# Patient Record
Sex: MALE | Race: BLACK OR AFRICAN AMERICAN | NOT HISPANIC OR LATINO
[De-identification: names, ages, dates, MRNs, and addresses within clinical notes are randomized per-mention and may not be internally consistent; named-entity substitution may affect disease eponyms.]

---

## 2017-04-10 ENCOUNTER — APPOINTMENT (OUTPATIENT)
Dept: INFECTIOUS DISEASE | Facility: CLINIC | Age: 72
End: 2017-04-10

## 2018-02-15 ENCOUNTER — APPOINTMENT (OUTPATIENT)
Dept: FAMILY MEDICINE | Facility: CLINIC | Age: 73
End: 2018-02-15
Payer: SELF-PAY

## 2018-02-15 VITALS
HEIGHT: 71 IN | OXYGEN SATURATION: 96 % | WEIGHT: 244 LBS | SYSTOLIC BLOOD PRESSURE: 138 MMHG | BODY MASS INDEX: 34.16 KG/M2 | DIASTOLIC BLOOD PRESSURE: 60 MMHG | HEART RATE: 51 BPM | TEMPERATURE: 98.2 F

## 2018-02-15 DIAGNOSIS — I25.10 ATHEROSCLEROTIC HEART DISEASE OF NATIVE CORONARY ARTERY W/OUT ANGINA PECTORIS: ICD-10-CM

## 2018-02-15 DIAGNOSIS — I10 ESSENTIAL (PRIMARY) HYPERTENSION: ICD-10-CM

## 2018-02-15 DIAGNOSIS — E11.40 TYPE 2 DIABETES MELLITUS WITH DIABETIC NEUROPATHY, UNSPECIFIED: ICD-10-CM

## 2018-02-15 DIAGNOSIS — E66.9 OBESITY, UNSPECIFIED: ICD-10-CM

## 2018-02-15 DIAGNOSIS — N18.9 CHRONIC KIDNEY DISEASE, UNSPECIFIED: ICD-10-CM

## 2018-02-15 DIAGNOSIS — I73.9 PERIPHERAL VASCULAR DISEASE, UNSPECIFIED: ICD-10-CM

## 2018-02-15 DIAGNOSIS — E11.9 TYPE 2 DIABETES MELLITUS W/OUT COMPLICATIONS: ICD-10-CM

## 2018-02-15 PROCEDURE — 99214 OFFICE O/P EST MOD 30 MIN: CPT

## 2018-02-16 PROBLEM — I73.9 PAD (PERIPHERAL ARTERY DISEASE): Status: ACTIVE | Noted: 2018-02-15

## 2021-11-09 ENCOUNTER — INPATIENT (INPATIENT)
Facility: HOSPITAL | Age: 76
LOS: 2 days | Discharge: HOME | DRG: 638 | End: 2021-11-12
Attending: STUDENT IN AN ORGANIZED HEALTH CARE EDUCATION/TRAINING PROGRAM | Admitting: INTERNAL MEDICINE
Payer: MEDICARE

## 2021-11-09 VITALS
HEART RATE: 75 BPM | DIASTOLIC BLOOD PRESSURE: 59 MMHG | SYSTOLIC BLOOD PRESSURE: 123 MMHG | TEMPERATURE: 97 F | OXYGEN SATURATION: 97 % | RESPIRATION RATE: 18 BRPM | WEIGHT: 220.02 LBS

## 2021-11-09 DIAGNOSIS — Z90.49 ACQUIRED ABSENCE OF OTHER SPECIFIED PARTS OF DIGESTIVE TRACT: Chronic | ICD-10-CM

## 2021-11-09 DIAGNOSIS — Z95.1 PRESENCE OF AORTOCORONARY BYPASS GRAFT: Chronic | ICD-10-CM

## 2021-11-09 DIAGNOSIS — Z98.890 OTHER SPECIFIED POSTPROCEDURAL STATES: Chronic | ICD-10-CM

## 2021-11-09 DIAGNOSIS — I95.9 HYPOTENSION, UNSPECIFIED: ICD-10-CM

## 2021-11-09 LAB
ALBUMIN SERPL ELPH-MCNC: 3.9 G/DL — SIGNIFICANT CHANGE UP (ref 3.3–5)
ALP SERPL-CCNC: 87 U/L — SIGNIFICANT CHANGE UP (ref 40–120)
ALT FLD-CCNC: 42 U/L — SIGNIFICANT CHANGE UP (ref 10–45)
ANION GAP SERPL CALC-SCNC: 9 MMOL/L — SIGNIFICANT CHANGE UP (ref 5–17)
AST SERPL-CCNC: 33 U/L — SIGNIFICANT CHANGE UP (ref 10–40)
BASOPHILS # BLD AUTO: 0.11 K/UL — SIGNIFICANT CHANGE UP (ref 0–0.2)
BASOPHILS NFR BLD AUTO: 1.1 % — SIGNIFICANT CHANGE UP (ref 0–2)
BILIRUB SERPL-MCNC: 0.4 MG/DL — SIGNIFICANT CHANGE UP (ref 0.2–1.2)
BUN SERPL-MCNC: 42 MG/DL — HIGH (ref 7–23)
CALCIUM SERPL-MCNC: 11.1 MG/DL — HIGH (ref 8.4–10.5)
CHLORIDE SERPL-SCNC: 100 MMOL/L — SIGNIFICANT CHANGE UP (ref 96–108)
CO2 SERPL-SCNC: 27 MMOL/L — SIGNIFICANT CHANGE UP (ref 22–31)
CREAT SERPL-MCNC: 2.86 MG/DL — HIGH (ref 0.5–1.3)
EOSINOPHIL # BLD AUTO: 0.26 K/UL — SIGNIFICANT CHANGE UP (ref 0–0.5)
EOSINOPHIL NFR BLD AUTO: 2.5 % — SIGNIFICANT CHANGE UP (ref 0–6)
GLUCOSE BLDC GLUCOMTR-MCNC: 116 MG/DL — HIGH (ref 70–99)
GLUCOSE BLDC GLUCOMTR-MCNC: 123 MG/DL — HIGH (ref 70–99)
GLUCOSE BLDC GLUCOMTR-MCNC: 57 MG/DL — LOW (ref 70–99)
GLUCOSE BLDC GLUCOMTR-MCNC: 63 MG/DL — LOW (ref 70–99)
GLUCOSE BLDC GLUCOMTR-MCNC: 63 MG/DL — LOW (ref 70–99)
GLUCOSE BLDC GLUCOMTR-MCNC: 82 MG/DL — SIGNIFICANT CHANGE UP (ref 70–99)
GLUCOSE SERPL-MCNC: 47 MG/DL — CRITICAL LOW (ref 70–99)
HCT VFR BLD CALC: 41.6 % — SIGNIFICANT CHANGE UP (ref 39–50)
HGB BLD-MCNC: 13.5 G/DL — SIGNIFICANT CHANGE UP (ref 13–17)
IMM GRANULOCYTES NFR BLD AUTO: 0.4 % — SIGNIFICANT CHANGE UP (ref 0–1.5)
LYMPHOCYTES # BLD AUTO: 2.74 K/UL — SIGNIFICANT CHANGE UP (ref 1–3.3)
LYMPHOCYTES # BLD AUTO: 26.5 % — SIGNIFICANT CHANGE UP (ref 13–44)
MCHC RBC-ENTMCNC: 29.4 PG — SIGNIFICANT CHANGE UP (ref 27–34)
MCHC RBC-ENTMCNC: 32.5 GM/DL — SIGNIFICANT CHANGE UP (ref 32–36)
MCV RBC AUTO: 90.6 FL — SIGNIFICANT CHANGE UP (ref 80–100)
MONOCYTES # BLD AUTO: 1.04 K/UL — HIGH (ref 0–0.9)
MONOCYTES NFR BLD AUTO: 10.1 % — SIGNIFICANT CHANGE UP (ref 2–14)
NEUTROPHILS # BLD AUTO: 6.15 K/UL — SIGNIFICANT CHANGE UP (ref 1.8–7.4)
NEUTROPHILS NFR BLD AUTO: 59.4 % — SIGNIFICANT CHANGE UP (ref 43–77)
NRBC # BLD: 0 /100 WBCS — SIGNIFICANT CHANGE UP (ref 0–0)
NT-PROBNP SERPL-SCNC: 203 PG/ML — SIGNIFICANT CHANGE UP (ref 0–300)
PLATELET # BLD AUTO: 255 K/UL — SIGNIFICANT CHANGE UP (ref 150–400)
POTASSIUM SERPL-MCNC: 5.5 MMOL/L — HIGH (ref 3.5–5.3)
POTASSIUM SERPL-SCNC: 5.5 MMOL/L — HIGH (ref 3.5–5.3)
PROT SERPL-MCNC: 8.5 G/DL — HIGH (ref 6–8.3)
RBC # BLD: 4.59 M/UL — SIGNIFICANT CHANGE UP (ref 4.2–5.8)
RBC # FLD: 14 % — SIGNIFICANT CHANGE UP (ref 10.3–14.5)
SARS-COV-2 RNA SPEC QL NAA+PROBE: SIGNIFICANT CHANGE UP
SODIUM SERPL-SCNC: 136 MMOL/L — SIGNIFICANT CHANGE UP (ref 135–145)
TROPONIN I, HIGH SENSITIVITY RESULT: 15.3 NG/L — SIGNIFICANT CHANGE UP
TROPONIN I, HIGH SENSITIVITY RESULT: 15.9 NG/L — SIGNIFICANT CHANGE UP
WBC # BLD: 10.34 K/UL — SIGNIFICANT CHANGE UP (ref 3.8–10.5)
WBC # FLD AUTO: 10.34 K/UL — SIGNIFICANT CHANGE UP (ref 3.8–10.5)

## 2021-11-09 PROCEDURE — 71045 X-RAY EXAM CHEST 1 VIEW: CPT | Mod: 26

## 2021-11-09 PROCEDURE — 99223 1ST HOSP IP/OBS HIGH 75: CPT

## 2021-11-09 PROCEDURE — 99285 EMERGENCY DEPT VISIT HI MDM: CPT

## 2021-11-09 PROCEDURE — 93010 ELECTROCARDIOGRAM REPORT: CPT

## 2021-11-09 RX ORDER — SODIUM CHLORIDE 9 MG/ML
1000 INJECTION, SOLUTION INTRAVENOUS
Refills: 0 | Status: DISCONTINUED | OUTPATIENT
Start: 2021-11-09 | End: 2021-11-12

## 2021-11-09 RX ORDER — TAMSULOSIN HYDROCHLORIDE 0.4 MG/1
0.4 CAPSULE ORAL AT BEDTIME
Refills: 0 | Status: DISCONTINUED | OUTPATIENT
Start: 2021-11-09 | End: 2021-11-12

## 2021-11-09 RX ORDER — LACTOBACILLUS ACIDOPHILUS 100MM CELL
1 CAPSULE ORAL DAILY
Refills: 0 | Status: DISCONTINUED | OUTPATIENT
Start: 2021-11-09 | End: 2021-11-12

## 2021-11-09 RX ORDER — PANTOPRAZOLE SODIUM 20 MG/1
40 TABLET, DELAYED RELEASE ORAL
Refills: 0 | Status: DISCONTINUED | OUTPATIENT
Start: 2021-11-09 | End: 2021-11-12

## 2021-11-09 RX ORDER — CHOLECALCIFEROL (VITAMIN D3) 125 MCG
400 CAPSULE ORAL DAILY
Refills: 0 | Status: DISCONTINUED | OUTPATIENT
Start: 2021-11-09 | End: 2021-11-12

## 2021-11-09 RX ORDER — TIOTROPIUM BROMIDE 18 UG/1
1 CAPSULE ORAL; RESPIRATORY (INHALATION) DAILY
Refills: 0 | Status: DISCONTINUED | OUTPATIENT
Start: 2021-11-09 | End: 2021-11-12

## 2021-11-09 RX ORDER — HEPARIN SODIUM 5000 [USP'U]/ML
5000 INJECTION INTRAVENOUS; SUBCUTANEOUS EVERY 8 HOURS
Refills: 0 | Status: DISCONTINUED | OUTPATIENT
Start: 2021-11-09 | End: 2021-11-12

## 2021-11-09 RX ORDER — SODIUM CHLORIDE 9 MG/ML
500 INJECTION INTRAMUSCULAR; INTRAVENOUS; SUBCUTANEOUS ONCE
Refills: 0 | Status: COMPLETED | OUTPATIENT
Start: 2021-11-09 | End: 2021-11-09

## 2021-11-09 RX ORDER — DEXTROSE 50 % IN WATER 50 %
12.5 SYRINGE (ML) INTRAVENOUS ONCE
Refills: 0 | Status: DISCONTINUED | OUTPATIENT
Start: 2021-11-09 | End: 2021-11-12

## 2021-11-09 RX ORDER — GABAPENTIN 400 MG/1
300 CAPSULE ORAL THREE TIMES A DAY
Refills: 0 | Status: DISCONTINUED | OUTPATIENT
Start: 2021-11-09 | End: 2021-11-12

## 2021-11-09 RX ORDER — PREGABALIN 225 MG/1
1000 CAPSULE ORAL DAILY
Refills: 0 | Status: DISCONTINUED | OUTPATIENT
Start: 2021-11-09 | End: 2021-11-12

## 2021-11-09 RX ORDER — DEXTROSE 50 % IN WATER 50 %
15 SYRINGE (ML) INTRAVENOUS ONCE
Refills: 0 | Status: DISCONTINUED | OUTPATIENT
Start: 2021-11-09 | End: 2021-11-12

## 2021-11-09 RX ORDER — DEXTROSE 50 % IN WATER 50 %
25 SYRINGE (ML) INTRAVENOUS ONCE
Refills: 0 | Status: DISCONTINUED | OUTPATIENT
Start: 2021-11-09 | End: 2021-11-12

## 2021-11-09 RX ORDER — ASPIRIN/CALCIUM CARB/MAGNESIUM 324 MG
81 TABLET ORAL DAILY
Refills: 0 | Status: DISCONTINUED | OUTPATIENT
Start: 2021-11-09 | End: 2021-11-12

## 2021-11-09 RX ORDER — ACETAMINOPHEN 500 MG
650 TABLET ORAL EVERY 6 HOURS
Refills: 0 | Status: DISCONTINUED | OUTPATIENT
Start: 2021-11-09 | End: 2021-11-12

## 2021-11-09 RX ORDER — INSULIN LISPRO 100/ML
VIAL (ML) SUBCUTANEOUS
Refills: 0 | Status: DISCONTINUED | OUTPATIENT
Start: 2021-11-09 | End: 2021-11-12

## 2021-11-09 RX ORDER — ATORVASTATIN CALCIUM 80 MG/1
80 TABLET, FILM COATED ORAL AT BEDTIME
Refills: 0 | Status: DISCONTINUED | OUTPATIENT
Start: 2021-11-09 | End: 2021-11-12

## 2021-11-09 RX ORDER — INSULIN LISPRO 100/ML
VIAL (ML) SUBCUTANEOUS AT BEDTIME
Refills: 0 | Status: DISCONTINUED | OUTPATIENT
Start: 2021-11-09 | End: 2021-11-12

## 2021-11-09 RX ORDER — GLUCAGON INJECTION, SOLUTION 0.5 MG/.1ML
1 INJECTION, SOLUTION SUBCUTANEOUS ONCE
Refills: 0 | Status: DISCONTINUED | OUTPATIENT
Start: 2021-11-09 | End: 2021-11-12

## 2021-11-09 RX ADMIN — SODIUM CHLORIDE 1000 MILLILITER(S): 9 INJECTION INTRAMUSCULAR; INTRAVENOUS; SUBCUTANEOUS at 18:35

## 2021-11-09 RX ADMIN — SODIUM CHLORIDE 500 MILLILITER(S): 9 INJECTION INTRAMUSCULAR; INTRAVENOUS; SUBCUTANEOUS at 19:13

## 2021-11-09 NOTE — ED ADULT NURSE NOTE - CHIEF COMPLAINT QUOTE
Patient presents to ED from home complaining of lightheadedness & dizziness. Patient states he took his BP at home and it was low all day. Patient's BP is normal in triage. Patient takes amlodipine, lisinopril, & furosemide. Patient states he took the correct doses. Patient states he has lost about 10lbs in the last couple of months. ISAR positive.

## 2021-11-09 NOTE — ED PROVIDER NOTE - NSICDXPASTSURGICALHX_GEN_ALL_CORE_FT
PAST SURGICAL HISTORY:  H/O abdominal surgery for 'stomach cancer'    History of appendectomy     History of cholecystectomy     S/P CABG x 1

## 2021-11-09 NOTE — H&P ADULT - ASSESSMENT
76M hx of HTN, HLD, CAD/1vl CABG, CHF, CKD, BPH, PAD, T2DM on insulin, GERD, bladder cancer pw dizziness with hypotension and hypoglycemia    #Hypotension likely sec to overdiuresis.   hold BP meds and diuretics.  Recheck orthostatics, if still orthostatic - will give additional IVFs.     #DOUGLAS on CKD likely sec to above.  hold diuretics, ACE  check PVRs.   renal consult.     #CAD/CHF  restart meds as BP normalize.   Check ECHO.   trend 2nd trop     #T2DM on insulin with hypoglycemia.   hold additional doses of insulin until hypoglycemia resolves.   Restart insulin in am after hypoglycemia resolves.   encouraged po intake.   cardiology consult for meds optimization.    #HLD   cont statin    #DVT/GI proph.  76M hx of HTN, HLD, CAD/1vl CABG, CHF, CKD, BPH, PAD, T2DM on insulin, GERD, bladder cancer pw dizziness with hypotension and hypoglycemia    #Hypotension likely sec to overdiuresis.   hold BP meds and diuretics.  Recheck orthostatics, if still orthostatic - will give additional IVFs.     #DOUGLAS on CKD likely sec to above.  hold diuretics, ACE  check PVRs.   renal consult.     #CAD/CHF  restart meds as BP normalize.   Check ECHO.   trend 2nd trop     #T2DM on insulin with hypoglycemia.   hold additional doses of insulin until hypoglycemia resolves.   Restart insulin in am after hypoglycemia resolves.   encouraged po intake.   check UA to be complete.  cardiology consult for meds optimization.    #HLD   cont statin    #DVT/GI proph.

## 2021-11-09 NOTE — H&P ADULT - NSHPREVIEWOFSYSTEMS_GEN_ALL_CORE
CONSTITUTIONAL: No fever, weight loss, or fatigue  EYES: No eye pain, visual disturbances, or discharge  ENMT:  No difficulty hearing, tinnitus, vertigo; No sinus or throat pain  NECK: No pain or stiffness  RESPIRATORY: No cough, wheezing, chills or hemoptysis; No shortness of breath  CARDIOVASCULAR: No chest pain, palpitations, ++dizziness, no  leg swelling  GASTROINTESTINAL: No abdominal or epigastric pain. No nausea, vomiting, or hematemesis; No diarrhea or constipation. No melena or hematochezia.  GENITOURINARY: No dysuria, frequency, hematuria, or incontinence  NEUROLOGICAL: No headaches, memory loss, loss of strength, numbness, or tremors  SKIN: No itching, burning, rashes, or lesions   LYMPH NODES: No enlarged glands  ENDOCRINE: No heat or cold intolerance; No hair loss  MUSCULOSKELETAL: No joint pain or swelling; No muscle, back, or extremity pain  PSYCHIATRIC: No depression, anxiety, mood swings, or difficulty sleeping  HEME/LYMPH: No easy bruising, or bleeding gums  ALLERY AND IMMUNOLOGIC: No hives or eczema

## 2021-11-09 NOTE — ED ADULT NURSE NOTE - OBJECTIVE STATEMENT
Patient presents to ED from home complaining of lightheadedness & dizziness. Patient states he took his BP at home and it was low all day. Patient's BP is normal in triage. Patient takes amlodipine, lisinopril, & furosemide. Patient states he took the correct doses. Patient states he has lost about 10lbs in the last couple of months.

## 2021-11-09 NOTE — ED PROVIDER NOTE - CLINICAL SUMMARY MEDICAL DECISION MAKING FREE TEXT BOX
Pt c/o lightheadedness and dizziness and low BP at home. As low as 77/40, the patient comes to the ED reporting that he felt he would fall due to low BP. Patient denies chest pain or SOB. No LE edema. Had recent hospitalization at [facility] for CHF exac. His lasix was doubled for 3 days.   Pt took it upon himself to hold his spironolactone today and he also states his metoprolol was D/C. Pt has extremely long list of meds. In ED, Glucose was 64 and on CMP, was 44. Given full meal. Glucose 82.   Will admit. Polypharmacy. Pt needs optimization of meds. D/W Hospitalist. Pt c/o lightheadedness and dizziness and low BP at home. As low as 77/40, the patient comes to the ED reporting that he felt he would fall due to low BP. Patient denies chest pain or SOB. No LE edema. Had recent hospitalization at outside hospital for CHF exac. His lasix was doubled for 3 days.   Pt took it upon himself to hold his spironolactone today and he also states his metoprolol was D/C. Pt has extremely long list of meds. In ED, Glucose was 64 and on CMP, was 44. Given full meal. Glucose 82.   Will admit. Polypharmacy. Pt needs optimization of meds. D/W Hospitalist.

## 2021-11-09 NOTE — H&P ADULT - HISTORY OF PRESENT ILLNESS
76M hx of HTN, HLD, CAD/1vl CABG, CHF, CKD, BPH, PAD, T2DM on insulin, GERD, bladder cancer pw hypotension and hypoglycemia. Pt related he was just discharged about a week ago from outside hospital for CHF exacerbation. His Lasix was doubled to 40mg bid per instruction till  but he actually only decreased it to qd yesterday. Yesterday, he noted sxs of dizziness while walking his dog outside and nearly fainting but no falls or LOC and checked his blood sugar when he went home and noted it to be in the 50s. Today recurrent sxs while he was outside and on return home noted SBP consistently in the 70s and came to ED. He denied any HA, focal weakness, SOB, CP, palps. In ED, initial FS 57 B. WBC: 10.34 BUN/cr: 42/2.86 K: 5.5 Ca:11.1 trop: 15.3 BNP: 203. s/p 500cc NS bolus in ED. In ED noted orthostasis. s/p juice and meal and now FS: 116. He has noted recently with hypoglycemia in the middle of the night and insulin was reduced from 40 U to 25U in the evening but still with recurrent nocturnal hypoglycemia. Was 235 lbs prior to CHF admission and now down to 220. FS was 110 this am and took 60U of usual 70/30 and had a [employer] egg and cheese croissant  and a doughnut as his only meal for today.  Related Metoprolol was recently D/C but unclear why, ?bradycardia

## 2021-11-09 NOTE — ED PROVIDER NOTE - NSICDXFAMILYHX_GEN_ALL_CORE_FT
FAMILY HISTORY:  Mother  Still living? Unknown  FH: type 2 diabetes, Age at diagnosis: Age Unknown    Sibling  Still living? Unknown  FH: type 2 diabetes, Age at diagnosis: Age Unknown  FHx: kidney failure, Age at diagnosis: Age Unknown

## 2021-11-09 NOTE — H&P ADULT - NSHPPHYSICALEXAM_GEN_ALL_CORE
Vital Signs Last 24 Hrs  T(C): 36.1 (09 Nov 2021 16:56), Max: 36.1 (09 Nov 2021 16:56)  T(F): 97 (09 Nov 2021 16:56), Max: 97 (09 Nov 2021 16:56)  HR: 68 (09 Nov 2021 19:48) (68 - 75)  BP: 109/66 (09 Nov 2021 19:48) (109/66 - 123/59)  BP(mean): 80 (09 Nov 2021 19:48) (80 - 80)  RR: 18 (09 Nov 2021 19:48) (18 - 18)  SpO2: 98% (09 Nov 2021 19:48) (97% - 98%)  Daily     Daily   CAPILLARY BLOOD GLUCOSE      POCT Blood Glucose.: 116 mg/dL (09 Nov 2021 21:59)    I&O's Summary      GENERAL: NAD  HEAD:  Normocephalic  EYES: EOMI, PERRLA, conjunctiva and sclera clear  ENMT: No tonsillar erythema, exudates, or enlargement; Moist mucous membranes, No lesions  NECK: Supple, No JVD, no bruit, normal thyroid  NERVOUS SYSTEM:  Alert & Oriented X3, Good concentration; grossly  Motor Strength 5/5 B/L upper and lower extremities; DTRs 2+ intact and symmetric  CHEST/LUNG: Clear to auscultation bilaterally; No rales, rhonchi, wheezing, or rubs  HEART: Regular rate and rhythm; No murmurs, rubs, or gallops  ABDOMEN: Soft, Nontender, Nondistended; Bowel sounds present  EXTREMITIES:  2+ Peripheral Pulses, No clubbing, cyanosis, or edema  LYMPH: No lymphadenopathy noted  SKIN: No rashes or lesions

## 2021-11-09 NOTE — ED ADULT TRIAGE NOTE - CHIEF COMPLAINT QUOTE
Patient presents to ED from home complaining of lightheadedness & dizziness. Patient states he took his BP at home and it was low all day. Patient's BP is normal in triage. Patient takes amlodipine, lisinopril, & furosemide. Patient states he took the correct doses. Patient states he has lost about 10lbs in the last couple of months. Patient presents to ED from home complaining of lightheadedness & dizziness. Patient states he took his BP at home and it was low all day. Patient's BP is normal in triage. Patient takes amlodipine, lisinopril, & furosemide. Patient states he took the correct doses. Patient states he has lost about 10lbs in the last couple of months. ISAR positive.

## 2021-11-09 NOTE — ED PROVIDER NOTE - OBJECTIVE STATEMENT
76M hx of HTN, HLD, CAD/1vl CABG, CHF, CKD, BPH, PAD, T2DM on insulin, GERD, bladder cancer pw hypotension and hypoglycemia. Pt related he was just discharged about a week ago from outside hospital for CHF exacerbation. His Lasix was doubled to 40mg bid per instruction till 11/1 but he actually only decreased it to qd yesterday. Yesterday, he noted sxs of dizziness while walking his dog outside and nearly fainting but no falls or LOC. He checked his blood sugar when he went home and noted it to be in the 50s. Today recurrent sxs while he was outside and on return home noted SBP consistently in the 70s and came to ED. He denied any HA, focal weakness, SOB, CP, palpitations.

## 2021-11-09 NOTE — ED ADULT NURSE REASSESSMENT NOTE - NS ED NURSE REASSESS COMMENT FT1
Pt. finished dinner. BG rechecked. BG reads 82. Will notify provider. Sugar improved from prior check.

## 2021-11-09 NOTE — ED PROVIDER NOTE - NSICDXPASTMEDICALHX_GEN_ALL_CORE_FT
PAST MEDICAL HISTORY:  CAD (coronary artery disease)     Chronic CHF     CKD (chronic kidney disease)     Diabetes mellitus, type 2     HLD (hyperlipidemia)     HTN (hypertension)

## 2021-11-09 NOTE — H&P ADULT - NSHPLABSRESULTS_GEN_ALL_CORE
13.5   10.34 )-----------( 255      ( 09 Nov 2021 18:35 )             41.6       11-09    136  |  100  |  42<H>  ----------------------------<  47<LL>  5.5<H>   |  27  |  2.86<H>    Ca    11.1<H>      09 Nov 2021 18:35    TPro  8.5<H>  /  Alb  3.9  /  TBili  0.4  /  DBili  x   /  AST  33  /  ALT  42  /  AlkPhos  87  11-09         LIVER FUNCTIONS - ( 09 Nov 2021 18:35 )  Alb: 3.9 g/dL / Pro: 8.5 g/dL / ALK PHOS: 87 U/L / ALT: 42 U/L / AST: 33 U/L / GGT: x                       Serum Pro-Brain Natriuretic Peptide: 203 pg/mL (11-09-21 @ 18:35)        CAPILLARY BLOOD GLUCOSE      POCT Blood Glucose.: 116 mg/dL (09 Nov 2021 21:59)  POCT Blood Glucose.: 82 mg/dL (09 Nov 2021 19:14)  POCT Blood Glucose.: 63 mg/dL (09 Nov 2021 18:48)  POCT Blood Glucose.: 63 mg/dL (09 Nov 2021 18:30)  POCT Blood Glucose.: 57 mg/dL (09 Nov 2021 18:29)        EKG: personally rev. NSR at 67bpm, nonspec t wave changes in I, avl      CXR: wet read. personally rev. NAPD. 36

## 2021-11-09 NOTE — ED ADULT NURSE REASSESSMENT NOTE - NS ED NURSE REASSESS COMMENT FT1
pt hypoglycemic MD aware, pt given apple juice and crackers. awaiting meal tray, will recheck bgl in 15 min as per protocol

## 2021-11-10 DIAGNOSIS — E16.2 HYPOGLYCEMIA, UNSPECIFIED: ICD-10-CM

## 2021-11-10 LAB
A1C WITH ESTIMATED AVERAGE GLUCOSE RESULT: 7.6 % — HIGH (ref 4–5.6)
ALBUMIN SERPL ELPH-MCNC: 3.2 G/DL — LOW (ref 3.3–5)
ALP SERPL-CCNC: 74 U/L — SIGNIFICANT CHANGE UP (ref 40–120)
ALT FLD-CCNC: 36 U/L — SIGNIFICANT CHANGE UP (ref 10–45)
ANION GAP SERPL CALC-SCNC: 6 MMOL/L — SIGNIFICANT CHANGE UP (ref 5–17)
APPEARANCE UR: CLEAR — SIGNIFICANT CHANGE UP
AST SERPL-CCNC: 17 U/L — SIGNIFICANT CHANGE UP (ref 10–40)
BACTERIA # UR AUTO: NEGATIVE /HPF — SIGNIFICANT CHANGE UP
BASOPHILS # BLD AUTO: 0.07 K/UL — SIGNIFICANT CHANGE UP (ref 0–0.2)
BASOPHILS NFR BLD AUTO: 1 % — SIGNIFICANT CHANGE UP (ref 0–2)
BILIRUB SERPL-MCNC: 0.2 MG/DL — SIGNIFICANT CHANGE UP (ref 0.2–1.2)
BILIRUB UR-MCNC: NEGATIVE — SIGNIFICANT CHANGE UP
BUN SERPL-MCNC: 41 MG/DL — HIGH (ref 7–23)
CALCIUM SERPL-MCNC: 10.1 MG/DL — SIGNIFICANT CHANGE UP (ref 8.4–10.5)
CHLORIDE SERPL-SCNC: 101 MMOL/L — SIGNIFICANT CHANGE UP (ref 96–108)
CK SERPL-CCNC: 173 U/L — SIGNIFICANT CHANGE UP (ref 30–200)
CO2 SERPL-SCNC: 28 MMOL/L — SIGNIFICANT CHANGE UP (ref 22–31)
COLOR SPEC: YELLOW — SIGNIFICANT CHANGE UP
COVID-19 NUCLEOCAPSID GAM AB INTERP: NEGATIVE — SIGNIFICANT CHANGE UP
COVID-19 NUCLEOCAPSID TOTAL GAM ANTIBODY RESULT: 0.09 INDEX — SIGNIFICANT CHANGE UP
COVID-19 SPIKE DOMAIN AB INTERP: POSITIVE
COVID-19 SPIKE DOMAIN ANTIBODY RESULT: >250 U/ML — HIGH
CREAT SERPL-MCNC: 2.26 MG/DL — HIGH (ref 0.5–1.3)
DIFF PNL FLD: NEGATIVE — SIGNIFICANT CHANGE UP
EOSINOPHIL # BLD AUTO: 0.25 K/UL — SIGNIFICANT CHANGE UP (ref 0–0.5)
EOSINOPHIL NFR BLD AUTO: 3.5 % — SIGNIFICANT CHANGE UP (ref 0–6)
EPI CELLS # UR: SIGNIFICANT CHANGE UP
ESTIMATED AVERAGE GLUCOSE: 171 MG/DL — HIGH (ref 68–114)
GLUCOSE BLDC GLUCOMTR-MCNC: 113 MG/DL — HIGH (ref 70–99)
GLUCOSE BLDC GLUCOMTR-MCNC: 131 MG/DL — HIGH (ref 70–99)
GLUCOSE BLDC GLUCOMTR-MCNC: 148 MG/DL — HIGH (ref 70–99)
GLUCOSE BLDC GLUCOMTR-MCNC: 164 MG/DL — HIGH (ref 70–99)
GLUCOSE BLDC GLUCOMTR-MCNC: 172 MG/DL — HIGH (ref 70–99)
GLUCOSE SERPL-MCNC: 174 MG/DL — HIGH (ref 70–99)
GLUCOSE UR QL: NEGATIVE — SIGNIFICANT CHANGE UP
HCT VFR BLD CALC: 38.6 % — LOW (ref 39–50)
HCV AB S/CO SERPL IA: 0.1 S/CO — SIGNIFICANT CHANGE UP (ref 0–0.99)
HCV AB SERPL-IMP: SIGNIFICANT CHANGE UP
HGB BLD-MCNC: 12.5 G/DL — LOW (ref 13–17)
IMM GRANULOCYTES NFR BLD AUTO: 0.3 % — SIGNIFICANT CHANGE UP (ref 0–1.5)
KETONES UR-MCNC: NEGATIVE — SIGNIFICANT CHANGE UP
LEUKOCYTE ESTERASE UR-ACNC: NEGATIVE — SIGNIFICANT CHANGE UP
LYMPHOCYTES # BLD AUTO: 1.53 K/UL — SIGNIFICANT CHANGE UP (ref 1–3.3)
LYMPHOCYTES # BLD AUTO: 21.7 % — SIGNIFICANT CHANGE UP (ref 13–44)
MAGNESIUM SERPL-MCNC: 1.9 MG/DL — SIGNIFICANT CHANGE UP (ref 1.6–2.6)
MCHC RBC-ENTMCNC: 29.4 PG — SIGNIFICANT CHANGE UP (ref 27–34)
MCHC RBC-ENTMCNC: 32.4 GM/DL — SIGNIFICANT CHANGE UP (ref 32–36)
MCV RBC AUTO: 90.8 FL — SIGNIFICANT CHANGE UP (ref 80–100)
MONOCYTES # BLD AUTO: 0.71 K/UL — SIGNIFICANT CHANGE UP (ref 0–0.9)
MONOCYTES NFR BLD AUTO: 10.1 % — SIGNIFICANT CHANGE UP (ref 2–14)
NEUTROPHILS # BLD AUTO: 4.47 K/UL — SIGNIFICANT CHANGE UP (ref 1.8–7.4)
NEUTROPHILS NFR BLD AUTO: 63.4 % — SIGNIFICANT CHANGE UP (ref 43–77)
NITRITE UR-MCNC: NEGATIVE — SIGNIFICANT CHANGE UP
NRBC # BLD: 0 /100 WBCS — SIGNIFICANT CHANGE UP (ref 0–0)
PH UR: 6 — SIGNIFICANT CHANGE UP (ref 5–8)
PLATELET # BLD AUTO: 212 K/UL — SIGNIFICANT CHANGE UP (ref 150–400)
POTASSIUM SERPL-MCNC: 5.1 MMOL/L — SIGNIFICANT CHANGE UP (ref 3.5–5.3)
POTASSIUM SERPL-SCNC: 5.1 MMOL/L — SIGNIFICANT CHANGE UP (ref 3.5–5.3)
PROT SERPL-MCNC: 7.2 G/DL — SIGNIFICANT CHANGE UP (ref 6–8.3)
PROT UR-MCNC: NEGATIVE — SIGNIFICANT CHANGE UP
RBC # BLD: 4.25 M/UL — SIGNIFICANT CHANGE UP (ref 4.2–5.8)
RBC # FLD: 13.7 % — SIGNIFICANT CHANGE UP (ref 10.3–14.5)
RBC CASTS # UR COMP ASSIST: SIGNIFICANT CHANGE UP /HPF (ref 0–4)
SARS-COV-2 IGG+IGM SERPL QL IA: 0.09 INDEX — SIGNIFICANT CHANGE UP
SARS-COV-2 IGG+IGM SERPL QL IA: >250 U/ML — HIGH
SARS-COV-2 IGG+IGM SERPL QL IA: NEGATIVE — SIGNIFICANT CHANGE UP
SARS-COV-2 IGG+IGM SERPL QL IA: POSITIVE
SODIUM SERPL-SCNC: 135 MMOL/L — SIGNIFICANT CHANGE UP (ref 135–145)
SP GR SPEC: 1.01 — SIGNIFICANT CHANGE UP (ref 1.01–1.02)
UROBILINOGEN FLD QL: NEGATIVE — SIGNIFICANT CHANGE UP
WBC # BLD: 7.05 K/UL — SIGNIFICANT CHANGE UP (ref 3.8–10.5)
WBC # FLD AUTO: 7.05 K/UL — SIGNIFICANT CHANGE UP (ref 3.8–10.5)
WBC UR QL: SIGNIFICANT CHANGE UP /HPF (ref 0–5)

## 2021-11-10 PROCEDURE — 99232 SBSQ HOSP IP/OBS MODERATE 35: CPT

## 2021-11-10 PROCEDURE — 99222 1ST HOSP IP/OBS MODERATE 55: CPT

## 2021-11-10 PROCEDURE — 93306 TTE W/DOPPLER COMPLETE: CPT | Mod: 26

## 2021-11-10 RX ORDER — INSULIN GLARGINE 100 [IU]/ML
10 INJECTION, SOLUTION SUBCUTANEOUS EVERY MORNING
Refills: 0 | Status: DISCONTINUED | OUTPATIENT
Start: 2021-11-10 | End: 2021-11-12

## 2021-11-10 RX ORDER — INSULIN LISPRO 100/ML
5 VIAL (ML) SUBCUTANEOUS
Refills: 0 | Status: DISCONTINUED | OUTPATIENT
Start: 2021-11-10 | End: 2021-11-10

## 2021-11-10 RX ORDER — SODIUM CHLORIDE 9 MG/ML
500 INJECTION INTRAMUSCULAR; INTRAVENOUS; SUBCUTANEOUS ONCE
Refills: 0 | Status: COMPLETED | OUTPATIENT
Start: 2021-11-10 | End: 2021-11-10

## 2021-11-10 RX ADMIN — Medication 5 UNIT(S): at 08:32

## 2021-11-10 RX ADMIN — GABAPENTIN 300 MILLIGRAM(S): 400 CAPSULE ORAL at 21:42

## 2021-11-10 RX ADMIN — Medication 400 UNIT(S): at 12:37

## 2021-11-10 RX ADMIN — TAMSULOSIN HYDROCHLORIDE 0.4 MILLIGRAM(S): 0.4 CAPSULE ORAL at 21:43

## 2021-11-10 RX ADMIN — HEPARIN SODIUM 5000 UNIT(S): 5000 INJECTION INTRAVENOUS; SUBCUTANEOUS at 05:25

## 2021-11-10 RX ADMIN — INSULIN GLARGINE 10 UNIT(S): 100 INJECTION, SOLUTION SUBCUTANEOUS at 08:32

## 2021-11-10 RX ADMIN — PANTOPRAZOLE SODIUM 40 MILLIGRAM(S): 20 TABLET, DELAYED RELEASE ORAL at 05:25

## 2021-11-10 RX ADMIN — SODIUM CHLORIDE 1000 MILLILITER(S): 9 INJECTION INTRAMUSCULAR; INTRAVENOUS; SUBCUTANEOUS at 08:53

## 2021-11-10 RX ADMIN — ATORVASTATIN CALCIUM 80 MILLIGRAM(S): 80 TABLET, FILM COATED ORAL at 21:42

## 2021-11-10 RX ADMIN — Medication 1 TABLET(S): at 12:37

## 2021-11-10 RX ADMIN — Medication 1: at 16:58

## 2021-11-10 RX ADMIN — HEPARIN SODIUM 5000 UNIT(S): 5000 INJECTION INTRAVENOUS; SUBCUTANEOUS at 21:42

## 2021-11-10 RX ADMIN — GABAPENTIN 300 MILLIGRAM(S): 400 CAPSULE ORAL at 15:00

## 2021-11-10 RX ADMIN — GABAPENTIN 300 MILLIGRAM(S): 400 CAPSULE ORAL at 05:27

## 2021-11-10 RX ADMIN — HEPARIN SODIUM 5000 UNIT(S): 5000 INJECTION INTRAVENOUS; SUBCUTANEOUS at 14:59

## 2021-11-10 RX ADMIN — PREGABALIN 1000 MICROGRAM(S): 225 CAPSULE ORAL at 12:37

## 2021-11-10 RX ADMIN — Medication 81 MILLIGRAM(S): at 12:36

## 2021-11-10 RX ADMIN — TIOTROPIUM BROMIDE 1 CAPSULE(S): 18 CAPSULE ORAL; RESPIRATORY (INHALATION) at 09:13

## 2021-11-10 NOTE — CONSULT NOTE ADULT - SUBJECTIVE AND OBJECTIVE BOX
Patient is a 76y old  Male who presents with a chief complaint of dizziness, hypotension, hypoglycemia (10 Nov 2021 13:37)      Reason For Consult:  hypoglycemia     HPI:  76M hx of HTN, HLD, CAD/1vl CABG, CHF, CKD, BPH, PAD, T2DM on insulin, GERD, bladder cancer pw hypotension and hypoglycemia. Pt related he was just discharged about a week ago from outside hospital for CHF exacerbation. His Lasix was doubled to 40mg bid per instruction till  but he actually only decreased it to qd yesterday. Yesterday, he noted sxs of dizziness while walking his dog outside and nearly fainting but no falls or LOC and checked his blood sugar when he went home and noted it to be in the 50s. Today recurrent sxs while he was outside and on return home noted SBP consistently in the 70s and came to ED. He denied any HA, focal weakness, SOB, CP, palps. In ED, initial FS 57 B. WBC: 10.34 BUN/cr: 42/2.86 K: 5.5 Ca:11.1 trop: 15.3 BNP: 203. s/p 500cc NS bolus in ED. In ED noted orthostasis. s/p juice and meal and now FS: 116. He has noted recently with hypoglycemia in the middle of the night and insulin was reduced from 40 U to 25U in the evening but still with recurrent nocturnal hypoglycemia. Was 235 lbs prior to CHF admission and now down to 220. FS was 110 this am and took 60U of usual 70/30 and had a [employer] egg and cheese croissant  and a doughnut as his only meal for today.  Related Metoprolol was recently D/C but unclear why, ?bradycardia (2021 22:17)  Endocrine History : has HbA1C 7.6(7.1 earlier) . CKD 4 with decreased Renal Gluconeogenesis   currently on Lantus 10 units and Lispro sliding scale coverage with meals and finger sticks are in mid 100s   GFR decreased and Creatinine 2.36     PAST MEDICAL & SURGICAL HISTORY:  HTN (hypertension)    HLD (hyperlipidemia)    Diabetes mellitus, type 2    CAD (coronary artery disease)    Chronic CHF    CKD (chronic kidney disease)    S/P CABG x 1    History of appendectomy    History of cholecystectomy    H/O abdominal surgery  for &#x27;stomach cancer&#x27;        FAMILY HISTORY:  FH: type 2 diabetes (Mother, Sibling)    FHx: kidney failure (Sibling)          Social History:    MEDICATIONS  (STANDING):  aspirin enteric coated 81 milliGRAM(s) Oral daily  atorvastatin 80 milliGRAM(s) Oral at bedtime  cholecalciferol 400 Unit(s) Oral daily  cyanocobalamin 1000 MICROGram(s) Oral daily  dextrose 40% Gel 15 Gram(s) Oral once  dextrose 5%. 1000 milliLiter(s) (50 mL/Hr) IV Continuous <Continuous>  dextrose 5%. 1000 milliLiter(s) (100 mL/Hr) IV Continuous <Continuous>  dextrose 50% Injectable 25 Gram(s) IV Push once  dextrose 50% Injectable 12.5 Gram(s) IV Push once  dextrose 50% Injectable 25 Gram(s) IV Push once  gabapentin 300 milliGRAM(s) Oral three times a day  glucagon  Injectable 1 milliGRAM(s) IntraMuscular once  heparin   Injectable 5000 Unit(s) SubCutaneous every 8 hours  insulin glargine Injectable (LANTUS) 10 Unit(s) SubCutaneous every morning  insulin lispro (ADMELOG) corrective regimen sliding scale   SubCutaneous three times a day before meals  insulin lispro (ADMELOG) corrective regimen sliding scale   SubCutaneous at bedtime  lactobacillus acidophilus 1 Tablet(s) Oral daily  pantoprazole    Tablet 40 milliGRAM(s) Oral before breakfast  tamsulosin 0.4 milliGRAM(s) Oral at bedtime  tiotropium 18 MICROgram(s) Capsule 1 Capsule(s) Inhalation daily    MEDICATIONS  (PRN):  acetaminophen     Tablet .. 650 milliGRAM(s) Oral every 6 hours PRN Temp greater or equal to 38C (100.4F), Mild Pain (1 - 3)      REVIEW OF SYSTEMS:  CONSTITUTIONAL:  as per HPI  HEENT:  Eyes:  No diplopia or blurred vision.   ENT:  No earache, sore throat or runny nose.  CARDIOVASCULAR:  No chest pain .  RESPIRATORY:  No cough, shortness of breath, PND or orthopnea.  GASTROINTESTINAL:  No nausea, vomiting or diarrhea.  GENITOURINARY:  No dysuria, frequency or urgency. No Blood in urine  MUSCULOSKELETAL:  no joint aches, no muscle pain, myalgia  SKIN:  No change in skin, hair or nails.  NEUROLOGIC:  No paresthesias, fasciculations, seizures or weakness.  PSYCHIATRIC:  No disorder of thought or mood.  ENDOCRINE:  No heat or cold intolerance, polyuria or polydipsia. abnormal weight gain or loss, oral thrush  HEMATOLOGICAL:  No easy bruising or bleeding.     T(C): 36.4 (11-10-21 @ 21:58), Max: 37.1 (11-10-21 @ 05:26)  HR: 70 (11-10-21 @ 21:58) (63 - 70)  BP: 157/75 (11-10-21 @ 21:58) (111/63 - 157/75)  RR: 18 (11-10-21 @ 21:58) (18 - 21)  SpO2: 100% (11-10-21 @ 21:58) (95% - 100%)  Wt(kg): --    PHYSICAL EXAM:  GENERAL: NAD, well-groomed, well-developed  HEAD:  Atraumatic, Normocephalic  EYES: PERRLA, conjunctiva and sclera clear  ENMT: No  exudates,, Moist mucous membranes,, No lesions  NECK: Supple, No JVD,   NERVOUS SYSTEM:  Alert & Oriented   CHEST/LUNG: Clear to percussion bilaterally; No rales, rhonchi, wheezing, or rubs  HEART: Regular rate and rhythm; No murmurs, rubs, or gallops  ABDOMEN: Soft, Nontender, Nondistended; Bowel sounds present  EXTREMITIES:  2+ Peripheral Pulses, No clubbing, cyanosis, or edema  LYMPH: No lymphadenopathy noted  SKIN: No rashes or lesions    CAPILLARY BLOOD GLUCOSE      POCT Blood Glucose.: 131 mg/dL (10 Nov 2021 21:48)  POCT Blood Glucose.: 164 mg/dL (10 Nov 2021 16:26)  POCT Blood Glucose.: 113 mg/dL (10 Nov 2021 11:44)  POCT Blood Glucose.: 148 mg/dL (10 Nov 2021 07:56)  POCT Blood Glucose.: 172 mg/dL (10 Nov 2021 00:57)                            12.5   7.05  )-----------( 212      ( 10 Nov 2021 07:10 )             38.6       CMP:  11-10 @ 07:10  SGPT 36  Albumin 3.2   Alk Phos 74   Anion Gap 6   SGOT 17   Total Bili 0.2   BUN 41   Calcium Total 10.1   CO2 28   Chloride 101   Creatinine 2.26   eGFR if AA 32   eGFR if non AA 28   Glucose 174   Potassium 5.1   Protein 7.2   Sodium 135      Thyroid Function Tests:      Diabetes Tests:     Parathyroids:     Adrenals:       Radiology:

## 2021-11-10 NOTE — DIETITIAN INITIAL EVALUATION ADULT. - ORAL INTAKE PTA/DIET HISTORY
Pt reports eating "light meals" 2x a day prior to hospital admission. Pt states he trys to avoid sugar and salt at home, uses 2 tablespoons of sugar in coffee. NKFA. Pt takes vitamin D & B12.

## 2021-11-10 NOTE — DIETITIAN INITIAL EVALUATION ADULT. - OTHER INFO
76M hx of HTN, HLD, CAD/1vl CABG, CHF, CKD, BPH, PAD, T2DM on insulin, GERD, bladder cancer pw dizziness with hypotension and hypoglycemia. Was 235 lbs prior to CHF admission and now down to 220. FS was 110 this am and took 60U of usual 70/30 and had a [employer] egg and cheese croissant and a doughnut as his only meal for today.    Pt is tolerating diet with good PO intake, consuming 100% of meals as per nursing flowsheets. No chewing/swallowing difficulties as per patient. Pt denies GI issues. Last BM: Unknown. Pt educated on renal and consistent carbohydrate diet provided with handouts. No edema as per nursing flowsheets. Skin Intact as per nursing flowsheets.

## 2021-11-10 NOTE — CONSULT NOTE ADULT - SUBJECTIVE AND OBJECTIVE BOX
NEPHROLOGY CONSULTATION    CHIEF COMPLAINT: hypotension    HPI:  Pt is 77 yo M w/hx of HTN, HLD, CAD/1v CABG, CHF, CKD, BPH, PAD, T2DM on insulin, GERD, bladder cancer p/w hypotension and hypoglycemia. Pt related he was just discharged about a week ago from outside hospital for CHF exacerbation. His Lasix was doubled to 40mg bid per instruction till  but he actually only decreased it to qd yesterday. Yesterday, he noted sxs of dizziness while walking his dog outside and nearly fainted but no falls or LOC and checked his blood sugar when he went home and noted it to be in the 50s. Today recurrent sxs while he was outside and on return home noted SBP consistently in the 70s and came to ED. He denied any HA, focal weakness, SOB, CP, palps.     ROS:  as above    Allergies:  No Known Allergies    PAST MEDICAL & SURGICAL HISTORY:  HTN (hypertension)  HLD (hyperlipidemia)  Diabetes mellitus, type 2  CAD (coronary artery disease)  Chronic CHF  CKD (chronic kidney disease)  S/P CABG x 1  History of appendectomy  History of cholecystectomy  H/O abdominal surgery  for stomach cancer    SOCIAL HISTORY:  negative    FAMILY HISTORY:  FH: type 2 diabetes (Mother, Sibling)  FHx: kidney failure (Sibling)    MEDICATIONS  (STANDING):  aspirin enteric coated 81 milliGRAM(s) Oral daily  atorvastatin 80 milliGRAM(s) Oral at bedtime  cholecalciferol 400 Unit(s) Oral daily  cyanocobalamin 1000 MICROGram(s) Oral daily  dextrose 40% Gel 15 Gram(s) Oral once  dextrose 5%. 1000 milliLiter(s) (50 mL/Hr) IV Continuous <Continuous>  dextrose 5%. 1000 milliLiter(s) (100 mL/Hr) IV Continuous <Continuous>  dextrose 50% Injectable 25 Gram(s) IV Push once  dextrose 50% Injectable 12.5 Gram(s) IV Push once  dextrose 50% Injectable 25 Gram(s) IV Push once  gabapentin 300 milliGRAM(s) Oral three times a day  glucagon  Injectable 1 milliGRAM(s) IntraMuscular once  heparin   Injectable 5000 Unit(s) SubCutaneous every 8 hours  insulin glargine Injectable (LANTUS) 10 Unit(s) SubCutaneous every morning  insulin lispro (ADMELOG) corrective regimen sliding scale   SubCutaneous three times a day before meals  insulin lispro (ADMELOG) corrective regimen sliding scale   SubCutaneous at bedtime  lactobacillus acidophilus 1 Tablet(s) Oral daily  pantoprazole    Tablet 40 milliGRAM(s) Oral before breakfast  tamsulosin 0.4 milliGRAM(s) Oral at bedtime  tiotropium 18 MICROgram(s) Capsule 1 Capsule(s) Inhalation daily    Home Medications:  amLODIPine 10 mg oral tablet: 1 tab(s) orally once a day (2021 21:57)  aspirin 81 mg oral tablet: 1 tab(s) orally once a day (2021 21:58)  Bacid (LAC) oral capsule: 1 cap(s) orally once a day (2021 22:00)  cyanocobalamin 1000 mcg oral tablet: 1 tab(s) orally once a day (:59)  Flomax 0.4 mg oral capsule: 1 cap(s) orally once a day (2021 22:01)  gabapentin 300 mg oral capsule: 2 cap(s) orally 3 times a day (2021 21:59)  HumuLIN 70/30 subcutaneous suspension: 60 U qam and 25 u qpm (2021 22:00)  Lasix 40 mg oral tablet: 1 tab(s) orally once a day (:58)  Lipitor 80 mg oral tablet: 1 tab(s) orally once a day (:58)  lisinopril 40 mg oral tablet: 1 tab(s) orally once a day (2021 22:00)  metFORMIN 1000 mg oral tablet: 1 tab(s) orally 2 times a day (2021 22:00)  sildenafil 100 mg oral tablet:  (2021 22:00)  Spiriva 18 mcg inhalation capsule: 1 cap(s) inhaled once a day (2021 22:01)  spironolactone 25 mg oral tablet: 1 tab(s) orally once a day (2021 22:01)  Vitamin D3 400 intl units (10 mcg) oral tablet: 1 tab(s) orally once a day (:58)  Zetia 10 mg oral tablet: 1 tab(s) orally once a day (2021 21:59)    Vital Signs Last 24 Hrs  T(C): 36.7 (11-10-21 @ 08:02), Max: 37.1 (11-10-21 @ 05:26)  T(F): 98 (11-10-21 @ 08:02), Max: 98.7 (11-10-21 @ 05:26)  HR: 63 (11-10-21 @ 09:16) (63 - 75)  BP: 111/63 (11-10-21 @ 10:31) (109/66 - 146/67)  BP(mean): 80 (21 @ 19:48) (80 - 80)  RR: 18 (11-10-21 @ 08:02) (17 - 21)  SpO2: 97% (11-10-21 @ 09:16) (95% - 98%)    s1s2  b/l air entry  soft  no edema    LABS:                        12.5   7.05  )-----------( 212      ( 10 Nov 2021 07:10 )             38.6     11-10    135  |  101  |  41<H>  ----------------------------<  174<H>  5.1   |  28  |  2.26<H>    Ca    10.1      10 Nov 2021 07:10  Mg     1.9     11-10    TPro  7.2  /  Alb  3.2<L>  /  TBili  0.2  /  DBili  x   /  AST  17  /  ALT  36  /  AlkPhos  74  11-10    Urinalysis Basic - ( 10 Nov 2021 02:56 )    Color: Yellow / Appearance: Clear / S.015 / pH: x  Gluc: x / Ketone: Negative  / Bili: Negative / Urobili: Negative   Blood: x / Protein: Negative / Nitrite: Negative   Leuk Esterase: Negative / RBC: 0-4 /HPF / WBC 0-2 /HPF   Sq Epi: x / Non Sq Epi: Neg.-Few / Bacteria: Negative /HPF    LIVER FUNCTIONS - ( 10 Nov 2021 07:10 )  Alb: 3.2 g/dL / Pro: 7.2 g/dL / ALK PHOS: 74 U/L / ALT: 36 U/L / AST: 17 U/L / GGT: x           A/P:             NEPHROLOGY CONSULTATION    CHIEF COMPLAINT: hypotension    HPI:  Pt is 77 yo M w/hx of HTN, HLD, CAD/1v CABG, CHF, CKD, BPH, PAD, T2DM on insulin, GERD, bladder cancer p/w hypotension and hypoglycemia. Pt related he was just discharged about a week ago from outside hospital for CHF exacerbation. His Lasix was doubled to 40mg bid per instruction till  but he actually only decreased it to qd yesterday. Yesterday, he noted sxs of dizziness while walking his dog outside and nearly fainted but no falls or LOC and checked his blood sugar when he went home and noted it to be in the 50s. Today recurrent sxs while he was outside and on return home noted SBP consistently in the 70s and came to ED. He denied any HA, focal weakness, SOB, CP, palps. No N/V/D/C/F/C. Was on Lasix, ACE, Metformin, Aldactone as op.     ROS:  as above    Allergies:  No Known Allergies    PAST MEDICAL & SURGICAL HISTORY:  HTN (hypertension)  HLD (hyperlipidemia)  Diabetes mellitus, type 2  CAD (coronary artery disease)  Chronic CHF  CKD (chronic kidney disease)  S/P CABG x 1  History of appendectomy  History of cholecystectomy  H/O abdominal surgery  for stomach cancer    SOCIAL HISTORY:  negative    FAMILY HISTORY:  FH: type 2 diabetes (Mother, Sibling)  FHx: kidney failure (Sibling)    MEDICATIONS  (STANDING):  aspirin enteric coated 81 milliGRAM(s) Oral daily  atorvastatin 80 milliGRAM(s) Oral at bedtime  cholecalciferol 400 Unit(s) Oral daily  cyanocobalamin 1000 MICROGram(s) Oral daily  dextrose 40% Gel 15 Gram(s) Oral once  dextrose 5%. 1000 milliLiter(s) (50 mL/Hr) IV Continuous <Continuous>  dextrose 5%. 1000 milliLiter(s) (100 mL/Hr) IV Continuous <Continuous>  dextrose 50% Injectable 25 Gram(s) IV Push once  dextrose 50% Injectable 12.5 Gram(s) IV Push once  dextrose 50% Injectable 25 Gram(s) IV Push once  gabapentin 300 milliGRAM(s) Oral three times a day  glucagon  Injectable 1 milliGRAM(s) IntraMuscular once  heparin   Injectable 5000 Unit(s) SubCutaneous every 8 hours  insulin glargine Injectable (LANTUS) 10 Unit(s) SubCutaneous every morning  insulin lispro (ADMELOG) corrective regimen sliding scale   SubCutaneous three times a day before meals  insulin lispro (ADMELOG) corrective regimen sliding scale   SubCutaneous at bedtime  lactobacillus acidophilus 1 Tablet(s) Oral daily  pantoprazole    Tablet 40 milliGRAM(s) Oral before breakfast  tamsulosin 0.4 milliGRAM(s) Oral at bedtime  tiotropium 18 MICROgram(s) Capsule 1 Capsule(s) Inhalation daily    Home Medications:  amLODIPine 10 mg oral tablet: 1 tab(s) orally once a day (2021 21:57)  aspirin 81 mg oral tablet: 1 tab(s) orally once a day (2021 21:58)  Bacid (LAC) oral capsule: 1 cap(s) orally once a day (2021 22:00)  cyanocobalamin 1000 mcg oral tablet: 1 tab(s) orally once a day (:59)  Flomax 0.4 mg oral capsule: 1 cap(s) orally once a day (2021 22:01)  gabapentin 300 mg oral capsule: 2 cap(s) orally 3 times a day (:59)  HumuLIN 70/30 subcutaneous suspension: 60 U qam and 25 u qpm (2021 22:00)  Lasix 40 mg oral tablet: 1 tab(s) orally once a day (:58)  Lipitor 80 mg oral tablet: 1 tab(s) orally once a day (:58)  lisinopril 40 mg oral tablet: 1 tab(s) orally once a day (2021 22:00)  metFORMIN 1000 mg oral tablet: 1 tab(s) orally 2 times a day (2021 22:00)  sildenafil 100 mg oral tablet:  (2021 22:00)  Spiriva 18 mcg inhalation capsule: 1 cap(s) inhaled once a day (2021 22:01)  spironolactone 25 mg oral tablet: 1 tab(s) orally once a day (2021 22:01)  Vitamin D3 400 intl units (10 mcg) oral tablet: 1 tab(s) orally once a day (2021 21:58)  Zetia 10 mg oral tablet: 1 tab(s) orally once a day (2021 21:59)    Vital Signs Last 24 Hrs  T(C): 36.7 (11-10-21 @ 08:02), Max: 37.1 (11-10-21 @ 05:26)  T(F): 98 (11-10-21 @ 08:02), Max: 98.7 (11-10-21 @ 05:26)  HR: 63 (11-10-21 @ 09:16) (63 - 75)  BP: 111/63 (11-10-21 @ 10:31) (109/66 - 146/67)  BP(mean): 80 (21 @ 19:48) (80 - 80)  RR: 18 (11-10-21 @ 08:02) (17 - 21)  SpO2: 97% (11-10-21 @ 09:16) (95% - 98%)    s1s2  b/l air entry  soft  no edema    LABS:                        12.5   7.05  )-----------( 212      ( 10 Nov 2021 07:10 )             38.6     11-10    135  |  101  |  41<H>  ----------------------------<  174<H>  5.1   |  28  |  2.26<H>    Ca    10.1      10 Nov 2021 07:10  Mg     1.9     -10    TPro  7.2  /  Alb  3.2<L>  /  TBili  0.2  /  DBili  x   /  AST  17  /  ALT  36  /  AlkPhos  74  11-10    Urinalysis Basic - ( 10 Nov 2021 02:56 )    Color: Yellow / Appearance: Clear / S.015 / pH: x  Gluc: x / Ketone: Negative  / Bili: Negative / Urobili: Negative   Blood: x / Protein: Negative / Nitrite: Negative   Leuk Esterase: Negative / RBC: 0-4 /HPF / WBC 0-2 /HPF   Sq Epi: x / Non Sq Epi: Neg.-Few / Bacteria: Negative /HPF    LIVER FUNCTIONS - ( 10 Nov 2021 07:10 )  Alb: 3.2 g/dL / Pro: 7.2 g/dL / ALK PHOS: 74 U/L / ALT: 36 U/L / AST: 17 U/L / GGT: x           A/P:    Hemodynamic DOUGLAS, although baseline renal fx is unknown  Improving  Avoid ACE/ARB, Metformin  Avoid Aldactone for now  Lasix on hold  UA negative  BMP in am  Will f/u renal SONO  Further recommendations pending clinical course  Will follow    [phone]

## 2021-11-10 NOTE — DIETITIAN INITIAL EVALUATION ADULT. - PERTINENT LABORATORY DATA
Labs   Na- 135 (WNL)  K- 5.1 (WNL)  BUN- 41 (H)  Creat- 2.26 (H)  Glucose- 1674 (H)  A1c- 7.6 (H)  POCT- 113 (H)

## 2021-11-10 NOTE — DIETITIAN INITIAL EVALUATION ADULT. - PERTINENT MEDS FT
MEDICATIONS  (STANDING):  aspirin enteric coated 81 milliGRAM(s) Oral daily  atorvastatin 80 milliGRAM(s) Oral at bedtime  cholecalciferol 400 Unit(s) Oral daily  cyanocobalamin 1000 MICROGram(s) Oral daily  dextrose 40% Gel 15 Gram(s) Oral once  dextrose 5%. 1000 milliLiter(s) (50 mL/Hr) IV Continuous <Continuous>  dextrose 5%. 1000 milliLiter(s) (100 mL/Hr) IV Continuous <Continuous>  dextrose 50% Injectable 25 Gram(s) IV Push once  dextrose 50% Injectable 12.5 Gram(s) IV Push once  dextrose 50% Injectable 25 Gram(s) IV Push once  gabapentin 300 milliGRAM(s) Oral three times a day  glucagon  Injectable 1 milliGRAM(s) IntraMuscular once  heparin   Injectable 5000 Unit(s) SubCutaneous every 8 hours  insulin glargine Injectable (LANTUS) 10 Unit(s) SubCutaneous every morning  insulin lispro (ADMELOG) corrective regimen sliding scale   SubCutaneous three times a day before meals  insulin lispro (ADMELOG) corrective regimen sliding scale   SubCutaneous at bedtime  lactobacillus acidophilus 1 Tablet(s) Oral daily  pantoprazole    Tablet 40 milliGRAM(s) Oral before breakfast  tamsulosin 0.4 milliGRAM(s) Oral at bedtime  tiotropium 18 MICROgram(s) Capsule 1 Capsule(s) Inhalation daily    MEDICATIONS  (PRN):  acetaminophen     Tablet .. 650 milliGRAM(s) Oral every 6 hours PRN Temp greater or equal to 38C (100.4F), Mild Pain (1 - 3)

## 2021-11-10 NOTE — CONSULT NOTE ADULT - SUBJECTIVE AND OBJECTIVE BOX
JAYA BENSON  [id number]      HPI:    Jaya Benson is a 76 year old man, follows with [facility] with past medical history of Coronary artery disease (s/p CABG), Peripheral arterial disease, Hypertension, Hyperlipidemia, Type II Diabetes mellitus and CKD who was recently discharged for CHF exacerbation at outside hospital, now presents with hypotension.    The patient reports that since his discharge he was taking Lasix 80 mg daily with an additional diuretic, and was not sure what he was supposed to take. States that yesterday he felt dizzy and presyncope but did not have syncope, he checked his BP and SBP was in 70s. Denies any recent angina. Reports having abnormal CT chest with plans for coronary angiogram (not scheduled yet).      ALLERGIES:  No Known Allergies      PAST MEDICAL & SURGICAL HISTORY:  Coronary artery disease (s/p CABG)  Peripheral arterial disease  Hypertension  Hyperlipidemia  Type II Diabetes mellitus  CKD  History of appendectomy  History of cholecystectomy  H/O abdominal surgery      CURRENT MEDICATIONS:  acetaminophen     Tablet .. 650 milliGRAM(s) Oral every 6 hours PRN  aspirin enteric coated 81 milliGRAM(s) Oral daily  atorvastatin 80 milliGRAM(s) Oral at bedtime  cholecalciferol 400 Unit(s) Oral daily  cyanocobalamin 1000 MICROGram(s) Oral daily  dextrose 40% Gel 15 Gram(s) Oral once  dextrose 5%. 1000 milliLiter(s) IV Continuous <Continuous>  dextrose 5%. 1000 milliLiter(s) IV Continuous <Continuous>  dextrose 50% Injectable 25 Gram(s) IV Push once  dextrose 50% Injectable 12.5 Gram(s) IV Push once  dextrose 50% Injectable 25 Gram(s) IV Push once  gabapentin 300 milliGRAM(s) Oral three times a day  glucagon  Injectable 1 milliGRAM(s) IntraMuscular once  heparin   Injectable 5000 Unit(s) SubCutaneous every 8 hours  insulin glargine Injectable (LANTUS) 10 Unit(s) SubCutaneous every morning  insulin lispro (ADMELOG) corrective regimen sliding scale   SubCutaneous three times a day before meals  insulin lispro (ADMELOG) corrective regimen sliding scale   SubCutaneous at bedtime  lactobacillus acidophilus 1 Tablet(s) Oral daily  pantoprazole    Tablet 40 milliGRAM(s) Oral before breakfast  tamsulosin 0.4 milliGRAM(s) Oral at bedtime  tiotropium 18 MICROgram(s) Capsule 1 Capsule(s) Inhalation daily        FAMILY HISTORY:  FH: type 2 diabetes (Mother, Sibling)  FHx: kidney failure (Sibling)        ROS:  All 10 systems reviewed and positives noted in HPI    OBJECTIVE:    VITAL SIGNS:  Vital Signs Last 24 Hrs  T(C): 36.7 (10 Nov 2021 08:02), Max: 37.1 (10 Nov 2021 05:26)  T(F): 98 (10 Nov 2021 08:02), Max: 98.7 (10 Nov 2021 05:26)  HR: 63 (10 Nov 2021 09:16) (63 - 75)  BP: 111/63 (10 Nov 2021 10:31) (109/66 - 146/67)  BP(mean): 80 (09 Nov 2021 19:48) (80 - 80)  RR: 18 (10 Nov 2021 08:02) (17 - 21)  SpO2: 97% (10 Nov 2021 09:16) (95% - 98%)    PHYSICAL EXAM:  General: elderly man, obese, no distress  HEENT: sclera anicteric  Neck: supple, no carotid bruits b/l  CVS: JVP ~ 7 cm H20, RRR, s1, s2, no murmurs/rubs  Chest: unlabored respirations, clear to auscultation b/l  Abdomen: non-distended  Extremities: no lower extremity edema b/l  Neuro: awake, alert & oriented x 3  Psych: normal affect      LABS:                        12.5   7.05  )-----------( 212      ( 10 Nov 2021 07:10 )             38.6     11-10    135  |  101  |  41<H>  ----------------------------<  174<H>  5.1   |  28  |  2.26<H>    Ca    10.1      10 Nov 2021 07:10  Mg     1.9     11-10    TPro  7.2  /  Alb  3.2<L>  /  TBili  0.2  /  DBili  x   /  AST  17  /  ALT  36  /  AlkPhos  74  11-10    CARDIAC MARKERS ( 10 Nov 2021 07:10 )  x     / x     / 173 U/L / x     / x            ECG (11/9/21): sinus rhythm, nonspecific       TTE:     JAYA BENSON  [id number]      HPI:    Jaya Benson is a 76 year old man, follows with [facility] with past medical history of Coronary artery disease (s/p CABG), Peripheral arterial disease, Hypertension, Hyperlipidemia, Type II Diabetes mellitus and CKD who was recently discharged for CHF exacerbation at outside hospital, now presents with hypotension.    The patient reports that since his discharge he was taking Lasix 80 mg daily with an additional diuretic, and was not sure what he was supposed to take. States that yesterday he felt dizzy and presyncope but did not have syncope, he checked his BP and SBP was in 70s. Denies any recent angina. Reports having abnormal CT chest with plans for coronary angiogram (not scheduled yet).      ALLERGIES:  No Known Allergies      PAST MEDICAL & SURGICAL HISTORY:  Coronary artery disease (s/p CABG)  Peripheral arterial disease  Hypertension  Hyperlipidemia  Type II Diabetes mellitus  CKD  History of appendectomy  History of cholecystectomy  H/O abdominal surgery      CURRENT MEDICATIONS:  acetaminophen     Tablet .. 650 milliGRAM(s) Oral every 6 hours PRN  aspirin enteric coated 81 milliGRAM(s) Oral daily  atorvastatin 80 milliGRAM(s) Oral at bedtime  cholecalciferol 400 Unit(s) Oral daily  cyanocobalamin 1000 MICROGram(s) Oral daily  dextrose 40% Gel 15 Gram(s) Oral once  dextrose 5%. 1000 milliLiter(s) IV Continuous <Continuous>  dextrose 5%. 1000 milliLiter(s) IV Continuous <Continuous>  dextrose 50% Injectable 25 Gram(s) IV Push once  dextrose 50% Injectable 12.5 Gram(s) IV Push once  dextrose 50% Injectable 25 Gram(s) IV Push once  gabapentin 300 milliGRAM(s) Oral three times a day  glucagon  Injectable 1 milliGRAM(s) IntraMuscular once  heparin   Injectable 5000 Unit(s) SubCutaneous every 8 hours  insulin glargine Injectable (LANTUS) 10 Unit(s) SubCutaneous every morning  insulin lispro (ADMELOG) corrective regimen sliding scale   SubCutaneous three times a day before meals  insulin lispro (ADMELOG) corrective regimen sliding scale   SubCutaneous at bedtime  lactobacillus acidophilus 1 Tablet(s) Oral daily  pantoprazole    Tablet 40 milliGRAM(s) Oral before breakfast  tamsulosin 0.4 milliGRAM(s) Oral at bedtime  tiotropium 18 MICROgram(s) Capsule 1 Capsule(s) Inhalation daily        FAMILY HISTORY:  FH: type 2 diabetes (Mother, Sibling)  FHx: kidney failure (Sibling)        ROS:  All 10 systems reviewed and positives noted in HPI    OBJECTIVE:    VITAL SIGNS:  Vital Signs Last 24 Hrs  T(C): 36.7 (10 Nov 2021 08:02), Max: 37.1 (10 Nov 2021 05:26)  T(F): 98 (10 Nov 2021 08:02), Max: 98.7 (10 Nov 2021 05:26)  HR: 63 (10 Nov 2021 09:16) (63 - 75)  BP: 111/63 (10 Nov 2021 10:31) (109/66 - 146/67)  BP(mean): 80 (09 Nov 2021 19:48) (80 - 80)  RR: 18 (10 Nov 2021 08:02) (17 - 21)  SpO2: 97% (10 Nov 2021 09:16) (95% - 98%)    PHYSICAL EXAM:  General: elderly man, obese, no distress  HEENT: sclera anicteric  Neck: supple, no carotid bruits b/l  CVS: JVP ~ 7 cm H20, RRR, s1, s2, no murmurs/rubs  Chest: unlabored respirations, clear to auscultation b/l  Abdomen: non-distended  Extremities: no lower extremity edema b/l  Neuro: awake, alert & oriented x 3  Psych: normal affect      LABS:                        12.5   7.05  )-----------( 212      ( 10 Nov 2021 07:10 )             38.6     11-10    135  |  101  |  41<H>  ----------------------------<  174<H>  5.1   |  28  |  2.26<H>    Ca    10.1      10 Nov 2021 07:10  Mg     1.9     11-10    TPro  7.2  /  Alb  3.2<L>  /  TBili  0.2  /  DBili  x   /  AST  17  /  ALT  36  /  AlkPhos  74  11-10    CARDIAC MARKERS ( 10 Nov 2021 07:10 )  x     / x     / 173 U/L / x     / x            ECG (11/9/21): sinus rhythm, nonspecific     No prior cardiac workup available

## 2021-11-10 NOTE — CONSULT NOTE ADULT - PROBLEM SELECTOR RECOMMENDATION 9
om Metformin 1 gm BID and 70/30 mix ,  80-85 units in divided doses   currently with decreased Renal Gluconeogenesis secondary to Chronic Renal Insufficiency and may need less insulin    will not require much insulin/ diabetes medications upon discharge  can be discharged on current dose/ regimen   short frequent meals and low glycemic index carbohydrates will help stabilize blood glucose and eliminate fluctuations and avoid hypoglycemia   Thank You for the courtesy of this consultation !!!

## 2021-11-10 NOTE — PROGRESS NOTE ADULT - ASSESSMENT
76M hx of HTN, HLD, CAD/1vl CABG, CHF, CKD, BPH, PAD, T2DM on insulin, GERD, bladder cancer pw dizziness with hypotension and hypoglycemia    #Hypotension likely sec to overdiuresis.   - Hold BP meds and diuretics (home meds: amlodipine 10mg, lasix 40mg, lisinopril 40mg, spironolactone 25mg)  - Received 1 liter NS in ED  - Check orthostatics, if still orthostatic - will give additional IVFs.     #DOUGLAS on CKD likely sec to above.  - Holding diuretics, ACE  - PVRs low  - Renal consult pending  - f/u AM labs     #CAD/CHF  - Restart meds as BP normalize.   - Check ECHO.   - Trops negative x2  - Cardiology consult pending    #T2DM on insulin with hypoglycemia  - Home insulin: humulin 70/30, 60 units in AM, 25 units in PM  - Hypoglycemia resolved  - Continue lantus 10 units q morning and admelog 5 units TID with meals, accucheck and sliding scale insulin for now  - Encourage po intake  - Endocrinology consult for meds optimization.    #HLD   - Cont statin    #BPH  - Continue flomax     #DVT/GI proph  - Heparin   - PPI     76M hx of HTN, HLD, CAD/1vl CABG, CHF, CKD, BPH, PAD, T2DM on insulin, GERD, bladder cancer pw dizziness with hypotension and hypoglycemia    #Hypotension likely sec to overdiuresis.   - Hold BP meds and diuretics (home meds: amlodipine 10mg, lasix 40mg, lisinopril 40mg, spironolactone 25mg)  - Received 500ml  NS in ED  - Check orthostatics, if still orthostatic - will give additional IVFs.     #DOUGLAS on CKD likely sec to above.  - Holding diuretics, ACE  - PVRs low  - Renal consult pending  - f/u AM labs     #CAD/CHF  - Restart meds as BP normalize.   - Check ECHO.   - Trops negative x2  - Cardiology consult pending    #T2DM on insulin with hypoglycemia  - Home insulin: humulin 70/30, 60 units in AM, 25 units in PM  - Hypoglycemia resolved  - Continue lantus 10 units q morning and admelog 5 units TID with meals, accucheck and sliding scale insulin for now  - Encourage po intake  - Endocrinology consult for meds optimization.    #HLD   - Cont statin    #BPH  - Continue flomax     #DVT/GI proph  - Heparin   - PPI     76M hx of HTN, HLD, CAD/1vl CABG, CHF, CKD, BPH, PAD, T2DM on insulin, GERD, bladder cancer pw dizziness with hypotension and hypoglycemia    #Hypotension likely sec to overdiuresis.   - Hold BP meds and diuretics (home meds: amlodipine 10mg, lasix 40mg, lisinopril 40mg, spironolactone 25mg)  - Received 500ml  NS in ED  - Patient still orthostatic this morning, will give another 500ml bolus    #DOUGLAS on CKD likely sec to above.  - Holding diuretics, ACE  - PVRs low  - Renal consult pending  - Cr slightly improved from 2.86 --> 2.26. Patient is unsure of baseline but believes it is ~1.4    #CAD/CHF  - Restart meds as BP normalize.   - Check ECHO.   - Trops negative x2  - Cardiology consult pending  - Of note, patient had a CT coronary outpatient earlier this week and he reports he may need a stent     #T2DM on insulin with hypoglycemia  - Home insulin: humulin 70/30, 60 units in AM, 25 units in PM  - Hypoglycemia resolved  - Continue lantus 10 units q morning, accucheck and sliding scale insulin for now  - Encourage po intake  - Endocrinology consult for meds optimization.  - Patient reports recent diet changes, he has been self-adjusting his insulin dosing based on his blood glucose levels at home     #HLD   - Cont statin    #BPH  - Continue flomax     #DVT/GI proph  - Heparin   - PPI    Patient states he will update his family  76M hx of HTN, HLD, CAD/1vl CABG, CHF, CKD, BPH, PAD, T2DM on insulin, GERD, bladder cancer pw dizziness with hypotension and hypoglycemia    #Hypotension likely sec to overdiuresis.   - Hold BP meds and diuretics (home meds: amlodipine 10mg, lasix 40mg, lisinopril 40mg, spironolactone 25mg)  - Received 500ml  NS in ED  - Patient still orthostatic this morning, will give another 500ml bolus    #DOUGLAS on CKD likely sec to above.  - Holding diuretics, ACE, spironolactone   - PVRs low  - Renal consult pending  - Cr slightly improved from 2.86 --> 2.26. Patient is unsure of baseline but believes it is ~1.4    #CAD/CHF  - Restart meds as BP normalize.   - Check ECHO.   - Trops negative x2  - Cardiology consult pending  - Of note, patient had a CT coronary outpatient earlier this week and he reports he may need a stent     #T2DM on insulin with hypoglycemia  - Home insulin: humulin 70/30, 60 units in AM, 25 units in PM  - Hypoglycemia resolved  - Continue lantus 10 units q morning, accucheck and sliding scale insulin for now  - Encourage po intake  - Endocrinology consult for meds optimization.  - Patient reports recent diet changes, he has been self-adjusting his insulin dosing based on his blood glucose levels at home     #HLD   - Cont statin    #BPH  - Continue flomax     #DVT/GI proph  - Heparin   - PPI    Patient states he will update his family  76M hx of HTN, HLD, CAD/1vl CABG, CHF, CKD, BPH, PAD, T2DM on insulin, GERD, bladder cancer pw dizziness with hypotension and hypoglycemia    #Hypotension likely sec to overdiuresis.   - Hold BP meds and diuretics (home meds: amlodipine 10mg, lasix 40mg, lisinopril 40mg, spironolactone 25mg)  - Received 500ml  NS in ED  - Patient still orthostatic this morning, will give another 500ml bolus    #DOUGLAS on CKD likely sec to above.  - Holding diuretics, ACE, spironolactone   - PVRs low  - Renal consult pending  - Cr slightly improved from 2.86 --> 2.26. Patient is unsure of baseline but believes it is ~1.4    #CAD/CHF  - Restart meds as BP normalize.   - Check ECHO.   - Trops negative x2  - Cardiology consult pending  - Of note, patient had a CT coronary outpatient earlier this week and he reports he may need a stent     #T2DM on insulin with hypoglycemia  - Home insulin: humulin 70/30, 60 units in AM, 25 units in PM  - Hypoglycemia resolved  - Continue lantus 10 units q morning, accucheck and sliding scale insulin for now  - Encourage po intake  - Endocrinology consulted.  - Patient reports recent diet changes, he has been self-adjusting his insulin dosing based on his blood glucose levels at home     #HLD   - Cont statin    #BPH  - Continue flomax     #DVT/GI proph  - Heparin   - PPI    Patient states he will update his family

## 2021-11-10 NOTE — CONSULT NOTE ADULT - ASSESSMENT
diabetes / hypoglycemia 
Assessment:  Franky Benson is a 76 year old man, follows with [facility] Cardiology with past medical history of Coronary artery disease (s/p CABG), Peripheral arterial disease, Hypertension, Hyperlipidemia, Type II Diabetes mellitus and CKD who was recently discharged for CHF exacerbation at outside hospital, now presents with hypotension, likely due to hypovolemia from over-diuresis.    ECG consistent with sinus rhythm, no acute ischemic ST abnormalities, troponins negative x 2, no signs of acute coronary syndrome.     Recommendations:  [] CHF: Will follow up echo to evaluate LVEF. S/p IV lasix boluses, would avoid further IV fluids at this time and encourage PO intake. Plan to start Lasix 40 mg PO daily tomorrow. Monitor urine output. Plan to resume home HF meds if MAP > 65   [] Coronary artery disease s/p CABG: Appears stable, patient reports having recent abnormal CT coronaries with plans [facility]. Continue aspirin and statin    Discussed with primary team. Cardiology team to follow along tomorrow.    Phyllis Darnell MD  Cardiology

## 2021-11-10 NOTE — PROGRESS NOTE ADULT - SUBJECTIVE AND OBJECTIVE BOX
Patient is a 76y old  Male who presents with a chief complaint of dizziness, hypotension, hypoglycemia (10 Nov 2021 07:20)    Patient seen and examined at bedside. Patient is feeling "almost normal," denies shortness of breath, denies pain     ALLERGIES:  No Known Allergies    MEDICATIONS  (STANDING):  aspirin enteric coated 81 milliGRAM(s) Oral daily  atorvastatin 80 milliGRAM(s) Oral at bedtime  cholecalciferol 400 Unit(s) Oral daily  cyanocobalamin 1000 MICROGram(s) Oral daily  dextrose 40% Gel 15 Gram(s) Oral once  dextrose 5%. 1000 milliLiter(s) (50 mL/Hr) IV Continuous <Continuous>  dextrose 5%. 1000 milliLiter(s) (100 mL/Hr) IV Continuous <Continuous>  dextrose 50% Injectable 25 Gram(s) IV Push once  dextrose 50% Injectable 12.5 Gram(s) IV Push once  dextrose 50% Injectable 25 Gram(s) IV Push once  gabapentin 300 milliGRAM(s) Oral three times a day  glucagon  Injectable 1 milliGRAM(s) IntraMuscular once  heparin   Injectable 5000 Unit(s) SubCutaneous every 8 hours  insulin glargine Injectable (LANTUS) 10 Unit(s) SubCutaneous every morning  insulin lispro (ADMELOG) corrective regimen sliding scale   SubCutaneous three times a day before meals  insulin lispro (ADMELOG) corrective regimen sliding scale   SubCutaneous at bedtime  lactobacillus acidophilus 1 Tablet(s) Oral daily  pantoprazole    Tablet 40 milliGRAM(s) Oral before breakfast  tamsulosin 0.4 milliGRAM(s) Oral at bedtime  tiotropium 18 MICROgram(s) Capsule 1 Capsule(s) Inhalation daily    MEDICATIONS  (PRN):  acetaminophen     Tablet .. 650 milliGRAM(s) Oral every 6 hours PRN Temp greater or equal to 38C (100.4F), Mild Pain (1 - 3)    Vital Signs Last 24 Hrs  T(F): 98 (10 Nov 2021 08:02), Max: 98.7 (10 Nov 2021 05:26)  HR: 63 (10 Nov 2021 09:16) (63 - 75)  BP: 134/67 (10 Nov 2021 08:02) (109/66 - 146/67)  RR: 18 (10 Nov 2021 08:02) (17 - 21)  SpO2: 97% (10 Nov 2021 09:16) (95% - 98%)    I&O's Summary  2021 07:01  -  10 Nov 2021 07:00  --------------------------------------------------------  IN: 200 mL / OUT: 750 mL / NET: -550 mL    10 Nov 2021 07:01  -  10 Nov 2021 10:14  --------------------------------------------------------  IN: 240 mL / OUT: 400 mL / NET: -160 mL    PHYSICAL EXAM:  GENERAL: NAD, well-groomed, well-developed  HEAD:  Atraumatic, Normocephalic  EYES: EOMI, conjunctiva and sclera clear  ENMT: Moist mucous membranes, Good dentition, no thrush  NECK: Supple, No JVD  CHEST/LUNG: Clear to auscultation bilaterally, good air entry, non-labored breathing  HEART: RRR; S1/S2, No murmur  ABDOMEN: Soft, Nontender, Nondistended; Bowel sounds present  VASCULAR: Normal pulses, Normal capillary refill  EXTREMITIES: No calf tenderness, No cyanosis, No edema  SKIN: Warm, Intact  NERVOUS SYSTEM:  A/O x3, No focal deficits    LABS:                        12.5   7.05  )-----------( 212      ( 10 Nov 2021 07:10 )             38.6     11-10    135  |  101  |  41  ----------------------------<  174  5.1   |  28  |  2.26    Ca    10.1      10 Nov 2021 07:10  Mg     1.9     11-10    TPro  7.2  /  Alb  3.2  /  TBili  0.2  /  DBili  x   /  AST  17  /  ALT  36  /  AlkPhos  74  11-10    eGFR if Non African American: 28 mL/min/1.73M2 (11-10-21 @ 07:10)  eGFR if African American: 32 mL/min/1.73M2 (11-10-21 @ 07:10)    CARDIAC MARKERS ( 10 Nov 2021 07:10 )  x     / x     / 173 U/L / x     / x        POCT Blood Glucose.: 148 mg/dL (10 Nov 2021 07:56)  POCT Blood Glucose.: 172 mg/dL (10 Nov 2021 00:57)  POCT Blood Glucose.: 123 mg/dL (2021 23:05)  POCT Blood Glucose.: 116 mg/dL (2021 21:59)  POCT Blood Glucose.: 82 mg/dL (2021 19:14)  POCT Blood Glucose.: 63 mg/dL (2021 18:48)  POCT Blood Glucose.: 63 mg/dL (2021 18:30)  POCT Blood Glucose.: 57 mg/dL (2021 18:29)    Urinalysis Basic - ( 10 Nov 2021 02:56 )    Color: Yellow / Appearance: Clear / S.015 / pH: x  Gluc: x / Ketone: Negative  / Bili: Negative / Urobili: Negative   Blood: x / Protein: Negative / Nitrite: Negative   Leuk Esterase: Negative / RBC: 0-4 /HPF / WBC 0-2 /HPF   Sq Epi: x / Non Sq Epi: Neg.-Few / Bacteria: Negative /HPF    COVID-19 PCR: NotDetec (21 @ 19:55)      RADIOLOGY & ADDITIONAL TESTS:    Care Discussed with Consultants/Other Providers:

## 2021-11-10 NOTE — CONSULT NOTE ADULT - REASON FOR ADMISSION
dizziness, hypotension, hypoglycemia

## 2021-11-11 ENCOUNTER — TRANSCRIPTION ENCOUNTER (OUTPATIENT)
Age: 76
End: 2021-11-11

## 2021-11-11 LAB
ANION GAP SERPL CALC-SCNC: 7 MMOL/L — SIGNIFICANT CHANGE UP (ref 5–17)
BUN SERPL-MCNC: 32 MG/DL — HIGH (ref 7–23)
CALCIUM SERPL-MCNC: 10.5 MG/DL — SIGNIFICANT CHANGE UP (ref 8.4–10.5)
CHLORIDE SERPL-SCNC: 105 MMOL/L — SIGNIFICANT CHANGE UP (ref 96–108)
CO2 SERPL-SCNC: 26 MMOL/L — SIGNIFICANT CHANGE UP (ref 22–31)
CREAT SERPL-MCNC: 1.66 MG/DL — HIGH (ref 0.5–1.3)
GLUCOSE BLDC GLUCOMTR-MCNC: 155 MG/DL — HIGH (ref 70–99)
GLUCOSE BLDC GLUCOMTR-MCNC: 155 MG/DL — HIGH (ref 70–99)
GLUCOSE BLDC GLUCOMTR-MCNC: 238 MG/DL — HIGH (ref 70–99)
GLUCOSE BLDC GLUCOMTR-MCNC: 254 MG/DL — HIGH (ref 70–99)
GLUCOSE SERPL-MCNC: 158 MG/DL — HIGH (ref 70–99)
HCT VFR BLD CALC: 39.8 % — SIGNIFICANT CHANGE UP (ref 39–50)
HGB BLD-MCNC: 12.9 G/DL — LOW (ref 13–17)
MCHC RBC-ENTMCNC: 29.5 PG — SIGNIFICANT CHANGE UP (ref 27–34)
MCHC RBC-ENTMCNC: 32.4 GM/DL — SIGNIFICANT CHANGE UP (ref 32–36)
MCV RBC AUTO: 90.9 FL — SIGNIFICANT CHANGE UP (ref 80–100)
NRBC # BLD: 0 /100 WBCS — SIGNIFICANT CHANGE UP (ref 0–0)
PLATELET # BLD AUTO: 218 K/UL — SIGNIFICANT CHANGE UP (ref 150–400)
POTASSIUM SERPL-MCNC: 5.6 MMOL/L — HIGH (ref 3.5–5.3)
POTASSIUM SERPL-SCNC: 5.6 MMOL/L — HIGH (ref 3.5–5.3)
RBC # BLD: 4.38 M/UL — SIGNIFICANT CHANGE UP (ref 4.2–5.8)
RBC # FLD: 13.5 % — SIGNIFICANT CHANGE UP (ref 10.3–14.5)
SODIUM SERPL-SCNC: 138 MMOL/L — SIGNIFICANT CHANGE UP (ref 135–145)
WBC # BLD: 5.47 K/UL — SIGNIFICANT CHANGE UP (ref 3.8–10.5)
WBC # FLD AUTO: 5.47 K/UL — SIGNIFICANT CHANGE UP (ref 3.8–10.5)

## 2021-11-11 PROCEDURE — 99232 SBSQ HOSP IP/OBS MODERATE 35: CPT

## 2021-11-11 PROCEDURE — 76775 US EXAM ABDO BACK WALL LIM: CPT | Mod: 26

## 2021-11-11 PROCEDURE — 99231 SBSQ HOSP IP/OBS SF/LOW 25: CPT

## 2021-11-11 RX ORDER — FUROSEMIDE 40 MG
40 TABLET ORAL DAILY
Refills: 0 | Status: DISCONTINUED | OUTPATIENT
Start: 2021-11-11 | End: 2021-11-12

## 2021-11-11 RX ORDER — AMLODIPINE BESYLATE 2.5 MG/1
10 TABLET ORAL AT BEDTIME
Refills: 0 | Status: DISCONTINUED | OUTPATIENT
Start: 2021-11-11 | End: 2021-11-12

## 2021-11-11 RX ADMIN — HEPARIN SODIUM 5000 UNIT(S): 5000 INJECTION INTRAVENOUS; SUBCUTANEOUS at 21:24

## 2021-11-11 RX ADMIN — Medication 2: at 12:09

## 2021-11-11 RX ADMIN — Medication 40 MILLIGRAM(S): at 16:10

## 2021-11-11 RX ADMIN — GABAPENTIN 300 MILLIGRAM(S): 400 CAPSULE ORAL at 16:10

## 2021-11-11 RX ADMIN — HEPARIN SODIUM 5000 UNIT(S): 5000 INJECTION INTRAVENOUS; SUBCUTANEOUS at 05:57

## 2021-11-11 RX ADMIN — Medication 1: at 08:37

## 2021-11-11 RX ADMIN — GABAPENTIN 300 MILLIGRAM(S): 400 CAPSULE ORAL at 21:24

## 2021-11-11 RX ADMIN — TAMSULOSIN HYDROCHLORIDE 0.4 MILLIGRAM(S): 0.4 CAPSULE ORAL at 21:24

## 2021-11-11 RX ADMIN — Medication 3: at 17:09

## 2021-11-11 RX ADMIN — ATORVASTATIN CALCIUM 80 MILLIGRAM(S): 80 TABLET, FILM COATED ORAL at 21:24

## 2021-11-11 RX ADMIN — Medication 81 MILLIGRAM(S): at 12:08

## 2021-11-11 RX ADMIN — GABAPENTIN 300 MILLIGRAM(S): 400 CAPSULE ORAL at 05:57

## 2021-11-11 RX ADMIN — Medication 400 UNIT(S): at 12:08

## 2021-11-11 RX ADMIN — PANTOPRAZOLE SODIUM 40 MILLIGRAM(S): 20 TABLET, DELAYED RELEASE ORAL at 05:57

## 2021-11-11 RX ADMIN — TIOTROPIUM BROMIDE 1 CAPSULE(S): 18 CAPSULE ORAL; RESPIRATORY (INHALATION) at 08:28

## 2021-11-11 RX ADMIN — PREGABALIN 1000 MICROGRAM(S): 225 CAPSULE ORAL at 12:08

## 2021-11-11 RX ADMIN — INSULIN GLARGINE 10 UNIT(S): 100 INJECTION, SOLUTION SUBCUTANEOUS at 08:38

## 2021-11-11 RX ADMIN — Medication 1 TABLET(S): at 12:07

## 2021-11-11 RX ADMIN — AMLODIPINE BESYLATE 10 MILLIGRAM(S): 2.5 TABLET ORAL at 21:24

## 2021-11-11 NOTE — PROGRESS NOTE ADULT - SUBJECTIVE AND OBJECTIVE BOX
Follow up for  SUBJ:    feels ready to go home anxious for discharge    PMH  HTN (hypertension)    HLD (hyperlipidemia)    Diabetes mellitus, type 2    CAD (coronary artery disease)    Chronic CHF    CKD (chronic kidney disease)        MEDICATIONS  (STANDING):  amLODIPine   Tablet 10 milliGRAM(s) Oral at bedtime  aspirin enteric coated 81 milliGRAM(s) Oral daily  atorvastatin 80 milliGRAM(s) Oral at bedtime  cholecalciferol 400 Unit(s) Oral daily  cyanocobalamin 1000 MICROGram(s) Oral daily  dextrose 40% Gel 15 Gram(s) Oral once  dextrose 5%. 1000 milliLiter(s) (50 mL/Hr) IV Continuous <Continuous>  dextrose 5%. 1000 milliLiter(s) (100 mL/Hr) IV Continuous <Continuous>  dextrose 50% Injectable 25 Gram(s) IV Push once  dextrose 50% Injectable 12.5 Gram(s) IV Push once  dextrose 50% Injectable 25 Gram(s) IV Push once  furosemide    Tablet 40 milliGRAM(s) Oral daily  gabapentin 300 milliGRAM(s) Oral three times a day  glucagon  Injectable 1 milliGRAM(s) IntraMuscular once  heparin   Injectable 5000 Unit(s) SubCutaneous every 8 hours  insulin glargine Injectable (LANTUS) 10 Unit(s) SubCutaneous every morning  insulin lispro (ADMELOG) corrective regimen sliding scale   SubCutaneous three times a day before meals  insulin lispro (ADMELOG) corrective regimen sliding scale   SubCutaneous at bedtime  lactobacillus acidophilus 1 Tablet(s) Oral daily  pantoprazole    Tablet 40 milliGRAM(s) Oral before breakfast  tamsulosin 0.4 milliGRAM(s) Oral at bedtime  tiotropium 18 MICROgram(s) Capsule 1 Capsule(s) Inhalation daily    MEDICATIONS  (PRN):  acetaminophen     Tablet .. 650 milliGRAM(s) Oral every 6 hours PRN Temp greater or equal to 38C (100.4F), Mild Pain (1 - 3)        PHYSICAL EXAM:  Vital Signs Last 24 Hrs  T(C): 37 (11 Nov 2021 05:11), Max: 37.1 (10 Nov 2021 23:49)  T(F): 98.6 (11 Nov 2021 05:11), Max: 98.8 (10 Nov 2021 23:49)  HR: 69 (11 Nov 2021 08:30) (60 - 70)  BP: 134/59 (11 Nov 2021 05:11) (134/59 - 157/75)  BP(mean): 102 (10 Nov 2021 21:58) (102 - 102)  RR: 16 (11 Nov 2021 05:11) (16 - 18)  SpO2: 96% (11 Nov 2021 08:30) (96% - 100%)    GENERAL: NAD, well-groomed, well-developed  HEAD:  Atraumatic, Normocephalic  EYES: EOMI, PERRLA, conjunctiva and sclera clear  ENT: Moist mucous membranes,  NECK: Supple, No JVD, no bruits  CHEST/LUNG: Clear to percussion bilaterally; No rales, rhonchi, wheezing, or rubs  HEART: Regular rate and rhythm; No murmurs, rubs, or gallops PMI non displaced.  ABDOMEN: Soft, Nontender, Nondistended; Bowel sounds present  EXTREMITIES:  2+ Peripheral Pulses, No clubbing, cyanosis, or edema  SKIN: No rashes or lesions  NERVOUS SYSTEM:  Cranial Nerves II-XII intact      TELEMETRY:    rsr    ECG:    < from: 12 Lead ECG (11.09.21 @ 19:48) >  Ventricular Rate 67 BPM    Atrial Rate 67 BPM    P-R Interval 180 ms    QRS Duration 98 ms    Q-T Interval 394 ms    QTC Calculation(Bazett) 416 ms    P Axis 12 degrees    R Axis 12 degrees    T Axis 77 degrees    Diagnosis Line Normal sinus rhythm  Nonspecific T wave abnormality        Confirmed by Taj Darnell ([id number]) on 11/10/2021 8:37:25 AM    < end of copied text >    ECHO:    < from: TTE Echo Complete w/o Contrast w/ Doppler (11.10.21 @ 07:26) >  Summary:   1. Technically difficult study with poorendocardial visualization.   2. Normal global left ventricular systolic function.   3. Left ventricular ejection fraction, by visual estimation, is 55 to 60%.   4. Normal left ventricular internal cavity size.   5. Mildly increased septal wall thickness.   6. The right ventricle is not well visualized, appears to be normal in size in subcostal view.   7. The left atrium is normal in size.   8. The right atrium is normal in size.   9. Mild thickening and calcification of the anterior mitral valve leaflet.  10. Mild mitral valve regurgitation.  11. Mild tricuspid regurgitation.  12. The aortic valve is not well visualized, appears to have calcification with decreased aortic opening.  13. Trivial pericardial effusion.  14. Recommend clinical correlation with the above findings.    [id number] Phyllis Darnell MD Electronically signed on 11/10/2021 at 1:49:34 PM      *** Final ***    TAJ DARNELL   This document has been electronically signed. Nov 10 2021  7:26AM    < end of copied text >      LABS:                        12.9   5.47  )-----------( 218      ( 11 Nov 2021 05:30 )             39.8     11-11    138  |  105  |  32<H>  ----------------------------<  158<H>  5.6<H>   |  26  |  1.66<H>    Ca    10.5      11 Nov 2021 05:30  Mg     1.9     11-10    TPro  7.2  /  Alb  3.2<L>  /  TBili  0.2  /  DBili  x   /  AST  17  /  ALT  36  /  AlkPhos  74  11-10    CARDIAC MARKERS ( 10 Nov 2021 07:10 )  x     / x     / 173 U/L / x     / x      I&O's Summary    10 Nov 2021 07:01  -  11 Nov 2021 07:00  --------------------------------------------------------  IN: 480 mL / OUT: 400 mL / NET: 80 mL    11 Nov 2021 07:01  -  11 Nov 2021 15:31  --------------------------------------------------------  IN: 240 mL / OUT: 240 mL / NET: 0 mL      BNP    RADIOLOGY & ADDITIONAL STUDIES:    < from: Xray Chest 1 View AP/PA (11.09.21 @ 18:47) >  IMPRESSION: Negative chest.    --- End of Report ---      ALYSSA GONZALES MD; Attending Radiologist  This document has been electronically signed. Nov 10 2021  4:42PM    < end of copied text >      ECHO:      impression  hfpef   renal indices improving creat 2.26 now 1.66. continue furosemide 40    dw dr amador

## 2021-11-11 NOTE — DISCHARGE NOTE PROVIDER - NSDCCPCAREPLAN_GEN_ALL_CORE_FT
PRINCIPAL DISCHARGE DIAGNOSIS  Diagnosis: Hypoglycemia  Assessment and Plan of Treatment:       SECONDARY DISCHARGE DIAGNOSES  Diagnosis: Hypoglycemia  Assessment and Plan of Treatment:

## 2021-11-11 NOTE — PROGRESS NOTE ADULT - SUBJECTIVE AND OBJECTIVE BOX
Patient is a 76y old  Male who presents with a chief complaint of dizziness, hypotension, hypoglycemia (2021 16:10)      Interval History: Finger-sticks are in high 100's and low 200's   on Lantus 10 units and Lispro sliding scale coverage with meals   Creatinine much improved 1,6(2.6)    MEDICATIONS  (STANDING):  amLODIPine   Tablet 10 milliGRAM(s) Oral at bedtime  aspirin enteric coated 81 milliGRAM(s) Oral daily  atorvastatin 80 milliGRAM(s) Oral at bedtime  cholecalciferol 400 Unit(s) Oral daily  cyanocobalamin 1000 MICROGram(s) Oral daily  dextrose 40% Gel 15 Gram(s) Oral once  dextrose 5%. 1000 milliLiter(s) (50 mL/Hr) IV Continuous <Continuous>  dextrose 5%. 1000 milliLiter(s) (100 mL/Hr) IV Continuous <Continuous>  dextrose 50% Injectable 25 Gram(s) IV Push once  dextrose 50% Injectable 12.5 Gram(s) IV Push once  dextrose 50% Injectable 25 Gram(s) IV Push once  furosemide    Tablet 40 milliGRAM(s) Oral daily  gabapentin 300 milliGRAM(s) Oral three times a day  glucagon  Injectable 1 milliGRAM(s) IntraMuscular once  heparin   Injectable 5000 Unit(s) SubCutaneous every 8 hours  insulin glargine Injectable (LANTUS) 10 Unit(s) SubCutaneous every morning  insulin lispro (ADMELOG) corrective regimen sliding scale   SubCutaneous three times a day before meals  insulin lispro (ADMELOG) corrective regimen sliding scale   SubCutaneous at bedtime  lactobacillus acidophilus 1 Tablet(s) Oral daily  pantoprazole    Tablet 40 milliGRAM(s) Oral before breakfast  tamsulosin 0.4 milliGRAM(s) Oral at bedtime  tiotropium 18 MICROgram(s) Capsule 1 Capsule(s) Inhalation daily    MEDICATIONS  (PRN):  acetaminophen     Tablet .. 650 milliGRAM(s) Oral every 6 hours PRN Temp greater or equal to 38C (100.4F), Mild Pain (1 - 3)      Allergies    No Known Allergies    Intolerances        REVIEW OF SYSTEMS:  CONSTITUTIONAL: no changes  EYES: No eye pain, visual disturbances, or discharge  ENMT:  No difficulty hearing, No sinus or throat pain  NECK: No pain or stiffness  RESPIRATORY: No cough, wheezing, chills or hemoptysis; No shortness of breath  CARDIOVASCULAR: No chest pain, palpitations or leg swelling  GASTROINTESTINAL: No abdominal or epigastric pain. No nausea, vomiting, or hematemesis; No diarrhea or constipation. No melena or hematochezia.  GENITOURINARY: No dysuria, frequency, hematuria, or incontinence  NEUROLOGICAL: No headaches, memory loss, loss of strength, numbness, or tremors  SKIN: No itching, burning, rashes, or lesions   ENDOCRINE: No heat or cold intolerance; No hair loss  MUSCULOSKELETAL: No joint pain or swelling; No muscle, back, or extremity pain  PSYCHIATRIC: No depression, anxiety, mood swings, or difficulty sleeping  HEME/LYMPH: No easy bruising, or bleeding gums  ALLERY AND IMMUNOLOGIC: No hives or eczema    Vital Signs Last 24 Hrs  T(C): 37.1 (2021 19:56), Max: 37.1 (10 Nov 2021 23:49)  T(F): 98.7 (2021 19:56), Max: 98.8 (10 Nov 2021 23:49)  HR: 62 (2021 19:56) (60 - 70)  BP: 158/75 (2021 19:56) (134/59 - 158/75)  BP(mean): --  RR: 16 (2021 19:56) (16 - 18)  SpO2: 100% (2021 19:56) (96% - 100%)    PHYSICAL EXAM:  GENERAL:   HEAD: Atraumatic, Normocephalic  EYES: PERRLA, conjunctiva and sclera clear  ENMT: No  exudates,; Moist mucous membranes,, No lesions  NECK: Supple, No JVD, Normal thyroid  NERVOUS SYSTEM:  Alert & Oriented,   CHEST/LUNG: Clear to auscultation bilaterally; No rales, rhonchi, wheezing, or rubs  HEART: Regular rate and rhythm; No murmurs, rubs, or gallops  ABDOMEN: Soft, Nontender, Nondistended; Bowel sounds present  EXTREMITIES:  2+ Peripheral Pulses, no edema  SKIN: No rashes or lesions    LABS:      Urinalysis Basic - ( 10 Nov 2021 02:56 )    Color: Yellow / Appearance: Clear / S.015 / pH: x  Gluc: x / Ketone: Negative  / Bili: Negative / Urobili: Negative   Blood: x / Protein: Negative / Nitrite: Negative   Leuk Esterase: Negative / RBC: 0-4 /HPF / WBC 0-2 /HPF   Sq Epi: x / Non Sq Epi: Neg.-Few / Bacteria: Negative /HPF      CAPILLARY BLOOD GLUCOSE      POCT Blood Glucose.: 155 mg/dL (2021 21:22)  POCT Blood Glucose.: 254 mg/dL (2021 17:03)  POCT Blood Glucose.: 238 mg/dL (2021 12:08)  POCT Blood Glucose.: 155 mg/dL (2021 08:26)    Lipid panel:   CARDIAC MARKERS ( 10 Nov 2021 07:10 )  x     / x     / 173 U/L / x     / x              Thyroid:  Diabetes Tests:  Parathyroid Panel:  Adrenals:  RADIOLOGY & ADDITIONAL TESTS:    Imaging Personally Reviewed:  [ ] YES  [ ] NO    Consultant(s) Notes Reviewed:  [ ] YES  [ ] NO    Care Discussed with Consultants/Other Providers [ ] YES  [ ] NO

## 2021-11-11 NOTE — DISCHARGE NOTE PROVIDER - CARE PROVIDER_API CALL
Hawk Krueger)  EndocrinologyMetabDiabetes  [address], [address]  [place] [id number]  Phone: [phone]  Fax: [phone]  Follow Up Time:

## 2021-11-11 NOTE — DISCHARGE NOTE PROVIDER - NSDCFUADDAPPT_GEN_ALL_CORE_FT
[facility]  [facility] with [facility] at [facility] or you can follow up with endocrinology at [facility]: Dr Krueger

## 2021-11-11 NOTE — PROGRESS NOTE ADULT - ASSESSMENT
76M hx of HTN, HLD, CAD/1vl CABG, CHF, CKD, BPH, PAD, T2DM on insulin, GERD, bladder cancer pw dizziness with hypotension and hypoglycemia    #Hypotension likely sec to overdiuresis.   - Antihypertensives and diuretics have been on home (home meds: amlodipine 10mg, lasix 40mg, lisinopril 40mg, spironolactone 25mg)-->restarted Norvasc and lisinopril today  - Received 500ml  NS in ED    #DOUGLAS on CKD likely sec to above.  - Holding diuretics, ACE, spironolactone -->restarted lasix today  - PVRs low  - Renal consult appreciated  - Renal sono with no acute findings.   - Cr improved from 2.86 --> 2.26-->1/6. Patient is unsure of baseline but believes it is ~1.4--[facility]     #CAD/CHFpEF  - Restarted laisx 40 mg daily on 11/11  - TTE with EF 55-60%, nml LV function  - Trops negative x2  - Cardiology consult appreciated  - Of note, patient had a CT coronary outpatient earlier this week and he reports he may need a stent   - Cont ASA/statin    #T2DM on insulin with hypoglycemia  - Home insulin: humulin 70/30, 60 units in AM, 25 units in PM  - Hypoglycemia resolved  - Continue lantus 10 units q morning, accucheck and sliding scale insulin for now  - Encourage po intake  - Endocrinology consulted.: plan to dc hgome on humulin 70/30: 40 BID   - Patient reports recent diet changes, he has been self-adjusting his insulin dosing based on his blood glucose levels at home     #HLD   - Cont statin    #BPH  - Continue flomax     #DVT/GI proph:  Heparin   - PPI    Dispo: suspect home within 24 hrs   Patient states he will update his family

## 2021-11-11 NOTE — DISCHARGE NOTE PROVIDER - HOSPITAL COURSE
76M hx of HTN, HLD, CAD/1vl CABG, CHF, CKD, BPH, PAD, T2DM on insulin, GERD, bladder cancer pw hypotension and hypoglycemia. Pt reported he was just discharged about a week ago from outside hospital for CHF exacerbation. His Lasix was doubled to 40mg bid per instruction till  but he actually only decreased it to qd yesterday. Yesterday, he noted sxs of dizziness while walking his dog outside and nearly fainting but no falls or LOC and checked his blood sugar when he went home and noted it to be in the 50s. Today recurrent sxs while he was outside and on return home noted SBP consistently in the 70s and came to ED. He denied any HA, focal weakness, SOB, CP, palps. In ED, initial FS 57 B. WBC: 10.34 BUN/cr: 42/2.86 K: 5.5 Ca:11.1 trop: 15.3 BNP: 203. s/p 500cc NS bolus in ED. In ED noted orthostasis. s/p juice and meal and now FS: 116. He has noted recently with hypoglycemia in the middle of the night and insulin was reduced from 40 U to 25U in the evening but still with recurrent nocturnal hypoglycemia. Was 235 lbs prior to CHF admission and now down to 220. FS was 110 this am and took 60U of usual 70/30 and had a [employer] egg and cheese croissant  and a doughnut as his only meal for today. Admitted to hospitalist for further management. Antihypertensives and diuretics placed on hold.  Endocrinology consulted. HgbA1c 7.6.   Endo feels with decreased Renal Gluconeogenesis secondary to Chronic Renal Insufficiency and may need less insulin  . Recommended __________ at discharge.    For cardiac workup, had TTE EF 55-60%, nml LV function.  Cardio recommended to restart PO lasix.  For CAD, patient reportedly abnormal CT coronaries and scheduled for [facility]. Continued on asa and statin    Medically stable for dc home      [facility]. 76M hx of HTN, HLD, CAD/1vl CABG, CHF, CKD, BPH, PAD, T2DM on insulin, GERD, bladder cancer pw hypotension and hypoglycemia. Pt reported he was just discharged about a week ago from outside hospital for CHF exacerbation. His Lasix was doubled to 40mg bid per instruction till  but he actually only decreased it to qd yesterday. Yesterday, he noted sxs of dizziness while walking his dog outside and nearly fainting but no falls or LOC and checked his blood sugar when he went home and noted it to be in the 50s. Today recurrent sxs while he was outside and on return home noted SBP consistently in the 70s and came to ED. He denied any HA, focal weakness, SOB, CP, palps. In ED, initial FS 57 B. WBC: 10.34 BUN/cr: 42/2.86 K: 5.5 Ca:11.1 trop: 15.3 BNP: 203. s/p 500cc NS bolus in ED. In ED noted orthostasis. s/p juice and meal and now FS: 116. He has noted recently with hypoglycemia in the middle of the night and insulin was reduced from 40 U to 25U in the evening but still with recurrent nocturnal hypoglycemia. Was 235 lbs prior to CHF admission and now down to 220. FS was 110 this am and took 60U of usual 70/30 and had a [employer] egg and cheese croissant  and a doughnut as his only meal for today. Admitted to hospitalist for further management. Antihypertensives and diuretics placed on hold.  Endocrinology consulted. HgbA1c 7.6.   Endo feels with decreased Renal Gluconeogenesis secondary to Chronic Renal Insufficiency and may need less insulin  . Recommended Novolin 70/30 : 15 iu BID and to hold metformin. He will [facility]     For cardiac workup, had TTE EF 55-60%, nml LV function.  Cardio recommended to restart PO lasix.  For CAD, patient reportedly abnormal CT coronaries and scheduled for [facility]. Continued on asa and statin    Medically stable for dc home      [facility] contact [facility]- [facility]. [phone] 76M hx of HTN, HLD, CAD/1vl CABG, CHF, CKD, BPH, PAD, T2DM on insulin, GERD, bladder cancer pw hypotension and hypoglycemia. Pt reported he was just discharged about a week ago from outside hospital for CHF exacerbation. His Lasix was doubled to 40mg bid per instruction till  but he actually only decreased it to qd yesterday. Yesterday, he noted sxs of dizziness while walking his dog outside and nearly fainting but no falls or LOC and checked his blood sugar when he went home and noted it to be in the 50s. Today recurrent sxs while he was outside and on return home noted SBP consistently in the 70s and came to ED. He denied any HA, focal weakness, SOB, CP, palps. In ED, initial FS 57 B. WBC: 10.34 BUN/cr: 42/2.86 K: 5.5 Ca:11.1 trop: 15.3 BNP: 203. s/p 500cc NS bolus in ED. In ED noted orthostasis. s/p juice and meal and now FS: 116. He has noted recently with hypoglycemia in the middle of the night and insulin was reduced from 40 U to 25U in the evening but still with recurrent nocturnal hypoglycemia. Was 235 lbs prior to CHF admission and now down to 220. FS was 110 this am and took 60U of usual 70/30 and had a [employer] egg and cheese croissant  and a doughnut as his only meal for today. Admitted to hospitalist for further management. Antihypertensives and diuretics placed on hold.  Endocrinology consulted. HgbA1c 7.6.   Endo feels with decreased Renal Gluconeogenesis secondary to Chronic Renal Insufficiency and may need less insulin  . Recommended Novolin 70/30 : 15 iu BID and to hold metformin. He will follow [facility]     For cardiac workup, had TTE EF 55-60%, nml LV function.  Cardio recommended to restart PO lasix.  For CAD, patient reportedly abnormal CT coronaries and scheduled for [facility]. Continued on asa and statin    Medically stable for dc home      [facility]- [facility]. [phone]    I, the attending physician, was physically present for the key portions of the evaluation and management (E/M) service provided. The total amount of time spent reviewing the hospital course, laboratory values, imaging findings, assessing/counseling the patient, discussing with consultant physicians, social work, nursing staff was 32 minutes.

## 2021-11-11 NOTE — DISCHARGE NOTE PROVIDER - NSDCMRMEDTOKEN_GEN_ALL_CORE_FT
amLODIPine 10 mg oral tablet: 1 tab(s) orally once a day  aspirin 81 mg oral tablet: 1 tab(s) orally once a day  Bacid (LAC) oral capsule: 1 cap(s) orally once a day  cyanocobalamin 1000 mcg oral tablet: 1 tab(s) orally once a day  Flomax 0.4 mg oral capsule: 1 cap(s) orally once a day  gabapentin 300 mg oral capsule: 2 cap(s) orally 3 times a day  HumuLIN 70/30 subcutaneous suspension: 60 U qam and 25 u qpm  Lasix 40 mg oral tablet: 1 tab(s) orally once a day  Lipitor 80 mg oral tablet: 1 tab(s) orally once a day  lisinopril 40 mg oral tablet: 1 tab(s) orally once a day  metFORMIN 1000 mg oral tablet: 1 tab(s) orally 2 times a day  sildenafil 100 mg oral tablet:   Spiriva 18 mcg inhalation capsule: 1 cap(s) inhaled once a day  spironolactone 25 mg oral tablet: 1 tab(s) orally once a day  Vitamin D3 400 intl units (10 mcg) oral tablet: 1 tab(s) orally once a day  Zetia 10 mg oral tablet: 1 tab(s) orally once a day   amLODIPine 10 mg oral tablet: 1 tab(s) orally once a day  aspirin 81 mg oral tablet: 1 tab(s) orally once a day  Bacid (LAC) oral capsule: 1 cap(s) orally once a day  cyanocobalamin 1000 mcg oral tablet: 1 tab(s) orally once a day  Flomax 0.4 mg oral capsule: 1 cap(s) orally once a day  gabapentin 300 mg oral capsule: 2 cap(s) orally 3 times a day  HumuLIN 70/30 subcutaneous suspension: 15 unit(s) subcutaneous 2 times a day  Lasix 40 mg oral tablet: 1 tab(s) orally once a day  Lipitor 80 mg oral tablet: 1 tab(s) orally once a day  lisinopril 40 mg oral tablet: 1 tab(s) orally once a day  Spiriva 18 mcg inhalation capsule: 1 cap(s) inhaled once a day  Vitamin D3 400 intl units (10 mcg) oral tablet: 1 tab(s) orally once a day  Zetia 10 mg oral tablet: 1 tab(s) orally once a day   amLODIPine 10 mg oral tablet: 1 tab(s) orally once a day  aspirin 81 mg oral tablet: 1 tab(s) orally once a day  Bacid (LAC) oral capsule: 1 cap(s) orally once a day  cyanocobalamin 1000 mcg oral tablet: 1 tab(s) orally once a day  Flomax 0.4 mg oral capsule: 1 cap(s) orally once a day  gabapentin 300 mg oral capsule: 2 cap(s) orally 3 times a day  HumuLIN 70/30 subcutaneous suspension: 15 unit(s) subcutaneous 2 times a day  Lasix 40 mg oral tablet: 1 tab(s) orally once a day  Lipitor 80 mg oral tablet: 1 tab(s) orally once a day  Spiriva 18 mcg inhalation capsule: 1 cap(s) inhaled once a day  Vitamin D3 400 intl units (10 mcg) oral tablet: 1 tab(s) orally once a day  Zetia 10 mg oral tablet: 1 tab(s) orally once a day

## 2021-11-11 NOTE — PROGRESS NOTE ADULT - SUBJECTIVE AND OBJECTIVE BOX
Feels good, denies complaints    Vital Signs Last 24 Hrs  T(C): 37 (21 @ 05:11), Max: 37.1 (11-10-21 @ 23:49)  T(F): 98.6 (21 @ 05:11), Max: 98.8 (11-10-21 @ 23:49)  HR: 69 (21 @ 08:30) (60 - 70)  BP: 134/59 (21 @ 05:11) (134/59 - 157/75)  BP(mean): 102 (11-10-21 @ 21:58) (102 - 102)  RR: 16 (21 @ 05:11) (16 - 18)  SpO2: 96% (21 @ 08:30) (96% - 100%)    s1s2  b/l air entry  soft  no edema                        12.9   5.47  )-----------( 218      ( 2021 05:30 )             39.8     2021 05:30    138    |  105    |  32     ----------------------------<  158    5.6     |  26     |  1.66     Ca    10.5       2021 05:30  Mg     1.9       10 Nov 2021 07:10    TPro  7.2    /  Alb  3.2    /  TBili  0.2    /  DBili  x      /  AST  17     /  ALT  36     /  AlkPhos  74     10 Nov 2021 07:10    LIVER FUNCTIONS - ( 10 Nov 2021 07:10 )  Alb: 3.2 g/dL / Pro: 7.2 g/dL / ALK PHOS: 74 U/L / ALT: 36 U/L / AST: 17 U/L / GGT: x           CARDIAC MARKERS ( 10 Nov 2021 07:10 )  x     / x     / 173 U/L / x     / x        Urinalysis Basic - ( 10 Nov 2021 02:56 )    Color: Yellow / Appearance: Clear / S.015 / pH: x  Gluc: x / Ketone: Negative  / Bili: Negative / Urobili: Negative   Blood: x / Protein: Negative / Nitrite: Negative   Leuk Esterase: Negative / RBC: 0-4 /HPF / WBC 0-2 /HPF   Sq Epi: x / Non Sq Epi: Neg.-Few / Bacteria: Negative /HPF    Home Medications:  amLODIPine 10 mg oral tablet: 1 tab(s) orally once a day (2021 21:57)  aspirin 81 mg oral tablet: 1 tab(s) orally once a day (2021 21:58)  Bacid (LAC) oral capsule: 1 cap(s) orally once a day (2021 22:00)  cyanocobalamin 1000 mcg oral tablet: 1 tab(s) orally once a day (:59)  Flomax 0.4 mg oral capsule: 1 cap(s) orally once a day (2021 22:01)  gabapentin 300 mg oral capsule: 2 cap(s) orally 3 times a day (:59)  HumuLIN 70/30 subcutaneous suspension: 60 U qam and 25 u qpm (2021 22:00)  Lasix 40 mg oral tablet: 1 tab(s) orally once a day (2021 21:58)  Lipitor 80 mg oral tablet: 1 tab(s) orally once a day (:58)  lisinopril 40 mg oral tablet: 1 tab(s) orally once a day (2021 22:00)  metFORMIN 1000 mg oral tablet: 1 tab(s) orally 2 times a day (2021 22:00)  sildenafil 100 mg oral tablet:  (2021 22:00)  Spiriva 18 mcg inhalation capsule: 1 cap(s) inhaled once a day (2021 22:01)  spironolactone 25 mg oral tablet: 1 tab(s) orally once a day (2021 22:01)  Vitamin D3 400 intl units (10 mcg) oral tablet: 1 tab(s) orally once a day (2021 21:58)  Zetia 10 mg oral tablet: 1 tab(s) orally once a day (:59)      acetaminophen     Tablet .. 650 milliGRAM(s) Oral every 6 hours PRN  amLODIPine   Tablet 10 milliGRAM(s) Oral at bedtime  aspirin enteric coated 81 milliGRAM(s) Oral daily  atorvastatin 80 milliGRAM(s) Oral at bedtime  cholecalciferol 400 Unit(s) Oral daily  cyanocobalamin 1000 MICROGram(s) Oral daily  dextrose 40% Gel 15 Gram(s) Oral once  dextrose 5%. 1000 milliLiter(s) IV Continuous <Continuous>  dextrose 5%. 1000 milliLiter(s) IV Continuous <Continuous>  dextrose 50% Injectable 25 Gram(s) IV Push once  dextrose 50% Injectable 12.5 Gram(s) IV Push once  dextrose 50% Injectable 25 Gram(s) IV Push once  furosemide    Tablet 40 milliGRAM(s) Oral daily  gabapentin 300 milliGRAM(s) Oral three times a day  glucagon  Injectable 1 milliGRAM(s) IntraMuscular once  heparin   Injectable 5000 Unit(s) SubCutaneous every 8 hours  insulin glargine Injectable (LANTUS) 10 Unit(s) SubCutaneous every morning  insulin lispro (ADMELOG) corrective regimen sliding scale   SubCutaneous three times a day before meals  insulin lispro (ADMELOG) corrective regimen sliding scale   SubCutaneous at bedtime  lactobacillus acidophilus 1 Tablet(s) Oral daily  pantoprazole    Tablet 40 milliGRAM(s) Oral before breakfast  tamsulosin 0.4 milliGRAM(s) Oral at bedtime  tiotropium 18 MICROgram(s) Capsule 1 Capsule(s) Inhalation daily    A/P:    Hx HTN, DM, CAD, CABG  Hemodynamic DOUGLAS improving, although baseline renal fx is unknown  Would d/c off ACE/ARB, Aldactone given DOUGLAS and higher K  Agree w/Lasix  No objection to Metformin  UA negative  Renal SONO w/o hydro  F/u BMP  F/u w/PMD, renal cardiology as op  No NSAID's    [phone]

## 2021-11-11 NOTE — PROGRESS NOTE ADULT - SUBJECTIVE AND OBJECTIVE BOX
Patient is a 76y old  Male who presents with a chief complaint of dizziness, hypotension, hypoglycemia (10 Nov 2021 23:48)      Patient seen and examined at bedside.    ALLERGIES:  No Known Allergies    MEDICATIONS  (STANDING):  amLODIPine   Tablet 10 milliGRAM(s) Oral at bedtime  aspirin enteric coated 81 milliGRAM(s) Oral daily  atorvastatin 80 milliGRAM(s) Oral at bedtime  cholecalciferol 400 Unit(s) Oral daily  cyanocobalamin 1000 MICROGram(s) Oral daily  dextrose 40% Gel 15 Gram(s) Oral once  dextrose 5%. 1000 milliLiter(s) (50 mL/Hr) IV Continuous <Continuous>  dextrose 5%. 1000 milliLiter(s) (100 mL/Hr) IV Continuous <Continuous>  dextrose 50% Injectable 25 Gram(s) IV Push once  dextrose 50% Injectable 12.5 Gram(s) IV Push once  dextrose 50% Injectable 25 Gram(s) IV Push once  gabapentin 300 milliGRAM(s) Oral three times a day  glucagon  Injectable 1 milliGRAM(s) IntraMuscular once  heparin   Injectable 5000 Unit(s) SubCutaneous every 8 hours  insulin glargine Injectable (LANTUS) 10 Unit(s) SubCutaneous every morning  insulin lispro (ADMELOG) corrective regimen sliding scale   SubCutaneous three times a day before meals  insulin lispro (ADMELOG) corrective regimen sliding scale   SubCutaneous at bedtime  lactobacillus acidophilus 1 Tablet(s) Oral daily  pantoprazole    Tablet 40 milliGRAM(s) Oral before breakfast  tamsulosin 0.4 milliGRAM(s) Oral at bedtime  tiotropium 18 MICROgram(s) Capsule 1 Capsule(s) Inhalation daily    MEDICATIONS  (PRN):  acetaminophen     Tablet .. 650 milliGRAM(s) Oral every 6 hours PRN Temp greater or equal to 38C (100.4F), Mild Pain (1 - 3)    Vital Signs Last 24 Hrs  T(F): 98.6 (2021 05:11), Max: 98.8 (10 Nov 2021 23:49)  HR: 69 (2021 08:30) (60 - 70)  BP: 134/59 (2021 05:11) (134/59 - 157/75)  RR: 16 (2021 05:11) (16 - 18)  SpO2: 96% (2021 08:30) (96% - 100%)  I&O's Summary    10 Nov 2021 07:01  -  2021 07:00  --------------------------------------------------------  IN: 480 mL / OUT: 400 mL / NET: 80 mL    2021 07:01  -  2021 11:03  --------------------------------------------------------  IN: 0 mL / OUT: 240 mL / NET: -240 mL      BMI (kg/m2): 30.7 (21 @ 23:11)  PHYSICAL EXAM:  General: NAD, A/O x 3  ENT: MMM, no thrush  Neck: Supple, No JVD  Lungs: Non labored breathing,  Clear to auscultation bilaterally,   Cardio: RRR, S1/S2, No murmurs, no pitting edema bilaterally  Abdomen: Soft, Nontender, Nondistended; Bowel sounds present  Extremities: No calf tenderness, moves all extremities    LABS:                        12.9   5.47  )-----------( 218      ( 2021 05:30 )             39.8           138  |  105  |  32  ----------------------------<  158  5.6   |  26  |  1.66    Ca    10.5      2021 05:30  Mg     1.9     11-10    TPro  7.2  /  Alb  3.2  /  TBili  0.2  /  DBili  x   /  AST  17  /  ALT  36  /  AlkPhos  74  11-10     eGFR if Non African American: 40 mL/min/1.73M2 (21 @ 05:30)  eGFR if African American: 46 mL/min/1.73M2 (21 @ 05:30)         CARDIAC MARKERS ( 10 Nov 2021 07:10 )  x     / x     / 173 U/L / x     / x                        POCT Blood Glucose.: 155 mg/dL (2021 08:26)  POCT Blood Glucose.: 131 mg/dL (10 Nov 2021 21:48)  POCT Blood Glucose.: 164 mg/dL (10 Nov 2021 16:26)  POCT Blood Glucose.: 113 mg/dL (10 Nov 2021 11:44)      Urinalysis Basic - ( 10 Nov 2021 02:56 )    Color: Yellow / Appearance: Clear / S.015 / pH: x  Gluc: x / Ketone: Negative  / Bili: Negative / Urobili: Negative   Blood: x / Protein: Negative / Nitrite: Negative   Leuk Esterase: Negative / RBC: 0-4 /HPF / WBC 0-2 /HPF   Sq Epi: x / Non Sq Epi: Neg.-Few / Bacteria: Negative /HPF        COVID-19 PCR: NotDetec (21 @ 19:55)      RADIOLOGY & ADDITIONAL TESTS:    Care Discussed with Consultants/Other Providers:    Patient is a 76y old  Male who presents with a chief complaint of dizziness, hypotension, hypoglycemia (10 Nov 2021 23:48)  No acute issues overnight     Patient seen and examined at bedside.    ALLERGIES:  No Known Allergies    MEDICATIONS  (STANDING):  amLODIPine   Tablet 10 milliGRAM(s) Oral at bedtime  aspirin enteric coated 81 milliGRAM(s) Oral daily  atorvastatin 80 milliGRAM(s) Oral at bedtime  cholecalciferol 400 Unit(s) Oral daily  cyanocobalamin 1000 MICROGram(s) Oral daily  dextrose 40% Gel 15 Gram(s) Oral once  dextrose 5%. 1000 milliLiter(s) (50 mL/Hr) IV Continuous <Continuous>  dextrose 5%. 1000 milliLiter(s) (100 mL/Hr) IV Continuous <Continuous>  dextrose 50% Injectable 25 Gram(s) IV Push once  dextrose 50% Injectable 12.5 Gram(s) IV Push once  dextrose 50% Injectable 25 Gram(s) IV Push once  gabapentin 300 milliGRAM(s) Oral three times a day  glucagon  Injectable 1 milliGRAM(s) IntraMuscular once  heparin   Injectable 5000 Unit(s) SubCutaneous every 8 hours  insulin glargine Injectable (LANTUS) 10 Unit(s) SubCutaneous every morning  insulin lispro (ADMELOG) corrective regimen sliding scale   SubCutaneous three times a day before meals  insulin lispro (ADMELOG) corrective regimen sliding scale   SubCutaneous at bedtime  lactobacillus acidophilus 1 Tablet(s) Oral daily  pantoprazole    Tablet 40 milliGRAM(s) Oral before breakfast  tamsulosin 0.4 milliGRAM(s) Oral at bedtime  tiotropium 18 MICROgram(s) Capsule 1 Capsule(s) Inhalation daily    MEDICATIONS  (PRN):  acetaminophen     Tablet .. 650 milliGRAM(s) Oral every 6 hours PRN Temp greater or equal to 38C (100.4F), Mild Pain (1 - 3)    Vital Signs Last 24 Hrs  T(F): 98.6 (2021 05:11), Max: 98.8 (10 Nov 2021 23:49)  HR: 69 (2021 08:30) (60 - 70)  BP: 134/59 (2021 05:11) (134/59 - 157/75)  RR: 16 (2021 05:11) (16 - 18)  SpO2: 96% (2021 08:30) (96% - 100%)  I&O's Summary    10 Nov 2021 07:01  -  2021 07:00  --------------------------------------------------------  IN: 480 mL / OUT: 400 mL / NET: 80 mL    2021 07:01  -  2021 11:03  --------------------------------------------------------  IN: 0 mL / OUT: 240 mL / NET: -240 mL      BMI (kg/m2): 30.7 (21 @ 23:11)  PHYSICAL EXAM:  General: NAD, A/O x 3  ENT: MMM, no thrush  Neck: Supple, No JVD  Lungs: Non labored breathing,  Clear to auscultation bilaterally,   Cardio: RRR, S1/S2, No murmurs, no pitting edema bilaterally  Abdomen: Soft, Nontender, Nondistended; Bowel sounds present  Extremities: No calf tenderness, moves all extremities    LABS:                        12.9   5.47  )-----------( 218      ( 2021 05:30 )             39.8           138  |  105  |  32  ----------------------------<  158  5.6   |  26  |  1.66    Ca    10.5      2021 05:30  Mg     1.9     -10    TPro  7.2  /  Alb  3.2  /  TBili  0.2  /  DBili  x   /  AST  17  /  ALT  36  /  AlkPhos  74  -10     eGFR if Non African American: 40 mL/min/1.73M2 (21 @ 05:30)  eGFR if African American: 46 mL/min/1.73M2 (21 @ 05:30)         CARDIAC MARKERS ( 10 Nov 2021 07:10 )  x     / x     / 173 U/L / x     / x                        POCT Blood Glucose.: 155 mg/dL (2021 08:26)  POCT Blood Glucose.: 131 mg/dL (10 Nov 2021 21:48)  POCT Blood Glucose.: 164 mg/dL (10 Nov 2021 16:26)  POCT Blood Glucose.: 113 mg/dL (10 Nov 2021 11:44)      Urinalysis Basic - ( 10 Nov 2021 02:56 )    Color: Yellow / Appearance: Clear / S.015 / pH: x  Gluc: x / Ketone: Negative  / Bili: Negative / Urobili: Negative   Blood: x / Protein: Negative / Nitrite: Negative   Leuk Esterase: Negative / RBC: 0-4 /HPF / WBC 0-2 /HPF   Sq Epi: x / Non Sq Epi: Neg.-Few / Bacteria: Negative /HPF        COVID-19 PCR: NotDetec (21 @ 19:55)      RADIOLOGY & ADDITIONAL TESTS:    Care Discussed with Consultants/Other Providers:

## 2021-11-12 ENCOUNTER — TRANSCRIPTION ENCOUNTER (OUTPATIENT)
Age: 76
End: 2021-11-12

## 2021-11-12 VITALS — OXYGEN SATURATION: 97 %

## 2021-11-12 LAB
ANION GAP SERPL CALC-SCNC: 7 MMOL/L — SIGNIFICANT CHANGE UP (ref 5–17)
BUN SERPL-MCNC: 29 MG/DL — HIGH (ref 7–23)
CALCIUM SERPL-MCNC: 10.6 MG/DL — HIGH (ref 8.4–10.5)
CHLORIDE SERPL-SCNC: 104 MMOL/L — SIGNIFICANT CHANGE UP (ref 96–108)
CO2 SERPL-SCNC: 27 MMOL/L — SIGNIFICANT CHANGE UP (ref 22–31)
CREAT SERPL-MCNC: 1.55 MG/DL — HIGH (ref 0.5–1.3)
GLUCOSE BLDC GLUCOMTR-MCNC: 177 MG/DL — HIGH (ref 70–99)
GLUCOSE BLDC GLUCOMTR-MCNC: 348 MG/DL — HIGH (ref 70–99)
GLUCOSE SERPL-MCNC: 151 MG/DL — HIGH (ref 70–99)
POTASSIUM SERPL-MCNC: 4.8 MMOL/L — SIGNIFICANT CHANGE UP (ref 3.5–5.3)
POTASSIUM SERPL-SCNC: 4.8 MMOL/L — SIGNIFICANT CHANGE UP (ref 3.5–5.3)
SODIUM SERPL-SCNC: 138 MMOL/L — SIGNIFICANT CHANGE UP (ref 135–145)

## 2021-11-12 PROCEDURE — 86803 HEPATITIS C AB TEST: CPT

## 2021-11-12 PROCEDURE — 71045 X-RAY EXAM CHEST 1 VIEW: CPT

## 2021-11-12 PROCEDURE — 93306 TTE W/DOPPLER COMPLETE: CPT

## 2021-11-12 PROCEDURE — 82550 ASSAY OF CK (CPK): CPT

## 2021-11-12 PROCEDURE — 81001 URINALYSIS AUTO W/SCOPE: CPT

## 2021-11-12 PROCEDURE — 36415 COLL VENOUS BLD VENIPUNCTURE: CPT

## 2021-11-12 PROCEDURE — 85025 COMPLETE CBC W/AUTO DIFF WBC: CPT

## 2021-11-12 PROCEDURE — 94640 AIRWAY INHALATION TREATMENT: CPT

## 2021-11-12 PROCEDURE — 83735 ASSAY OF MAGNESIUM: CPT

## 2021-11-12 PROCEDURE — 86769 SARS-COV-2 COVID-19 ANTIBODY: CPT

## 2021-11-12 PROCEDURE — 99285 EMERGENCY DEPT VISIT HI MDM: CPT | Mod: 25

## 2021-11-12 PROCEDURE — 87635 SARS-COV-2 COVID-19 AMP PRB: CPT

## 2021-11-12 PROCEDURE — 80048 BASIC METABOLIC PNL TOTAL CA: CPT

## 2021-11-12 PROCEDURE — 93005 ELECTROCARDIOGRAM TRACING: CPT

## 2021-11-12 PROCEDURE — 83036 HEMOGLOBIN GLYCOSYLATED A1C: CPT

## 2021-11-12 PROCEDURE — 96360 HYDRATION IV INFUSION INIT: CPT

## 2021-11-12 PROCEDURE — 84484 ASSAY OF TROPONIN QUANT: CPT

## 2021-11-12 PROCEDURE — 76775 US EXAM ABDO BACK WALL LIM: CPT

## 2021-11-12 PROCEDURE — 83880 ASSAY OF NATRIURETIC PEPTIDE: CPT

## 2021-11-12 PROCEDURE — 85027 COMPLETE CBC AUTOMATED: CPT

## 2021-11-12 PROCEDURE — 80053 COMPREHEN METABOLIC PANEL: CPT

## 2021-11-12 PROCEDURE — 99239 HOSP IP/OBS DSCHRG MGMT >30: CPT

## 2021-11-12 PROCEDURE — 82962 GLUCOSE BLOOD TEST: CPT

## 2021-11-12 RX ORDER — LISINOPRIL 2.5 MG/1
40 TABLET ORAL DAILY
Refills: 0 | Status: DISCONTINUED | OUTPATIENT
Start: 2021-11-12 | End: 2021-11-12

## 2021-11-12 RX ORDER — LISINOPRIL 2.5 MG/1
1 TABLET ORAL
Qty: 0 | Refills: 0 | DISCHARGE

## 2021-11-12 RX ORDER — SPIRONOLACTONE 25 MG/1
1 TABLET, FILM COATED ORAL
Qty: 0 | Refills: 0 | DISCHARGE

## 2021-11-12 RX ORDER — INSULIN NPH HUM/REG INSULIN HM 70-30/ML
0 VIAL (ML) SUBCUTANEOUS
Qty: 0 | Refills: 0 | DISCHARGE

## 2021-11-12 RX ORDER — METFORMIN HYDROCHLORIDE 850 MG/1
1 TABLET ORAL
Qty: 0 | Refills: 0 | DISCHARGE

## 2021-11-12 RX ADMIN — INSULIN GLARGINE 10 UNIT(S): 100 INJECTION, SOLUTION SUBCUTANEOUS at 08:25

## 2021-11-12 RX ADMIN — GABAPENTIN 300 MILLIGRAM(S): 400 CAPSULE ORAL at 06:02

## 2021-11-12 RX ADMIN — TIOTROPIUM BROMIDE 1 CAPSULE(S): 18 CAPSULE ORAL; RESPIRATORY (INHALATION) at 08:11

## 2021-11-12 RX ADMIN — Medication 1: at 08:11

## 2021-11-12 RX ADMIN — Medication 40 MILLIGRAM(S): at 06:02

## 2021-11-12 RX ADMIN — Medication 4: at 11:51

## 2021-11-12 RX ADMIN — Medication 81 MILLIGRAM(S): at 11:20

## 2021-11-12 RX ADMIN — PREGABALIN 1000 MICROGRAM(S): 225 CAPSULE ORAL at 11:20

## 2021-11-12 RX ADMIN — PANTOPRAZOLE SODIUM 40 MILLIGRAM(S): 20 TABLET, DELAYED RELEASE ORAL at 06:02

## 2021-11-12 RX ADMIN — Medication 1 TABLET(S): at 11:20

## 2021-11-12 NOTE — PROGRESS NOTE ADULT - PROVIDER SPECIALTY LIST ADULT
Nephrology
Cardiology
Critical Care
Hospitalist
Nephrology
Hospitalist
Hospitalist
Endocrinology
Endocrinology

## 2021-11-12 NOTE — PROGRESS NOTE ADULT - ATTENDING COMMENTS
Discussed insulin with Dr. Krueger. Will hold metformin given renal insult. DC home with 70/30 15u BID  Follow up with PCP, Cardio and Endo
Restarting home meds as SCr is getting better. Anticipate D/C home in 24 hrs. Patient with no complaints
Patient is still orthostatic Hypotension given an additional 50NS bolus. No further fluid resuscitation as per cardio. FS noted and holding pre-meal FS for now. A1c 7.6 today. Called PCP- Dr. Todd office to obtain baseline SCr. 2018 show history CKD in [facility] but no lab work. [facility]

## 2021-11-12 NOTE — PROGRESS NOTE ADULT - SUBJECTIVE AND OBJECTIVE BOX
Feels good, denies complaints    Vital Signs Last 24 Hrs  T(C): 36.2 (11-12-21 @ 08:00), Max: 37.1 (11-11-21 @ 19:56)  T(F): 97.1 (11-12-21 @ 08:00), Max: 98.7 (11-11-21 @ 19:56)  HR: 73 (11-12-21 @ 08:11) (62 - 73)  BP: 146/70 (11-12-21 @ 08:00) (133/61 - 158/75)  RR: 16 (11-12-21 @ 08:00) (12 - 17)  SpO2: 97% (11-12-21 @ 08:11) (96% - 100%)    s1s2  b/l air entry  soft  no edema                                12.9   5.47  )-----------( 218      ( 11 Nov 2021 05:30 )             39.8     12 Nov 2021 06:40    138    |  104    |  29     ----------------------------<  151    4.8     |  27     |  1.55     Ca    10.6       12 Nov 2021 06:40    acetaminophen     Tablet .. 650 milliGRAM(s) Oral every 6 hours PRN  amLODIPine   Tablet 10 milliGRAM(s) Oral at bedtime  aspirin enteric coated 81 milliGRAM(s) Oral daily  atorvastatin 80 milliGRAM(s) Oral at bedtime  cyanocobalamin 1000 MICROGram(s) Oral daily  dextrose 40% Gel 15 Gram(s) Oral once  dextrose 5%. 1000 milliLiter(s) IV Continuous <Continuous>  dextrose 5%. 1000 milliLiter(s) IV Continuous <Continuous>  dextrose 50% Injectable 25 Gram(s) IV Push once  dextrose 50% Injectable 12.5 Gram(s) IV Push once  dextrose 50% Injectable 25 Gram(s) IV Push once  furosemide    Tablet 40 milliGRAM(s) Oral daily  gabapentin 300 milliGRAM(s) Oral three times a day  glucagon  Injectable 1 milliGRAM(s) IntraMuscular once  heparin   Injectable 5000 Unit(s) SubCutaneous every 8 hours  insulin glargine Injectable (LANTUS) 10 Unit(s) SubCutaneous every morning  insulin lispro (ADMELOG) corrective regimen sliding scale   SubCutaneous three times a day before meals  insulin lispro (ADMELOG) corrective regimen sliding scale   SubCutaneous at bedtime  lactobacillus acidophilus 1 Tablet(s) Oral daily  pantoprazole    Tablet 40 milliGRAM(s) Oral before breakfast  tamsulosin 0.4 milliGRAM(s) Oral at bedtime  tiotropium 18 MICROgram(s) Capsule 1 Capsule(s) Inhalation daily    A/P:    Hx HTN, DM, CAD, CABG  Hemodynamic DOUGLAS improving, although baseline renal fx is unknown  Would d/c off ACE/ARB, Aldactone given DOUGLAS   Agree w/Lasix  No objection to Metformin  UA negative  Renal SONO w/o hydro  F/u w/PMD, renal, cardiology as op  No NSAID's    [phone]

## 2021-11-12 NOTE — PROGRESS NOTE ADULT - PROBLEM SELECTOR PLAN 1
stable finger sticks   discharge planning is on   recovering Renal Gluconeogenesis and renal function   patient can be discharged on 70/30 mix insulin , 15 units BID   Metformin can be added back about few weeks after normalization of Creatinine and GFR
hypoglycemia secondary to  decreased Renal Gluconeogenesis secondary to ARF  Continue with the current  regimen while inpatient , can be discharged on current dose/ regimen   HbA1C 7.6   for now hold Metformin

## 2021-11-12 NOTE — PROGRESS NOTE ADULT - SUBJECTIVE AND OBJECTIVE BOX
Patient is a 76y old  Male who presents with a chief complaint of dizziness, hypotension, hypoglycemia (2021 23:24)  No acute issues overnight  Eager to go home     Patient seen and examined at bedside.    ALLERGIES:  No Known Allergies    MEDICATIONS  (STANDING):  amLODIPine   Tablet 10 milliGRAM(s) Oral at bedtime  aspirin enteric coated 81 milliGRAM(s) Oral daily  atorvastatin 80 milliGRAM(s) Oral at bedtime  cholecalciferol 400 Unit(s) Oral daily  cyanocobalamin 1000 MICROGram(s) Oral daily  dextrose 40% Gel 15 Gram(s) Oral once  dextrose 5%. 1000 milliLiter(s) (50 mL/Hr) IV Continuous <Continuous>  dextrose 5%. 1000 milliLiter(s) (100 mL/Hr) IV Continuous <Continuous>  dextrose 50% Injectable 25 Gram(s) IV Push once  dextrose 50% Injectable 12.5 Gram(s) IV Push once  dextrose 50% Injectable 25 Gram(s) IV Push once  furosemide    Tablet 40 milliGRAM(s) Oral daily  gabapentin 300 milliGRAM(s) Oral three times a day  glucagon  Injectable 1 milliGRAM(s) IntraMuscular once  heparin   Injectable 5000 Unit(s) SubCutaneous every 8 hours  insulin glargine Injectable (LANTUS) 10 Unit(s) SubCutaneous every morning  insulin lispro (ADMELOG) corrective regimen sliding scale   SubCutaneous three times a day before meals  insulin lispro (ADMELOG) corrective regimen sliding scale   SubCutaneous at bedtime  lactobacillus acidophilus 1 Tablet(s) Oral daily  lisinopril 40 milliGRAM(s) Oral daily  pantoprazole    Tablet 40 milliGRAM(s) Oral before breakfast  tamsulosin 0.4 milliGRAM(s) Oral at bedtime  tiotropium 18 MICROgram(s) Capsule 1 Capsule(s) Inhalation daily    MEDICATIONS  (PRN):  acetaminophen     Tablet .. 650 milliGRAM(s) Oral every 6 hours PRN Temp greater or equal to 38C (100.4F), Mild Pain (1 - 3)    Vital Signs Last 24 Hrs  T(F): 97.1 (2021 08:00), Max: 98.7 (2021 19:56)  HR: 73 (2021 08:11) (62 - 73)  BP: 146/70 (2021 08:00) (133/61 - 158/75)  RR: 16 (2021 08:00) ()  SpO2: 97% (2021 08:11) (96% - 100%)  I&O's Summary    2021 07:01  -  2021 07:00  --------------------------------------------------------  IN: 440 mL / OUT: 242 mL / NET: 198 mL      BMI (kg/m2): 30.7 (21 @ 23:11)  PHYSICAL EXAM:  General: NAD, A/O x 3  ENT: MMM, no thrush  Neck: Supple, No JVD  Lungs: Non labored breathing,  Clear to auscultation bilaterally,   Cardio: RRR, S1/S2, No murmurs, no pitting edema bilaterally  Abdomen: Soft, Nontender, Nondistended; Bowel sounds present  Extremities: No calf tenderness, moves all extremities    LABS:                        12.9   5.47  )-----------( 218      ( 2021 05:30 )             39.8           138  |  104  |  29  ----------------------------<  151  4.8   |  27  |  1.55    Ca    10.6      2021 06:40  Mg     1.9     11-10    TPro  7.2  /  Alb  3.2  /  TBili  0.2  /  DBili  x   /  AST  17  /  ALT  36  /  AlkPhos  74  11-10     eGFR if Non African American: 43 mL/min/1.73M2 (21 @ 06:40)  eGFR if African American: 50 mL/min/1.73M2 (21 @ 06:40)         CARDIAC MARKERS ( 10 Nov 2021 07:10 )  x     / x     / 173 U/L / x     / x                        POCT Blood Glucose.: 177 mg/dL (2021 08:05)  POCT Blood Glucose.: 155 mg/dL (2021 21:22)  POCT Blood Glucose.: 254 mg/dL (2021 17:03)  POCT Blood Glucose.: 238 mg/dL (2021 12:08)      Urinalysis Basic - ( 10 Nov 2021 02:56 )    Color: Yellow / Appearance: Clear / S.015 / pH: x  Gluc: x / Ketone: Negative  / Bili: Negative / Urobili: Negative   Blood: x / Protein: Negative / Nitrite: Negative   Leuk Esterase: Negative / RBC: 0-4 /HPF / WBC 0-2 /HPF   Sq Epi: x / Non Sq Epi: Neg.-Few / Bacteria: Negative /HPF        COVID-19 PCR: NotDetec (21 @ 19:55)      RADIOLOGY & ADDITIONAL TESTS:    Care Discussed with Consultants/Other Providers:

## 2021-11-12 NOTE — PROGRESS NOTE ADULT - SUBJECTIVE AND OBJECTIVE BOX
Patient is a 76y old  Male who presents with a chief complaint of dizziness, hypotension, hypoglycemia (12 Nov 2021 09:07)      Interval History: finger sticks are stable   discharge planning is on     MEDICATIONS  (STANDING):  amLODIPine   Tablet 10 milliGRAM(s) Oral at bedtime  aspirin enteric coated 81 milliGRAM(s) Oral daily  atorvastatin 80 milliGRAM(s) Oral at bedtime  cyanocobalamin 1000 MICROGram(s) Oral daily  dextrose 40% Gel 15 Gram(s) Oral once  dextrose 5%. 1000 milliLiter(s) (50 mL/Hr) IV Continuous <Continuous>  dextrose 5%. 1000 milliLiter(s) (100 mL/Hr) IV Continuous <Continuous>  dextrose 50% Injectable 25 Gram(s) IV Push once  dextrose 50% Injectable 12.5 Gram(s) IV Push once  dextrose 50% Injectable 25 Gram(s) IV Push once  furosemide    Tablet 40 milliGRAM(s) Oral daily  gabapentin 300 milliGRAM(s) Oral three times a day  glucagon  Injectable 1 milliGRAM(s) IntraMuscular once  heparin   Injectable 5000 Unit(s) SubCutaneous every 8 hours  insulin glargine Injectable (LANTUS) 10 Unit(s) SubCutaneous every morning  insulin lispro (ADMELOG) corrective regimen sliding scale   SubCutaneous three times a day before meals  insulin lispro (ADMELOG) corrective regimen sliding scale   SubCutaneous at bedtime  lactobacillus acidophilus 1 Tablet(s) Oral daily  pantoprazole    Tablet 40 milliGRAM(s) Oral before breakfast  tamsulosin 0.4 milliGRAM(s) Oral at bedtime  tiotropium 18 MICROgram(s) Capsule 1 Capsule(s) Inhalation daily    MEDICATIONS  (PRN):  acetaminophen     Tablet .. 650 milliGRAM(s) Oral every 6 hours PRN Temp greater or equal to 38C (100.4F), Mild Pain (1 - 3)      Allergies    No Known Allergies    Intolerances        REVIEW OF SYSTEMS:  CONSTITUTIONAL: no changes  EYES: No eye pain, visual disturbances, or discharge  ENMT:  No difficulty hearing, No sinus or throat pain  NECK: No pain or stiffness  RESPIRATORY: No cough, wheezing, chills or hemoptysis; No shortness of breath  CARDIOVASCULAR: No chest pain, palpitations or leg swelling  GASTROINTESTINAL: No abdominal or epigastric pain. No nausea, vomiting, or hematemesis; No diarrhea or constipation. No melena or hematochezia.  GENITOURINARY: No dysuria, frequency, hematuria, or incontinence  NEUROLOGICAL: No headaches, memory loss, loss of strength, numbness, or tremors  SKIN: No itching, burning, rashes, or lesions   ENDOCRINE: No heat or cold intolerance; No hair loss  MUSCULOSKELETAL: No joint pain or swelling; No muscle, back, or extremity pain  PSYCHIATRIC: No depression, anxiety, mood swings, or difficulty sleeping  HEME/LYMPH: No easy bruising, or bleeding gums  ALLERY AND IMMUNOLOGIC: No hives or eczema    Vital Signs Last 24 Hrs  T(C): 36.2 (12 Nov 2021 08:00), Max: 37.1 (11 Nov 2021 19:56)  T(F): 97.1 (12 Nov 2021 08:00), Max: 98.7 (11 Nov 2021 19:56)  HR: 73 (12 Nov 2021 08:11) (62 - 73)  BP: 146/70 (12 Nov 2021 08:00) (133/61 - 158/75)  BP(mean): --  RR: 16 (12 Nov 2021 08:00) (12 - 17)  SpO2: 97% (12 Nov 2021 08:11) (96% - 100%)    PHYSICAL EXAM:  GENERAL:   HEAD: Atraumatic, Normocephalic  EYES: PERRLA, conjunctiva and sclera clear  ENMT: No  exudates,; Moist mucous membranes,, No lesions  NECK: Supple, No JVD, Normal thyroid  NERVOUS SYSTEM:  Alert & Oriented,   CHEST/LUNG: Clear to auscultation bilaterally; No rales, rhonchi, wheezing, or rubs  HEART: Regular rate and rhythm; No murmurs, rubs, or gallops  ABDOMEN: Soft, Nontender, Nondistended; Bowel sounds present  EXTREMITIES:  2+ Peripheral Pulses, no edema  SKIN: No rashes or lesions    LABS:        CAPILLARY BLOOD GLUCOSE      POCT Blood Glucose.: 348 mg/dL (12 Nov 2021 11:49)  POCT Blood Glucose.: 177 mg/dL (12 Nov 2021 08:05)  POCT Blood Glucose.: 155 mg/dL (11 Nov 2021 21:22)  POCT Blood Glucose.: 254 mg/dL (11 Nov 2021 17:03)    Lipid panel:           Thyroid:  Diabetes Tests:  Parathyroid Panel:  Adrenals:  RADIOLOGY & ADDITIONAL TESTS:    Imaging Personally Reviewed:  [ ] YES  [ ] NO    Consultant(s) Notes Reviewed:  [ ] YES  [ ] NO    Care Discussed with Consultants/Other Providers [ ] YES  [ ] NO

## 2021-11-12 NOTE — PROGRESS NOTE ADULT - ASSESSMENT
76M hx of HTN, HLD, CAD/1vl CABG, CHF, CKD, BPH, PAD, T2DM on insulin, GERD, bladder cancer pw dizziness with hypotension and hypoglycemia    #Hypotension likely sec to overdiuresis.   - Antihypertensives and diuretics have been on home (home meds: amlodipine 10mg, lasix 40mg, lisinopril 40mg, spironolactone 25mg)-->restarted Norvasc 11/11 and lisinopril today  - BP stable     #DOUGLAS on CKD likely sec to above.  - Holding diuretics, ACE, spironolactone -->restarted lasix today 11/11  - PVRs low  - Renal consult appreciated  - Renal sono with no acute findings.   - Cr improved from 2.86 --> 2.26-->1.6--> 1.5. Patient is unsure of baseline but believes it is ~1.4--[facility]     #CAD/CHFpEF  - Restarted laisx 40 mg daily on 11/11  - TTE with EF 55-60%, nml LV function  - Trops negative x2  - Cardiology consult appreciated  - Of note, patient had a CT coronary outpatient earlier this week and he reports he may need a stent   - Cont ASA/statin    #T2DM on insulin with hypoglycemia  - Home insulin: humulin 70/30, 60 units in AM, 25 units in PM  - Hypoglycemia resolved  - Continue lantus 10 units q morning, accucheck and sliding scale insulin for now  - Encourage po intake  - Endocrinology consulted.: plan to dc home on humulin 70/30: 15 iu BID and hold Metformin   - Patient reports recent diet changes, he has been self-adjusting his insulin dosing based on his blood glucose levels at home     #HLD   - Cont statin    #BPH  - Continue flomax     #DVT/GI proph:  Heparin   - PPI    Dispo: suspect home today. [facility]   Patient states he will update his family     *Case d/w Dr Krueger  76M hx of HTN, HLD, CAD/1vl CABG, CHF, CKD, BPH, PAD, T2DM on insulin, GERD, bladder cancer pw dizziness with hypotension and hypoglycemia    #Hypotension likely sec to overdiuresis.   - Antihypertensives and diuretics have been on home (home meds: amlodipine 10mg, lasix 40mg, lisinopril 40mg, spironolactone 25mg)-->restarted Norvasc 11/11 . Cont to hold ACE until seen by cardiology    #DOUGLAS on CKD likely sec to above.  - Holding diuretics, ACE, spironolactone -->restarted lasix today 11/11  - PVRs low  - Renal consult appreciated  - Renal sono with no acute findings.   - Cr improved from 2.86 --> 2.26-->1.6--> 1.5. Patient is unsure of baseline but believes it is ~1.4--[facility]able [facility]     #CAD/CHFpEF  - Restarted laisx 40 mg daily on 11/11  - TTE with EF 55-60%, nml LV function  - Trops negative x2  - Cardiology consult appreciated  - Of note, patient had a CT coronary outpatient earlier this week and he reports he may need a stent   - Cont ASA/statin    #T2DM on insulin with hypoglycemia  - Home insulin: humulin 70/30, 60 units in AM, 25 units in PM  - Hypoglycemia resolved  - Continue lantus 10 units q morning, accucheck and sliding scale insulin for now  - Encourage po intake  - Endocrinology consulted.: plan to dc home on humulin 70/30: 15 iu BID and hold Metformin   - Patient reports recent diet changes, he has been self-adjusting his insulin dosing based on his blood glucose levels at home     #HLD   - Cont statin    #BPH  - Continue flomax     #DVT/GI proph:  Heparin   - PPI    Dispo: suspect home today. [facility] . Has cardio appt next week   Patient states he will update his family     *Case d/w Dr Krueger

## 2021-11-12 NOTE — PROGRESS NOTE ADULT - REASON FOR ADMISSION
dizziness, hypotension, hypoglycemia

## 2021-11-12 NOTE — DISCHARGE NOTE NURSING/CASE MANAGEMENT/SOCIAL WORK - PATIENT PORTAL LINK FT
You can access the FollowMyHealth Patient Portal offered by [facility] by registering at the following website: [email or web address]. By joining [facility]’s FollowMyHealth portal, you will also be able to view your health information using other applications (apps) compatible with our system.

## 2021-11-12 NOTE — DISCHARGE NOTE NURSING/CASE MANAGEMENT/SOCIAL WORK - NSDCFUADDAPPT_GEN_ALL_CORE_FT
Follow up appointment with Dr. Ruma Costa [phone] on 11/23/21 at 10am    [facility] or you can follow up with endocrinology at [facility]: Dr Krueger

## 2022-01-24 ENCOUNTER — INPATIENT (INPATIENT)
Facility: HOSPITAL | Age: 77
LOS: 1 days | Discharge: HOME | DRG: 313 | End: 2022-01-26
Attending: HOSPITALIST | Admitting: STUDENT IN AN ORGANIZED HEALTH CARE EDUCATION/TRAINING PROGRAM
Payer: MEDICARE

## 2022-01-24 VITALS
WEIGHT: 223.11 LBS | SYSTOLIC BLOOD PRESSURE: 202 MMHG | RESPIRATION RATE: 18 BRPM | DIASTOLIC BLOOD PRESSURE: 73 MMHG | TEMPERATURE: 97 F | OXYGEN SATURATION: 97 % | HEIGHT: 71 IN | HEART RATE: 76 BPM

## 2022-01-24 DIAGNOSIS — Z98.890 OTHER SPECIFIED POSTPROCEDURAL STATES: Chronic | ICD-10-CM

## 2022-01-24 DIAGNOSIS — Z90.49 ACQUIRED ABSENCE OF OTHER SPECIFIED PARTS OF DIGESTIVE TRACT: Chronic | ICD-10-CM

## 2022-01-24 DIAGNOSIS — Z95.1 PRESENCE OF AORTOCORONARY BYPASS GRAFT: Chronic | ICD-10-CM

## 2022-01-24 DIAGNOSIS — R07.9 CHEST PAIN, UNSPECIFIED: ICD-10-CM

## 2022-01-24 PROBLEM — I50.9 HEART FAILURE, UNSPECIFIED: Chronic | Status: ACTIVE | Noted: 2021-11-09

## 2022-01-24 PROBLEM — N18.9 CHRONIC KIDNEY DISEASE, UNSPECIFIED: Chronic | Status: ACTIVE | Noted: 2021-11-09

## 2022-01-24 PROBLEM — E78.5 HYPERLIPIDEMIA, UNSPECIFIED: Chronic | Status: ACTIVE | Noted: 2021-11-09

## 2022-01-24 PROBLEM — E11.9 TYPE 2 DIABETES MELLITUS WITHOUT COMPLICATIONS: Chronic | Status: ACTIVE | Noted: 2021-11-09

## 2022-01-24 PROBLEM — I25.10 ATHEROSCLEROTIC HEART DISEASE OF NATIVE CORONARY ARTERY WITHOUT ANGINA PECTORIS: Chronic | Status: ACTIVE | Noted: 2021-11-09

## 2022-01-24 PROBLEM — I10 ESSENTIAL (PRIMARY) HYPERTENSION: Chronic | Status: ACTIVE | Noted: 2021-11-09

## 2022-01-24 LAB
ALBUMIN SERPL ELPH-MCNC: 3.7 G/DL — SIGNIFICANT CHANGE UP (ref 3.3–5)
ALP SERPL-CCNC: 87 U/L — SIGNIFICANT CHANGE UP (ref 40–120)
ALT FLD-CCNC: 28 U/L — SIGNIFICANT CHANGE UP (ref 10–45)
ANION GAP SERPL CALC-SCNC: 10 MMOL/L — SIGNIFICANT CHANGE UP (ref 5–17)
AST SERPL-CCNC: 20 U/L — SIGNIFICANT CHANGE UP (ref 10–40)
BASOPHILS # BLD AUTO: 0.05 K/UL — SIGNIFICANT CHANGE UP (ref 0–0.2)
BASOPHILS NFR BLD AUTO: 0.6 % — SIGNIFICANT CHANGE UP (ref 0–2)
BILIRUB SERPL-MCNC: 0.3 MG/DL — SIGNIFICANT CHANGE UP (ref 0.2–1.2)
BUN SERPL-MCNC: 21 MG/DL — SIGNIFICANT CHANGE UP (ref 7–23)
CALCIUM SERPL-MCNC: 10 MG/DL — SIGNIFICANT CHANGE UP (ref 8.4–10.5)
CHLORIDE SERPL-SCNC: 108 MMOL/L — SIGNIFICANT CHANGE UP (ref 96–108)
CO2 SERPL-SCNC: 26 MMOL/L — SIGNIFICANT CHANGE UP (ref 22–31)
CREAT SERPL-MCNC: 1.52 MG/DL — HIGH (ref 0.5–1.3)
EOSINOPHIL # BLD AUTO: 0.18 K/UL — SIGNIFICANT CHANGE UP (ref 0–0.5)
EOSINOPHIL NFR BLD AUTO: 2.1 % — SIGNIFICANT CHANGE UP (ref 0–6)
GLUCOSE BLDC GLUCOMTR-MCNC: 170 MG/DL — HIGH (ref 70–99)
GLUCOSE SERPL-MCNC: 140 MG/DL — HIGH (ref 70–99)
HCT VFR BLD CALC: 40.4 % — SIGNIFICANT CHANGE UP (ref 39–50)
HGB BLD-MCNC: 13.1 G/DL — SIGNIFICANT CHANGE UP (ref 13–17)
IMM GRANULOCYTES NFR BLD AUTO: 0.3 % — SIGNIFICANT CHANGE UP (ref 0–1.5)
LYMPHOCYTES # BLD AUTO: 1.8 K/UL — SIGNIFICANT CHANGE UP (ref 1–3.3)
LYMPHOCYTES # BLD AUTO: 20.7 % — SIGNIFICANT CHANGE UP (ref 13–44)
MCHC RBC-ENTMCNC: 29.6 PG — SIGNIFICANT CHANGE UP (ref 27–34)
MCHC RBC-ENTMCNC: 32.4 GM/DL — SIGNIFICANT CHANGE UP (ref 32–36)
MCV RBC AUTO: 91.2 FL — SIGNIFICANT CHANGE UP (ref 80–100)
MONOCYTES # BLD AUTO: 0.79 K/UL — SIGNIFICANT CHANGE UP (ref 0–0.9)
MONOCYTES NFR BLD AUTO: 9.1 % — SIGNIFICANT CHANGE UP (ref 2–14)
NEUTROPHILS # BLD AUTO: 5.86 K/UL — SIGNIFICANT CHANGE UP (ref 1.8–7.4)
NEUTROPHILS NFR BLD AUTO: 67.2 % — SIGNIFICANT CHANGE UP (ref 43–77)
NRBC # BLD: 0 /100 WBCS — SIGNIFICANT CHANGE UP (ref 0–0)
PLATELET # BLD AUTO: 230 K/UL — SIGNIFICANT CHANGE UP (ref 150–400)
POTASSIUM SERPL-MCNC: 4.1 MMOL/L — SIGNIFICANT CHANGE UP (ref 3.5–5.3)
POTASSIUM SERPL-SCNC: 4.1 MMOL/L — SIGNIFICANT CHANGE UP (ref 3.5–5.3)
PROT SERPL-MCNC: 7.9 G/DL — SIGNIFICANT CHANGE UP (ref 6–8.3)
RBC # BLD: 4.43 M/UL — SIGNIFICANT CHANGE UP (ref 4.2–5.8)
RBC # FLD: 14.2 % — SIGNIFICANT CHANGE UP (ref 10.3–14.5)
SARS-COV-2 RNA SPEC QL NAA+PROBE: SIGNIFICANT CHANGE UP
SODIUM SERPL-SCNC: 144 MMOL/L — SIGNIFICANT CHANGE UP (ref 135–145)
TROPONIN I, HIGH SENSITIVITY RESULT: 25 NG/L — SIGNIFICANT CHANGE UP
WBC # BLD: 8.71 K/UL — SIGNIFICANT CHANGE UP (ref 3.8–10.5)
WBC # FLD AUTO: 8.71 K/UL — SIGNIFICANT CHANGE UP (ref 3.8–10.5)

## 2022-01-24 PROCEDURE — 99222 1ST HOSP IP/OBS MODERATE 55: CPT

## 2022-01-24 PROCEDURE — 99285 EMERGENCY DEPT VISIT HI MDM: CPT

## 2022-01-24 PROCEDURE — 93010 ELECTROCARDIOGRAM REPORT: CPT

## 2022-01-24 RX ORDER — GABAPENTIN 400 MG/1
600 CAPSULE ORAL THREE TIMES A DAY
Refills: 0 | Status: DISCONTINUED | OUTPATIENT
Start: 2022-01-24 | End: 2022-01-26

## 2022-01-24 RX ORDER — TAMSULOSIN HYDROCHLORIDE 0.4 MG/1
0.4 CAPSULE ORAL AT BEDTIME
Refills: 0 | Status: DISCONTINUED | OUTPATIENT
Start: 2022-01-24 | End: 2022-01-26

## 2022-01-24 RX ORDER — PANTOPRAZOLE SODIUM 20 MG/1
40 TABLET, DELAYED RELEASE ORAL
Refills: 0 | Status: DISCONTINUED | OUTPATIENT
Start: 2022-01-24 | End: 2022-01-26

## 2022-01-24 RX ORDER — LANOLIN ALCOHOL/MO/W.PET/CERES
3 CREAM (GRAM) TOPICAL AT BEDTIME
Refills: 0 | Status: DISCONTINUED | OUTPATIENT
Start: 2022-01-24 | End: 2022-01-26

## 2022-01-24 RX ORDER — ASPIRIN/CALCIUM CARB/MAGNESIUM 324 MG
162 TABLET ORAL ONCE
Refills: 0 | Status: COMPLETED | OUTPATIENT
Start: 2022-01-24 | End: 2022-01-24

## 2022-01-24 RX ORDER — ACETAMINOPHEN 500 MG
650 TABLET ORAL EVERY 6 HOURS
Refills: 0 | Status: DISCONTINUED | OUTPATIENT
Start: 2022-01-24 | End: 2022-01-26

## 2022-01-24 RX ORDER — TIOTROPIUM BROMIDE 18 UG/1
1 CAPSULE ORAL; RESPIRATORY (INHALATION) DAILY
Refills: 0 | Status: DISCONTINUED | OUTPATIENT
Start: 2022-01-24 | End: 2022-01-26

## 2022-01-24 RX ORDER — SPIRONOLACTONE 25 MG/1
25 TABLET, FILM COATED ORAL DAILY
Refills: 0 | Status: DISCONTINUED | OUTPATIENT
Start: 2022-01-25 | End: 2022-01-26

## 2022-01-24 RX ORDER — ONDANSETRON 8 MG/1
4 TABLET, FILM COATED ORAL EVERY 8 HOURS
Refills: 0 | Status: DISCONTINUED | OUTPATIENT
Start: 2022-01-24 | End: 2022-01-26

## 2022-01-24 RX ORDER — GLUCAGON INJECTION, SOLUTION 0.5 MG/.1ML
1 INJECTION, SOLUTION SUBCUTANEOUS ONCE
Refills: 0 | Status: DISCONTINUED | OUTPATIENT
Start: 2022-01-24 | End: 2022-01-26

## 2022-01-24 RX ORDER — CHOLECALCIFEROL (VITAMIN D3) 125 MCG
400 CAPSULE ORAL DAILY
Refills: 0 | Status: DISCONTINUED | OUTPATIENT
Start: 2022-01-25 | End: 2022-01-26

## 2022-01-24 RX ORDER — AMLODIPINE BESYLATE 2.5 MG/1
10 TABLET ORAL DAILY
Refills: 0 | Status: DISCONTINUED | OUTPATIENT
Start: 2022-01-25 | End: 2022-01-26

## 2022-01-24 RX ORDER — DEXTROSE 50 % IN WATER 50 %
15 SYRINGE (ML) INTRAVENOUS ONCE
Refills: 0 | Status: DISCONTINUED | OUTPATIENT
Start: 2022-01-24 | End: 2022-01-26

## 2022-01-24 RX ORDER — SODIUM CHLORIDE 9 MG/ML
1000 INJECTION, SOLUTION INTRAVENOUS
Refills: 0 | Status: DISCONTINUED | OUTPATIENT
Start: 2022-01-24 | End: 2022-01-26

## 2022-01-24 RX ORDER — LACTOBACILLUS ACIDOPHILUS 100MM CELL
1 CAPSULE ORAL DAILY
Refills: 0 | Status: DISCONTINUED | OUTPATIENT
Start: 2022-01-25 | End: 2022-01-26

## 2022-01-24 RX ORDER — FUROSEMIDE 40 MG
40 TABLET ORAL DAILY
Refills: 0 | Status: DISCONTINUED | OUTPATIENT
Start: 2022-01-25 | End: 2022-01-26

## 2022-01-24 RX ORDER — INSULIN LISPRO 100/ML
VIAL (ML) SUBCUTANEOUS
Refills: 0 | Status: DISCONTINUED | OUTPATIENT
Start: 2022-01-24 | End: 2022-01-26

## 2022-01-24 RX ORDER — PREGABALIN 225 MG/1
1000 CAPSULE ORAL DAILY
Refills: 0 | Status: DISCONTINUED | OUTPATIENT
Start: 2022-01-25 | End: 2022-01-26

## 2022-01-24 RX ORDER — DEXTROSE 50 % IN WATER 50 %
25 SYRINGE (ML) INTRAVENOUS ONCE
Refills: 0 | Status: DISCONTINUED | OUTPATIENT
Start: 2022-01-24 | End: 2022-01-26

## 2022-01-24 RX ORDER — ASPIRIN/CALCIUM CARB/MAGNESIUM 324 MG
81 TABLET ORAL DAILY
Refills: 0 | Status: DISCONTINUED | OUTPATIENT
Start: 2022-01-24 | End: 2022-01-26

## 2022-01-24 RX ORDER — INSULIN GLARGINE 100 [IU]/ML
10 INJECTION, SOLUTION SUBCUTANEOUS AT BEDTIME
Refills: 0 | Status: DISCONTINUED | OUTPATIENT
Start: 2022-01-24 | End: 2022-01-26

## 2022-01-24 RX ORDER — HEPARIN SODIUM 5000 [USP'U]/ML
5000 INJECTION INTRAVENOUS; SUBCUTANEOUS EVERY 12 HOURS
Refills: 0 | Status: DISCONTINUED | OUTPATIENT
Start: 2022-01-24 | End: 2022-01-26

## 2022-01-24 RX ORDER — ATORVASTATIN CALCIUM 80 MG/1
80 TABLET, FILM COATED ORAL AT BEDTIME
Refills: 0 | Status: DISCONTINUED | OUTPATIENT
Start: 2022-01-24 | End: 2022-01-26

## 2022-01-24 RX ADMIN — TAMSULOSIN HYDROCHLORIDE 0.4 MILLIGRAM(S): 0.4 CAPSULE ORAL at 21:36

## 2022-01-24 RX ADMIN — INSULIN GLARGINE 10 UNIT(S): 100 INJECTION, SOLUTION SUBCUTANEOUS at 21:36

## 2022-01-24 RX ADMIN — Medication 162 MILLIGRAM(S): at 16:05

## 2022-01-24 RX ADMIN — Medication 2: at 21:37

## 2022-01-24 RX ADMIN — Medication 81 MILLIGRAM(S): at 18:38

## 2022-01-24 RX ADMIN — GABAPENTIN 600 MILLIGRAM(S): 400 CAPSULE ORAL at 21:36

## 2022-01-24 NOTE — ED ADULT NURSE NOTE - OBJECTIVE STATEMENT
77 yr old male A&Ox4 for evaluation of shortness of breath and left sided chest pain, non radiating. Pt states "it feels like a pinch on my chest". PMHX: COPD, HTN, DM, Cardiac bypass 2005

## 2022-01-24 NOTE — CONSULT NOTE ADULT - SUBJECTIVE AND OBJECTIVE BOX
JAYA BENSON  [id number]      HPI:    Jaya Benson is a 76 year old man, follows with [facility] with past medical history of Coronary artery disease (s/p CABG), Peripheral arterial disease, Hypertension, Hyperlipidemia, Type II Diabetes mellitus and CKD      ALLERGIES:  No Known Allergies      PAST MEDICAL & SURGICAL HISTORY:  HTN (hypertension)    HLD (hyperlipidemia)    Diabetes mellitus, type 2    CAD (coronary artery disease)    Chronic CHF    CKD (chronic kidney disease)    S/P CABG x 1    History of appendectomy    History of cholecystectomy    H/O abdominal surgery  for &#x27;stomach cancer&#x27;          CURRENT MEDICATIONS:  acetaminophen     Tablet .. 650 milliGRAM(s) Oral every 6 hours PRN  aluminum hydroxide/magnesium hydroxide/simethicone Suspension 30 milliLiter(s) Oral every 4 hours PRN  aspirin  chewable 81 milliGRAM(s) Oral daily  atorvastatin 80 milliGRAM(s) Oral at bedtime  dextrose 40% Gel 15 Gram(s) Oral once  dextrose 5%. 1000 milliLiter(s) IV Continuous <Continuous>  dextrose 50% Injectable 25 Gram(s) IV Push once  gabapentin 600 milliGRAM(s) Oral three times a day  glucagon  Injectable 1 milliGRAM(s) IntraMuscular once  heparin   Injectable 5000 Unit(s) SubCutaneous every 12 hours  insulin glargine Injectable (LANTUS) 10 Unit(s) SubCutaneous at bedtime  insulin lispro (ADMELOG) corrective regimen sliding scale   SubCutaneous three times a day before meals  melatonin 3 milliGRAM(s) Oral at bedtime PRN  ondansetron Injectable 4 milliGRAM(s) IV Push every 8 hours PRN  pantoprazole    Tablet 40 milliGRAM(s) Oral before breakfast  tamsulosin 0.4 milliGRAM(s) Oral at bedtime  tiotropium 18 MICROgram(s) Capsule 1 Capsule(s) Inhalation daily      SOCIAL HISTORY:      FAMILY HISTORY:  FH: type 2 diabetes (Mother, Sibling)    FHx: kidney failure (Sibling)        ROS:  All 10 systems reviewed and positives noted in HPI    OBJECTIVE:    VITAL SIGNS:  Vital Signs Last 24 Hrs  T(C): 36.1 (24 Jan 2022 14:47), Max: 36.1 (24 Jan 2022 14:47)  T(F): 97 (24 Jan 2022 14:47), Max: 97 (24 Jan 2022 14:47)  HR: 69 (24 Jan 2022 14:51) (69 - 76)  BP: 148/63 (24 Jan 2022 14:51) (148/63 - 202/73)  BP(mean): 85 (24 Jan 2022 14:51) (85 - 85)  RR: 19 (24 Jan 2022 14:51) (18 - 19)  SpO2: 100% (24 Jan 2022 14:51) (97% - 100%)    PHYSICAL EXAM:  General: elderly man, obese, no distress  HEENT: sclera anicteric  Neck: supple, no carotid bruits b/l  CVS: JVP ~ 7 cm H20, RRR, s1, s2, no murmurs/rubs  Chest: unlabored respirations, clear to auscultation b/l  Abdomen: non-distended  Extremities: no lower extremity edema b/l  Neuro: awake, alert & oriented x 3  Psych: normal affect        LABS:                        13.1   8.71  )-----------( 230      ( 24 Jan 2022 15:00 )             40.4     01-24    144  |  108  |  21  ----------------------------<  140<H>  4.1   |  26  |  1.52<H>    Ca    10.0      24 Jan 2022 15:00    TPro  7.9  /  Alb  3.7  /  TBili  0.3  /  DBili  x   /  AST  20  /  ALT  28  /  AlkPhos  87  01-24              ECG (1/24/22): sinus rhythm, nonspecific     TTE (11/2021):   1. Technically difficult study with poor endocardial visualization.   2. Normal global left ventricular systolic function.   3. Left ventricular ejection fraction, by visual estimation, is 55 to 60%.   4. Normal left ventricular internal cavity size.   5. Mildly increased septal wall thickness.   6. The right ventricle is not well visualized, appears to be normal in size in subcostal view.   7. The left atrium is normal in size.   8. The right atrium is normal in size.   9. Mild thickening and calcification of the anterior mitral valve leaflet.  10. Mild mitral valve regurgitation.  11. Mild tricuspid regurgitation.  12. The aortic valve is not well visualized, appears to have calcification with decreased aortic opening.  13. Trivial pericardial effusion.  14. Recommend clinical correlation with the above findings. JAYA BENSON  [id number]      HPI:    Jaya Benson is a 76 year old man, follows with [facility] Cardiology with past medical history of Coronary artery disease (s/p CABG in setting of cardiac arrest), Peripheral arterial disease, Hypertension, Hyperlipidemia, Type II Diabetes mellitus and CKD who presents with chest pain.    He reports that on [facility] way [facility] he started to have sharp chest pain located under his left breast. Reports that [facility] told by [facility] to go to [facility] for further evaluation. Denies any worsening of the chest pain on exertion. Reports increasing shortness of breath on exertion lately. Denies leg edema, reports compliance with lasix 40 mg daily. Denies any increased sodium intake. Denies palpitations, presyncope or syncope.       ALLERGIES:  No Known Allergies      PAST MEDICAL & SURGICAL HISTORY:  Coronary artery disease (s/p CABG in setting of cardiac arrest) Peripheral arterial disease  Hypertension  Hyperlipidemia  Type II Diabetes mellitus  CKD  History of appendectomy  History of cholecystectomy  H/O abdominal surgery      CURRENT MEDICATIONS:  acetaminophen     Tablet .. 650 milliGRAM(s) Oral every 6 hours PRN  aluminum hydroxide/magnesium hydroxide/simethicone Suspension 30 milliLiter(s) Oral every 4 hours PRN  aspirin  chewable 81 milliGRAM(s) Oral daily  atorvastatin 80 milliGRAM(s) Oral at bedtime  dextrose 40% Gel 15 Gram(s) Oral once  dextrose 5%. 1000 milliLiter(s) IV Continuous <Continuous>  dextrose 50% Injectable 25 Gram(s) IV Push once  gabapentin 600 milliGRAM(s) Oral three times a day  glucagon  Injectable 1 milliGRAM(s) IntraMuscular once  heparin   Injectable 5000 Unit(s) SubCutaneous every 12 hours  insulin glargine Injectable (LANTUS) 10 Unit(s) SubCutaneous at bedtime  insulin lispro (ADMELOG) corrective regimen sliding scale   SubCutaneous three times a day before meals  melatonin 3 milliGRAM(s) Oral at bedtime PRN  ondansetron Injectable 4 milliGRAM(s) IV Push every 8 hours PRN  pantoprazole    Tablet 40 milliGRAM(s) Oral before breakfast  tamsulosin 0.4 milliGRAM(s) Oral at bedtime  tiotropium 18 MICROgram(s) Capsule 1 Capsule(s) Inhalation daily      FAMILY HISTORY:  FH: type 2 diabetes (Mother, Sibling)  FHx: kidney failure (Sibling)        ROS:  All 10 systems reviewed and positives noted in HPI    OBJECTIVE:    VITAL SIGNS:  Vital Signs Last 24 Hrs  T(C): 36.1 (24 Jan 2022 14:47), Max: 36.1 (24 Jan 2022 14:47)  T(F): 97 (24 Jan 2022 14:47), Max: 97 (24 Jan 2022 14:47)  HR: 69 (24 Jan 2022 14:51) (69 - 76)  BP: 148/63 (24 Jan 2022 14:51) (148/63 - 202/73)  BP(mean): 85 (24 Jan 2022 14:51) (85 - 85)  RR: 19 (24 Jan 2022 14:51) (18 - 19)  SpO2: 100% (24 Jan 2022 14:51) (97% - 100%)    PHYSICAL EXAM:  General: elderly man, obese, no distress  HEENT: sclera anicteric  Neck: supple, no carotid bruits b/l  CVS: JVP ~ 7 cm H20, RRR, s1, s2, no murmurs/rubs  Chest: unlabored respirations, clear to auscultation b/l  Abdomen: non-distended  Extremities: no lower extremity edema b/l  Neuro: awake, alert & oriented x 3  Psych: normal affect        LABS:                        13.1   8.71  )-----------( 230      ( 24 Jan 2022 15:00 )             40.4     01-24    144  |  108  |  21  ----------------------------<  140<H>  4.1   |  26  |  1.52<H>    Ca    10.0      24 Jan 2022 15:00    TPro  7.9  /  Alb  3.7  /  TBili  0.3  /  DBili  x   /  AST  20  /  ALT  28  /  AlkPhos  87  01-24          ECG (1/24/22): sinus rhythm, nonspecific     TTE (11/2021):   1. Technically difficult study with poor endocardial visualization.   2. Normal global left ventricular systolic function.   3. Left ventricular ejection fraction, by visual estimation, is 55 to 60%.   4. Normal left ventricular internal cavity size.   5. Mildly increased septal wall thickness.   6. The right ventricle is not well visualized, appears to be normal in size in subcostal view.   7. The left atrium is normal in size.   8. The right atrium is normal in size.   9. Mild thickening and calcification of the anterior mitral valve leaflet.  10. Mild mitral valve regurgitation.  11. Mild tricuspid regurgitation.  12. The aortic valve is not well visualized, appears to have calcification with decreased aortic opening.  13. Trivial pericardial effusion.  14. Recommend clinical correlation with the above findings.

## 2022-01-24 NOTE — H&P ADULT - NSHPPHYSICALEXAM_GEN_ALL_CORE
T(C): 36.1 (01-24-22 @ 14:47), Max: 36.1 (01-24-22 @ 14:47)  HR: 69 (01-24-22 @ 14:51) (69 - 76)  BP: 148/63 (01-24-22 @ 14:51) (148/63 - 202/73)  RR: 19 (01-24-22 @ 14:51) (18 - 19)  SpO2: 100% (01-24-22 @ 14:51) (97% - 100%)  Wt(kg): --Vital Signs Last 24 Hrs  T(C): 36.1 (24 Jan 2022 14:47), Max: 36.1 (24 Jan 2022 14:47)  T(F): 97 (24 Jan 2022 14:47), Max: 97 (24 Jan 2022 14:47)  HR: 69 (24 Jan 2022 14:51) (69 - 76)  BP: 148/63 (24 Jan 2022 14:51) (148/63 - 202/73)  BP(mean): 85 (24 Jan 2022 14:51) (85 - 85)  RR: 19 (24 Jan 2022 14:51) (18 - 19)  SpO2: 100% (24 Jan 2022 14:51) (97% - 100%)    PHYSICAL EXAM:  GENERAL: NAD, well-groomed, well-developed  HEAD:  Atraumatic, Normocephalic  EYES: EOMI, PERRLA, conjunctiva and sclera clear  ENMT: No tonsillar erythema, exudates, or enlargement; Moist mucous membranes, Good dentition, No lesions  NECK: Supple, No JVD, Normal thyroid  NERVOUS SYSTEM:  Alert & Oriented X3, Good concentration; Motor Strength 5/5 B/L upper and lower extremities; DTRs 2+ intact and symmetric  CHEST/LUNG: Clear to percussion bilaterally; No rales, rhonchi, wheezing, or rubs  HEART: Regular rate and rhythm; No murmurs, rubs, or gallops  ABDOMEN: Soft, Nontender, Nondistended; Bowel sounds present  EXTREMITIES:  2+ Peripheral Pulses, No clubbing, cyanosis, or edema  LYMPH: No lymphadenopathy noted  SKIN: No rashes or lesions

## 2022-01-24 NOTE — H&P ADULT - ASSESSMENT
77M hx of HTN, HLD, CAD/1vl CABG, CHF, CKD, BPH, PAD, T2DM on insulin, GERD, bladder cancer pw chest pain.      #Chest Pain  - EKG showing  - Trop initially 25 -- repeat pending  - admit to telemetry  - cardiology consulted  - echo ordered  - will likely need further ischemic workup  - home meds: amlodipine 10mg, lasix 40mg, lisinopril 40mg, spironolactone 25mg    #CAD/CHFpEF  #HTN  #HLD  - Recent TTE with EF 55-60%, nml LV function  - Cont ASA/statin  - continue antihypertensives and lasix for diuresis    #T2DM on insulin with hypoglycemia  - Home insulin: humulin 70/30, 60 units in AM, 25 units in PM  - Continue lantus 10 units q morning, accucheck and sliding scale insulin for now  - Encourage po intake  - on last admission, Endocrinology consulted.: plan to dc home on humulin 70/30: 15 iu BID and hold Metformin     #CKD stage II  - Cr at baseline  - monitor renal function    #BPH  - Continue flomax     #GERD  - PPI    #DVT ppx  - HSQ  - IPC 77M hx of HTN, HLD, CAD/1vl CABG, CHF, CKD, BPH, PAD, T2DM on insulin, GERD, bladder cancer pw chest pain.      #Chest Pain  - EKG showing NSR, personally reviewed  - Trop initially 25 -- repeat pending  -   - admit to telemetry  - cardiology consulted - message left with answering service  - echo ordered  - will likely need further ischemic workup  - home meds: amlodipine 10mg, lasix 40mg, lisinopril 40mg, spironolactone 25mg    #CAD/CHFpEF  #HTN  #HLD  - Recent TTE with EF 55-60%, nml LV function  - Cont ASA/statin  - continue antihypertensives and lasix for diuresis, spironolactone 25mg daily    #T2DM on insulin with hypoglycemia  - Home insulin: humulin 70/30, 60 units in AM, 25 units in PM  - Continue lantus 10 units qhs, ISS, accucheks  - Encourage po intake  - on last admission, Endocrinology consulted.: plan to dc home on humulin 70/30: 15 iu BID and hold Metformin     #CKD stage III  - Cr at baseline  - monitor renal function    #BPH  - Continue flomax     #GERD  - PPI    #DVT ppx  - HSQ  - IPC

## 2022-01-24 NOTE — H&P ADULT - NSHPLABSRESULTS_GEN_ALL_CORE
LABS:                        13.1   8.71  )-----------( 230      ( 24 Jan 2022 15:00 )             40.4     01-24    144  |  108  |  21  ----------------------------<  140<H>  4.1   |  26  |  1.52<H>    Ca    10.0      24 Jan 2022 15:00    TPro  7.9  /  Alb  3.7  /  TBili  0.3  /  DBili  x   /  AST  20  /  ALT  28  /  AlkPhos  87  01-24         CAPILLARY BLOOD GLUCOSE                RADIOLOGY & ADDITIONAL TESTS:    Consultant(s) Notes Reviewed:  [x ] YES  [ ] NO  Care Discussed with Consultants/Other Providers [ x] YES  [ ] NO  Imaging Personally Reviewed:  [ ] YES  [ ] NO

## 2022-01-24 NOTE — H&P ADULT - HISTORY OF PRESENT ILLNESS
77 yr old male with hx of CABG, DM, HTN, HLD, CAD, CHF presents with shortness of breath on exertion and movement for the last 4 days. Pt reports developing left sided chest pain, non radiating, sharp pain since 1045 am. Denies any fever, chills, n/v/d, urinary complaints or any other symptoms. 77 yr old male with hx of CABG, DM, HTN, HLD, CAD, CHF presents with shortness of breath on exertion and movement for the last 4 days. Pt reports developing left sided chest pain, non radiating, sharp pain since 1045 am. No exacerbating or alleviating factors. Denies any fever, chills, n/v/d, urinary complaints or any other symptoms.

## 2022-01-24 NOTE — ED ADULT NURSE NOTE - NSIMPLEMENTINTERV_GEN_ALL_ED
Implemented All Fall with Harm Risk Interventions:  [place] to call system. Call bell, personal items and telephone within reach. Instruct patient to call for assistance. Room bathroom lighting operational. Non-slip footwear when patient is off stretcher. Physically safe environment: no spills, clutter or unnecessary equipment. Stretcher in lowest position, wheels locked, appropriate side rails in place. Provide visual cue, wrist band, yellow gown, etc. Monitor gait and stability. Monitor for mental status changes and reorient to person, place, and time. Review medications for side effects contributing to fall risk. Reinforce activity limits and safety measures with patient and family. Provide visual clues: red socks.

## 2022-01-24 NOTE — ED PROVIDER NOTE - OBJECTIVE STATEMENT
77 yr old male with hx of CABG, DM, HTN, HLD, CAD, CHF presents with shortness of breath on exertion and movement for the last 4 days. Pt reports developing left sided chest pain, non radiating, sharp pain since 1045 am. Denies any fever, chills, n/v/d, urinary complaints or any other symptoms.

## 2022-01-24 NOTE — CONSULT NOTE ADULT - ASSESSMENT
INCOMPLETE     Assessment:  Franky Benson is a 76 year old man, follows with [facility] with past medical history of Coronary artery disease (s/p CABG), Peripheral arterial disease, Hypertension, Hyperlipidemia, Type II Diabetes mellitus and CKD who was recently discharged for CHF exacerbation at outside hospital, now presents with hypotension, likely due to hypovolemia from over-diuresis.    ECG consistent with sinus rhythm, no acute ischemic ST abnormalities, troponins negative x 2, no signs of acute coronary syndrome.     Recommendations:  [] CHF: Will follow up echo to evaluate LVEF. S/p IV lasix boluses, would avoid further IV fluids at this time and encourage PO intake. Plan to start Lasix 40 mg PO daily tomorrow. Monitor urine output. Plan to resume home HF meds if MAP > 65   [] Coronary artery disease s/p CABG: Appears stable, patient reports having recent abnormal CT coronaries with [facility]. Continue aspirin and statin     Assessment:  Franky Benson is a 76 year old man, follows with [facility] with past medical history of Coronary artery disease (s/p CABG in setting of cardiac arrest), Peripheral arterial disease, Hypertension, Hyperlipidemia, Type II Diabetes mellitus and CKD who presents with chest pain and progressive dyspnea on exertion.     He reports that on his way driving home [facility] he started to have sharp chest pain located under his left breast, also reporting progressive dyspnea on exertion. BP significantly elevated on arrival. ECG consistent with sinus rhythm and nonspecific ST abnormalities, troponin negative. Echo from 11/2021 with normal LVEF.     Recommendations:  [] Chest pain and episodes of dyspnea on exertion: Patient reports having prior nuclear stress test at [facility] in 11/2021, will try and obtain records, unknown results. Symptoms may be related to hypertensive urgency, recommend optimization of BP meds. Would repeat troponin. Continue to monitor on telemetry. Given risk factors may benefit from ischemic re-evaluation. Continue home CV meds, including aspirin and statin   [] HFpEF: Appears near-euvolemic, resume home Lasix 40 mg daily     Phyllis Darnell MD  Cardiology    Assessment:  Franky Benson is a 76 year old man, follows with [facility] with past medical history of Coronary artery disease (s/p CABG in setting of cardiac arrest), Peripheral arterial disease, Hypertension, Hyperlipidemia, Type II Diabetes mellitus and CKD who presents with chest pain and progressive dyspnea on exertion.     He reports that on his way driving home [facility] he started to have sharp chest pain located under his left breast, also reporting progressive dyspnea on exertion. BP significantly elevated on arrival. ECG consistent with sinus rhythm and nonspecific ST abnormalities, troponin negative. Echo from 11/2021 with normal LVEF.     Recommendations:  [] Chest pain and episodes of dyspnea on exertion: Patient reports having prior nuclear stress test at [facility] in 11/2021, will try and obtain records, unknown results. Symptoms may be related to hypertensive urgency, recommend optimization of BP meds. Would repeat troponin. Continue to monitor on telemetry. Given risk factors may benefit from ischemic re-evaluation. Continue home CV meds, including aspirin and statin. Follow up echo  [] HFpEF: Appears near-euvolemic, resume home Lasix 40 mg daily     Phyllis Darnell MD  Cardiology

## 2022-01-24 NOTE — ED PROVIDER NOTE - ATTENDING CONTRIBUTION TO CARE
Dr. Xiong: I performed a face to face bedside interview with patient regarding history of present illness, review of symptoms and past medical history. I completed an independent physical exam.  I have discussed patient's plan of care with PA.   I agree with note as stated above, having amended the EMR as needed to reflect my findings.   This includes HISTORY OF PRESENT ILLNESS, HIV, PAST MEDICAL/SURGICAL/FAMILY/SOCIAL HISTORY, ALLERGIES AND HOME MEDICATIONS, REVIEW OF SYSTEMS, PHYSICAL EXAM, and any PROGRESS NOTES during the time I functioned as the attending physician for this patient.    see mdm

## 2022-01-24 NOTE — ED PROVIDER NOTE - WR ORDER NAME 1
Xray Chest 2 Views PA/Lat
Simple: Patient demonstrates quick and easy understanding/Verbalized Understanding

## 2022-01-24 NOTE — PATIENT PROFILE ADULT - FALL HARM RISK - UNIVERSAL INTERVENTIONS
Bed in lowest position, wheels locked, appropriate side rails in place/Call bell, personal items and telephone in reach/Instruct patient to call for assistance before getting out of bed or chair/Non-slip footwear when patient is out of bed/[place] to call system/Physically safe environment - no spills, clutter or unnecessary equipment/Purposeful Proactive Rounding/Room/bathroom lighting operational, light cord in reach

## 2022-01-24 NOTE — PATIENT PROFILE ADULT - STATED REASON FOR ADMISSION
"I had a little pinch in my chest and I have been getting a little short winded the past couple of days"

## 2022-01-24 NOTE — ED PROVIDER NOTE - CLINICAL SUMMARY MEDICAL DECISION MAKING FREE TEXT BOX
Dr. Xiong: 77M h/o CABG in the past, DM, HTN, HLD, CAD, CHF, p/w substernal chest pressure non radiation since 10:45am. For 4 days has had [place], SOB. No fevers or chills, no nausea or vomiting, no abdo pain. No cough. No calf pain. On exam pt is well appearing, nad, RRR, abdo soft/nt/nd, no pedal edema or calf ttp. Given hx will r/o ACS.

## 2022-01-25 LAB
A1C WITH ESTIMATED AVERAGE GLUCOSE RESULT: 7.5 % — HIGH (ref 4–5.6)
ALBUMIN SERPL ELPH-MCNC: 3.3 G/DL — SIGNIFICANT CHANGE UP (ref 3.3–5)
ALP SERPL-CCNC: 84 U/L — SIGNIFICANT CHANGE UP (ref 40–120)
ALT FLD-CCNC: 27 U/L — SIGNIFICANT CHANGE UP (ref 10–45)
ANION GAP SERPL CALC-SCNC: 7 MMOL/L — SIGNIFICANT CHANGE UP (ref 5–17)
AST SERPL-CCNC: 21 U/L — SIGNIFICANT CHANGE UP (ref 10–40)
BASOPHILS # BLD AUTO: 0.06 K/UL — SIGNIFICANT CHANGE UP (ref 0–0.2)
BASOPHILS NFR BLD AUTO: 0.9 % — SIGNIFICANT CHANGE UP (ref 0–2)
BILIRUB SERPL-MCNC: 0.4 MG/DL — SIGNIFICANT CHANGE UP (ref 0.2–1.2)
BUN SERPL-MCNC: 20 MG/DL — SIGNIFICANT CHANGE UP (ref 7–23)
CALCIUM SERPL-MCNC: 10.1 MG/DL — SIGNIFICANT CHANGE UP (ref 8.4–10.5)
CHLORIDE SERPL-SCNC: 106 MMOL/L — SIGNIFICANT CHANGE UP (ref 96–108)
CHOLEST SERPL-MCNC: 117 MG/DL — SIGNIFICANT CHANGE UP
CO2 SERPL-SCNC: 27 MMOL/L — SIGNIFICANT CHANGE UP (ref 22–31)
CREAT SERPL-MCNC: 1.39 MG/DL — HIGH (ref 0.5–1.3)
EOSINOPHIL # BLD AUTO: 0.27 K/UL — SIGNIFICANT CHANGE UP (ref 0–0.5)
EOSINOPHIL NFR BLD AUTO: 4.2 % — SIGNIFICANT CHANGE UP (ref 0–6)
ESTIMATED AVERAGE GLUCOSE: 169 MG/DL — HIGH (ref 68–114)
GLUCOSE BLDC GLUCOMTR-MCNC: 127 MG/DL — HIGH (ref 70–99)
GLUCOSE BLDC GLUCOMTR-MCNC: 130 MG/DL — HIGH (ref 70–99)
GLUCOSE BLDC GLUCOMTR-MCNC: 169 MG/DL — HIGH (ref 70–99)
GLUCOSE BLDC GLUCOMTR-MCNC: 179 MG/DL — HIGH (ref 70–99)
GLUCOSE SERPL-MCNC: 112 MG/DL — HIGH (ref 70–99)
HCT VFR BLD CALC: 39.5 % — SIGNIFICANT CHANGE UP (ref 39–50)
HDLC SERPL-MCNC: 38 MG/DL — LOW
HGB BLD-MCNC: 12.9 G/DL — LOW (ref 13–17)
IMM GRANULOCYTES NFR BLD AUTO: 0.3 % — SIGNIFICANT CHANGE UP (ref 0–1.5)
LIPID PNL WITH DIRECT LDL SERPL: 65 MG/DL — SIGNIFICANT CHANGE UP
LYMPHOCYTES # BLD AUTO: 1.5 K/UL — SIGNIFICANT CHANGE UP (ref 1–3.3)
LYMPHOCYTES # BLD AUTO: 23.4 % — SIGNIFICANT CHANGE UP (ref 13–44)
MCHC RBC-ENTMCNC: 29.7 PG — SIGNIFICANT CHANGE UP (ref 27–34)
MCHC RBC-ENTMCNC: 32.7 GM/DL — SIGNIFICANT CHANGE UP (ref 32–36)
MCV RBC AUTO: 90.8 FL — SIGNIFICANT CHANGE UP (ref 80–100)
MONOCYTES # BLD AUTO: 0.68 K/UL — SIGNIFICANT CHANGE UP (ref 0–0.9)
MONOCYTES NFR BLD AUTO: 10.6 % — SIGNIFICANT CHANGE UP (ref 2–14)
NEUTROPHILS # BLD AUTO: 3.89 K/UL — SIGNIFICANT CHANGE UP (ref 1.8–7.4)
NEUTROPHILS NFR BLD AUTO: 60.6 % — SIGNIFICANT CHANGE UP (ref 43–77)
NON HDL CHOLESTEROL: 79 MG/DL — SIGNIFICANT CHANGE UP
NRBC # BLD: 0 /100 WBCS — SIGNIFICANT CHANGE UP (ref 0–0)
PLATELET # BLD AUTO: 212 K/UL — SIGNIFICANT CHANGE UP (ref 150–400)
POTASSIUM SERPL-MCNC: 5.4 MMOL/L — HIGH (ref 3.5–5.3)
POTASSIUM SERPL-SCNC: 5.4 MMOL/L — HIGH (ref 3.5–5.3)
PROT SERPL-MCNC: 7.6 G/DL — SIGNIFICANT CHANGE UP (ref 6–8.3)
RBC # BLD: 4.35 M/UL — SIGNIFICANT CHANGE UP (ref 4.2–5.8)
RBC # FLD: 14 % — SIGNIFICANT CHANGE UP (ref 10.3–14.5)
SODIUM SERPL-SCNC: 140 MMOL/L — SIGNIFICANT CHANGE UP (ref 135–145)
TRIGL SERPL-MCNC: 71 MG/DL — SIGNIFICANT CHANGE UP
TROPONIN I, HIGH SENSITIVITY RESULT: 28.9 NG/L — SIGNIFICANT CHANGE UP
WBC # BLD: 6.42 K/UL — SIGNIFICANT CHANGE UP (ref 3.8–10.5)
WBC # FLD AUTO: 6.42 K/UL — SIGNIFICANT CHANGE UP (ref 3.8–10.5)

## 2022-01-25 PROCEDURE — 99232 SBSQ HOSP IP/OBS MODERATE 35: CPT

## 2022-01-25 PROCEDURE — 93306 TTE W/DOPPLER COMPLETE: CPT | Mod: 26

## 2022-01-25 RX ADMIN — GABAPENTIN 600 MILLIGRAM(S): 400 CAPSULE ORAL at 22:09

## 2022-01-25 RX ADMIN — SPIRONOLACTONE 25 MILLIGRAM(S): 25 TABLET, FILM COATED ORAL at 06:00

## 2022-01-25 RX ADMIN — Medication 400 UNIT(S): at 11:50

## 2022-01-25 RX ADMIN — INSULIN GLARGINE 10 UNIT(S): 100 INJECTION, SOLUTION SUBCUTANEOUS at 22:09

## 2022-01-25 RX ADMIN — TAMSULOSIN HYDROCHLORIDE 0.4 MILLIGRAM(S): 0.4 CAPSULE ORAL at 22:09

## 2022-01-25 RX ADMIN — Medication 81 MILLIGRAM(S): at 11:50

## 2022-01-25 RX ADMIN — Medication 1 TABLET(S): at 11:50

## 2022-01-25 RX ADMIN — TIOTROPIUM BROMIDE 1 CAPSULE(S): 18 CAPSULE ORAL; RESPIRATORY (INHALATION) at 08:13

## 2022-01-25 RX ADMIN — PREGABALIN 1000 MICROGRAM(S): 225 CAPSULE ORAL at 11:50

## 2022-01-25 RX ADMIN — ATORVASTATIN CALCIUM 80 MILLIGRAM(S): 80 TABLET, FILM COATED ORAL at 22:09

## 2022-01-25 RX ADMIN — PANTOPRAZOLE SODIUM 40 MILLIGRAM(S): 20 TABLET, DELAYED RELEASE ORAL at 06:00

## 2022-01-25 RX ADMIN — AMLODIPINE BESYLATE 10 MILLIGRAM(S): 2.5 TABLET ORAL at 06:00

## 2022-01-25 RX ADMIN — GABAPENTIN 600 MILLIGRAM(S): 400 CAPSULE ORAL at 06:00

## 2022-01-25 RX ADMIN — Medication 2: at 11:07

## 2022-01-25 RX ADMIN — GABAPENTIN 600 MILLIGRAM(S): 400 CAPSULE ORAL at 17:27

## 2022-01-25 RX ADMIN — Medication 40 MILLIGRAM(S): at 06:00

## 2022-01-25 NOTE — PROGRESS NOTE ADULT - ASSESSMENT
77M hx of HTN, HLD, CAD/CABG, CHF, CKD, BPH, PAD, T2DM on insulin, GERD, bladder cancer pw chest pain.      #Chest Pain  - EKG showing NSR, personally reviewed  - Trop initially 25 -- repeat pending  -   - continue telemetry (NSR overnight)  - cardiology consulted   - echo ordered  - will likely need further ischemic workup  - home meds: amlodipine 10mg, lasix 40mg, lisinopril 40mg, spironolactone 25mg  - Not on beta blocker due to bradycardia as per patient  - will attempt to get records    #CAD/CHFpEF  #HTN  #HLD  - Recent TTE with EF 55-60%, nml LV function  - Cont ASA/statin  - continue antihypertensives and lasix for diuresis, spironolactone 25mg daily    #T2DM on insulin with hypoglycemia  - Home insulin: humulin 70/30, 60 units in AM, 25 units in PM  - Continue lantus 10 units qhs, ISS, accucheks  - Encourage po intake  - on last admission, Endocrinology consulted.: plan to dc home on humulin 70/30: 15 iu BID and hold Metformin     #CKD stage III  - Cr at baseline  - monitor renal function    #BPH  - Continue flomax     #Hyperkalemia  - Mildly elevated will monitor for now  - Patient is also on spironolactone     #GERD  - PPI    #DVT ppx  - HSQ   77M hx of HTN, HLD, CAD/CABG, CHF, CKD, BPH, PAD, T2DM on insulin, GERD, bladder cancer pw chest pain.      #Chest Pain  - R/O ACS  - EKG showing NSR, personally reviewed  - Trop initially 25 -- repeat pending  -   - continue telemetry (NSR overnight)  - cardiology consulted   - echo ordered  - will likely need further ischemic workup  - home meds: amlodipine 10mg, lasix 40mg, lisinopril 40mg, spironolactone 25mg  - Not on beta blocker due to bradycardia as per patient  - will attempt to get records    #CAD/CHFpEF  #HTN  #HLD  - Recent TTE with EF 55-60%, nml LV function  - Cont ASA/statin  - continue antihypertensives and lasix for diuresis, spironolactone 25mg daily    #T2DM on insulin with hypoglycemia  - Home insulin: humulin 70/30, 60 units in AM, 25 units in PM  - Continue lantus 10 units qhs, ISS, accucheks  - Encourage po intake  - on last admission, Endocrinology consulted.: plan to dc home on humulin 70/30: 15 iu BID and hold Metformin     #CKD stage III  - Cr at baseline  - monitor renal function    #BPH  - Continue flomax     #Hyperkalemia  - Mildly elevated will monitor for now  - Patient is also on spironolactone     #GERD  - PPI    #DVT ppx  - HSQ

## 2022-01-25 NOTE — PROGRESS NOTE ADULT - ASSESSMENT
Assessment:  Franky Benson is a 76 year old man, follows with [facility] with past medical history of Coronary artery disease (s/p CABG in setting of cardiac arrest), Peripheral arterial disease, Hypertension, Hyperlipidemia, Type II Diabetes mellitus and CKD who presents with chest pain and progressive dyspnea on exertion.     He reports that on his way driving [facility] he started to have sharp chest pain located under his left breast, also reporting progressive dyspnea on exertion. BP significantly elevated on arrival. ECG consistent with sinus rhythm and nonspecific ST abnormalities, troponin negative. Echo from 11/2021 with normal LVEF.     Recommendations:  [] Chest pain and episodes of dyspnea on exertion: Patient reports having prior nuclear stress test at [facility] in 11/2021, will try and obtain records, unknown results. Troponins now negative x 2 and patient reporting his chest pain may be related to the beans he was eating. Continue to monitor on telemetry. Given risk factors may benefit from ischemic re-evaluation, pending what workup was recently done at the [facility]. Continue home CV meds, including aspirin and statin. Follow up echo  [] HFpEF: Appears near-euvolemic, continue home Lasix 40 mg daily    We will continue to follow along.      Phyllis Darnell MD  Cardiology

## 2022-01-25 NOTE — CHART NOTE - NSCHARTNOTEFT_GEN_A_CORE
[facility] [place] and received NM stress test completed 12/16/21. Copy given to cardiology  Reports states there is a small area of apical anterolateral ischemia and a small area of partially reversible defect involving the basal inferolateral wall suggestive of mild marshall-infarction ischemia.  There is a mild septal wall hypokinesis and basal inferolateral wall hypokinesis with LVEF 55%. Hypokinesis can be related to post CABG.    Patient previously had a Cardiac CT which showed calcified plaque at the ostium of the left circumflex with 25-49% stenosis. 10/2021

## 2022-01-26 ENCOUNTER — TRANSCRIPTION ENCOUNTER (OUTPATIENT)
Age: 77
End: 2022-01-26

## 2022-01-26 VITALS
HEART RATE: 68 BPM | SYSTOLIC BLOOD PRESSURE: 147 MMHG | DIASTOLIC BLOOD PRESSURE: 64 MMHG | RESPIRATION RATE: 18 BRPM | OXYGEN SATURATION: 98 % | TEMPERATURE: 98 F

## 2022-01-26 LAB
ALBUMIN SERPL ELPH-MCNC: 3.1 G/DL — LOW (ref 3.3–5)
ALP SERPL-CCNC: 71 U/L — SIGNIFICANT CHANGE UP (ref 40–120)
ALT FLD-CCNC: 23 U/L — SIGNIFICANT CHANGE UP (ref 10–45)
ANION GAP SERPL CALC-SCNC: 8 MMOL/L — SIGNIFICANT CHANGE UP (ref 5–17)
AST SERPL-CCNC: 15 U/L — SIGNIFICANT CHANGE UP (ref 10–40)
BILIRUB SERPL-MCNC: 0.5 MG/DL — SIGNIFICANT CHANGE UP (ref 0.2–1.2)
BUN SERPL-MCNC: 24 MG/DL — HIGH (ref 7–23)
CALCIUM SERPL-MCNC: 9.7 MG/DL — SIGNIFICANT CHANGE UP (ref 8.4–10.5)
CHLORIDE SERPL-SCNC: 107 MMOL/L — SIGNIFICANT CHANGE UP (ref 96–108)
CO2 SERPL-SCNC: 25 MMOL/L — SIGNIFICANT CHANGE UP (ref 22–31)
CREAT SERPL-MCNC: 1.46 MG/DL — HIGH (ref 0.5–1.3)
GLUCOSE BLDC GLUCOMTR-MCNC: 172 MG/DL — HIGH (ref 70–99)
GLUCOSE BLDC GLUCOMTR-MCNC: 185 MG/DL — HIGH (ref 70–99)
GLUCOSE SERPL-MCNC: 135 MG/DL — HIGH (ref 70–99)
HCT VFR BLD CALC: 36.9 % — LOW (ref 39–50)
HGB BLD-MCNC: 12.4 G/DL — LOW (ref 13–17)
MAGNESIUM SERPL-MCNC: 2.3 MG/DL — SIGNIFICANT CHANGE UP (ref 1.6–2.6)
MCHC RBC-ENTMCNC: 29.3 PG — SIGNIFICANT CHANGE UP (ref 27–34)
MCHC RBC-ENTMCNC: 33.6 GM/DL — SIGNIFICANT CHANGE UP (ref 32–36)
MCV RBC AUTO: 87.2 FL — SIGNIFICANT CHANGE UP (ref 80–100)
NRBC # BLD: 0 /100 WBCS — SIGNIFICANT CHANGE UP (ref 0–0)
PHOSPHATE SERPL-MCNC: 3.4 MG/DL — SIGNIFICANT CHANGE UP (ref 2.5–4.5)
PLATELET # BLD AUTO: 178 K/UL — SIGNIFICANT CHANGE UP (ref 150–400)
POTASSIUM SERPL-MCNC: 4.2 MMOL/L — SIGNIFICANT CHANGE UP (ref 3.5–5.3)
POTASSIUM SERPL-SCNC: 4.2 MMOL/L — SIGNIFICANT CHANGE UP (ref 3.5–5.3)
PROT SERPL-MCNC: 7.1 G/DL — SIGNIFICANT CHANGE UP (ref 6–8.3)
RBC # BLD: 4.23 M/UL — SIGNIFICANT CHANGE UP (ref 4.2–5.8)
RBC # FLD: 14.2 % — SIGNIFICANT CHANGE UP (ref 10.3–14.5)
SODIUM SERPL-SCNC: 140 MMOL/L — SIGNIFICANT CHANGE UP (ref 135–145)
WBC # BLD: 6.02 K/UL — SIGNIFICANT CHANGE UP (ref 3.8–10.5)
WBC # FLD AUTO: 6.02 K/UL — SIGNIFICANT CHANGE UP (ref 3.8–10.5)

## 2022-01-26 PROCEDURE — 84484 ASSAY OF TROPONIN QUANT: CPT

## 2022-01-26 PROCEDURE — 87635 SARS-COV-2 COVID-19 AMP PRB: CPT

## 2022-01-26 PROCEDURE — G0378: CPT

## 2022-01-26 PROCEDURE — 83735 ASSAY OF MAGNESIUM: CPT

## 2022-01-26 PROCEDURE — 99232 SBSQ HOSP IP/OBS MODERATE 35: CPT

## 2022-01-26 PROCEDURE — 36415 COLL VENOUS BLD VENIPUNCTURE: CPT

## 2022-01-26 PROCEDURE — 99239 HOSP IP/OBS DSCHRG MGMT >30: CPT

## 2022-01-26 PROCEDURE — 99285 EMERGENCY DEPT VISIT HI MDM: CPT

## 2022-01-26 PROCEDURE — 80061 LIPID PANEL: CPT

## 2022-01-26 PROCEDURE — 84100 ASSAY OF PHOSPHORUS: CPT

## 2022-01-26 PROCEDURE — 83036 HEMOGLOBIN GLYCOSYLATED A1C: CPT

## 2022-01-26 PROCEDURE — 94640 AIRWAY INHALATION TREATMENT: CPT

## 2022-01-26 PROCEDURE — 85027 COMPLETE CBC AUTOMATED: CPT

## 2022-01-26 PROCEDURE — 93005 ELECTROCARDIOGRAM TRACING: CPT

## 2022-01-26 PROCEDURE — 80053 COMPREHEN METABOLIC PANEL: CPT

## 2022-01-26 PROCEDURE — 85025 COMPLETE CBC W/AUTO DIFF WBC: CPT

## 2022-01-26 PROCEDURE — 71045 X-RAY EXAM CHEST 1 VIEW: CPT

## 2022-01-26 PROCEDURE — 83880 ASSAY OF NATRIURETIC PEPTIDE: CPT

## 2022-01-26 PROCEDURE — 82962 GLUCOSE BLOOD TEST: CPT

## 2022-01-26 PROCEDURE — 93306 TTE W/DOPPLER COMPLETE: CPT

## 2022-01-26 RX ORDER — SPIRONOLACTONE 25 MG/1
1 TABLET, FILM COATED ORAL
Qty: 30 | Refills: 0
Start: 2022-01-26 | End: 2022-02-24

## 2022-01-26 RX ORDER — METOPROLOL TARTRATE 50 MG
0.5 TABLET ORAL
Qty: 15 | Refills: 0
Start: 2022-01-26 | End: 2022-02-24

## 2022-01-26 RX ORDER — INSULIN LISPRO 100/ML
VIAL (ML) SUBCUTANEOUS AT BEDTIME
Refills: 0 | Status: DISCONTINUED | OUTPATIENT
Start: 2022-01-26 | End: 2022-01-26

## 2022-01-26 RX ORDER — SPIRONOLACTONE 25 MG/1
1 TABLET, FILM COATED ORAL
Qty: 0 | Refills: 0 | DISCHARGE

## 2022-01-26 RX ORDER — METOPROLOL TARTRATE 50 MG
12.5 TABLET ORAL ONCE
Refills: 0 | Status: COMPLETED | OUTPATIENT
Start: 2022-01-26 | End: 2022-01-26

## 2022-01-26 RX ADMIN — Medication 1 TABLET(S): at 11:04

## 2022-01-26 RX ADMIN — GABAPENTIN 600 MILLIGRAM(S): 400 CAPSULE ORAL at 13:32

## 2022-01-26 RX ADMIN — Medication 40 MILLIGRAM(S): at 05:27

## 2022-01-26 RX ADMIN — Medication 81 MILLIGRAM(S): at 11:04

## 2022-01-26 RX ADMIN — Medication 2: at 11:58

## 2022-01-26 RX ADMIN — PREGABALIN 1000 MICROGRAM(S): 225 CAPSULE ORAL at 11:04

## 2022-01-26 RX ADMIN — Medication 12.5 MILLIGRAM(S): at 15:56

## 2022-01-26 RX ADMIN — Medication 400 UNIT(S): at 11:04

## 2022-01-26 RX ADMIN — TIOTROPIUM BROMIDE 1 CAPSULE(S): 18 CAPSULE ORAL; RESPIRATORY (INHALATION) at 08:50

## 2022-01-26 RX ADMIN — SPIRONOLACTONE 25 MILLIGRAM(S): 25 TABLET, FILM COATED ORAL at 05:26

## 2022-01-26 RX ADMIN — Medication 2: at 08:10

## 2022-01-26 RX ADMIN — PANTOPRAZOLE SODIUM 40 MILLIGRAM(S): 20 TABLET, DELAYED RELEASE ORAL at 05:27

## 2022-01-26 RX ADMIN — GABAPENTIN 600 MILLIGRAM(S): 400 CAPSULE ORAL at 05:26

## 2022-01-26 RX ADMIN — AMLODIPINE BESYLATE 10 MILLIGRAM(S): 2.5 TABLET ORAL at 05:27

## 2022-01-26 NOTE — DISCHARGE NOTE PROVIDER - ATTENDING DISCHARGE PHYSICAL EXAMINATION:
Vital Signs Last 24 Hrs  T(C): 36.8 (26 Jan 2022 08:00), Max: 36.8 (25 Jan 2022 16:04)  T(F): 98.2 (26 Jan 2022 08:00), Max: 98.2 (25 Jan 2022 16:04)  HR: 58 (26 Jan 2022 08:50) (56 - 66)  BP: 133/60 (26 Jan 2022 08:00) (130/67 - 152/67)  BP(mean): --  RR: 17 (26 Jan 2022 08:00) (16 - 19)  SpO2: 96% (26 Jan 2022 08:50) (96% - 97%)    PHYSICAL EXAM:  GENERAL: NAD  HEAD:  AT/NC   EYES: EOMI, PERRL, conjunctiva and sclera clear  NERVOUS SYSTEM:  Alert & Oriented X3, moves all extremities  CHEST/LUNG: Clear to percussion bilaterally; No rales, rhonchi, wheezing, or rubs  HEART: Regular rate and rhythm; No murmurs, rubs, or gallops  ABDOMEN: Soft, Nontender, Nondistended; Bowel sounds present

## 2022-01-26 NOTE — DISCHARGE NOTE PROVIDER - NSDCMRMEDTOKEN_GEN_ALL_CORE_FT
amLODIPine 10 mg oral tablet: 1 tab(s) orally once a day  aspirin 81 mg oral tablet: 1 tab(s) orally once a day  Bacid (LAC) oral capsule: 1 cap(s) orally once a day  cyanocobalamin 1000 mcg oral tablet: 1 tab(s) orally once a day  Flomax 0.4 mg oral capsule: 1 cap(s) orally once a day  gabapentin 300 mg oral capsule: 2 cap(s) orally 3 times a day  HumuLIN 70/30 subcutaneous suspension: 15 unit(s) subcutaneous 2 times a day  Lasix 40 mg oral tablet: 1 tab(s) orally once a day  Lipitor 80 mg oral tablet: 1 tab(s) orally once a day  lisinopril 40 mg oral tablet: 1 tab(s) orally once a day  metoprolol succinate 25 mg oral capsule, extended release: 0.5 cap(s) orally once a day   Spiriva 18 mcg inhalation capsule: 1 cap(s) inhaled once a day  spironolactone 25 mg oral tablet: 1 tab(s) orally once a day  Vitamin D3 400 intl units (10 mcg) oral tablet: 1 tab(s) orally once a day  Zetia 10 mg oral tablet: 1 tab(s) orally once a day

## 2022-01-26 NOTE — DISCHARGE NOTE PROVIDER - HOSPITAL COURSE
Hospital Course  77M hx of HTN, HLD, CAD/CABG, CHF, CKD, BPH, PAD, T2DM on insulin, GERD, bladder cancer pw chest pain. Cardio consulted, recommendations appreciated. [facility]. Troponin negative. TTE performed.   Start Metoprolol ER 12.5mg daily. Home medications continued.   Follow up closely with outpatient cardiologist.     Discharging Provider:  Phani Alamo DO  Contact Info: Cell [phone]  Please call with any questions or concerns.

## 2022-01-26 NOTE — PROGRESS NOTE ADULT - SUBJECTIVE AND OBJECTIVE BOX
CC: Patient is a 77y old  Male who presents with a chief complaint of Chest Pain (25 Jan 2022 09:23)      Interval History:  Patient seen and examined at bedside.  No overnight events  No complaints this morning  Tele shows NSR    ALLERGIES:  No Known Allergies    MEDICATIONS  (STANDING):  amLODIPine   Tablet 10 milliGRAM(s) Oral daily  aspirin  chewable 81 milliGRAM(s) Oral daily  atorvastatin 80 milliGRAM(s) Oral at bedtime  cholecalciferol 400 Unit(s) Oral daily  cyanocobalamin 1000 MICROGram(s) Oral daily  dextrose 40% Gel 15 Gram(s) Oral once  dextrose 5%. 1000 milliLiter(s) (50 mL/Hr) IV Continuous <Continuous>  dextrose 50% Injectable 25 Gram(s) IV Push once  furosemide    Tablet 40 milliGRAM(s) Oral daily  gabapentin 600 milliGRAM(s) Oral three times a day  glucagon  Injectable 1 milliGRAM(s) IntraMuscular once  heparin   Injectable 5000 Unit(s) SubCutaneous every 12 hours  insulin glargine Injectable (LANTUS) 10 Unit(s) SubCutaneous at bedtime  insulin lispro (ADMELOG) corrective regimen sliding scale   SubCutaneous three times a day before meals  lactobacillus acidophilus 1 Tablet(s) Oral daily  pantoprazole    Tablet 40 milliGRAM(s) Oral before breakfast  spironolactone 25 milliGRAM(s) Oral daily  tamsulosin 0.4 milliGRAM(s) Oral at bedtime  tiotropium 18 MICROgram(s) Capsule 1 Capsule(s) Inhalation daily    MEDICATIONS  (PRN):  acetaminophen     Tablet .. 650 milliGRAM(s) Oral every 6 hours PRN Temp greater or equal to 38C (100.4F), Mild Pain (1 - 3)  aluminum hydroxide/magnesium hydroxide/simethicone Suspension 30 milliLiter(s) Oral every 4 hours PRN Dyspepsia  melatonin 3 milliGRAM(s) Oral at bedtime PRN Insomnia  ondansetron Injectable 4 milliGRAM(s) IV Push every 8 hours PRN Nausea and/or Vomiting    Vital Signs Last 24 Hrs  T(F): 98.1 (25 Jan 2022 08:00), Max: 98.3 (25 Jan 2022 05:49)  HR: 62 (25 Jan 2022 08:19) (57 - 76)  BP: 132/59 (25 Jan 2022 08:00) (132/59 - 202/73)  RR: 15 (25 Jan 2022 08:00) (14 - 20)  SpO2: 98% (25 Jan 2022 08:19) (95% - 100%)  I&O's Summary    BMI (kg/m2): 31.1 (01-24-22 @ 19:43)    PHYSICAL EXAM:  GENERAL: NAD  HENT:  Atraumatic, Normocephalic; No tonsillar erythema, exudates, or enlargement; Moist mucous membranes  EYES: EOMI, PERRLA, conjunctiva and sclera clear, no lid-lag; moist conjunctivae  NECK: Supple, No JVD, Normal thyroid, FROM of neck  NERVOUS SYSTEM:  CN II - XII intact; Sensation intact; follows commands  CHEST/LUNG: Clear to percussion bilaterally; No rales, rhonchi, wheezing, or rubs; normal respiratory effort, no intercostal retractions  HEART: Regular rate and rhythm; No murmurs, rubs, or gallops; No pitting edema  ABDOMEN: Soft, Nontender, Nondistended; Bowel sounds present; No HSM or masses  MUSCULOSKELETAL/EXTREMITIES:  2+ Peripheral Pulses, No clubbing or cyanosis; FROM of extremities  PSYCH: Appropriate affect, Alert & Oriented x 3, Good Memory; Good insight    LABS:                        12.9   6.42  )-----------( 212      ( 25 Jan 2022 05:30 )             39.5       01-25    140  |  106  |  20  ----------------------------<  112  5.4   |  27  |  1.39    Ca    10.1      25 Jan 2022 05:30    TPro  7.6  /  Alb  3.3  /  TBili  0.4  /  DBili  x   /  AST  21  /  ALT  27  /  AlkPhos  84  01-25     eGFR if Non African American: 49 mL/min/1.73M2 (01-25-22 @ 05:30)  eGFR if African American: 57 mL/min/1.73M2 (01-25-22 @ 05:30)         CARDIAC MARKERS ( 25 Jan 2022 05:30 )  x     / 28.9 ng/L / x     / x     / x      CARDIAC MARKERS ( 24 Jan 2022 15:00 )  x     / 25.0 ng/L / x     / x     / x        POCT Blood Glucose.: 127 mg/dL (25 Jan 2022 07:31)  POCT Blood Glucose.: 170 mg/dL (24 Jan 2022 21:34)          COVID-19 PCR: NotDetec (01-24-22 @ 15:00)      Care Discussed with Consultants/Other Providers: Yes  
JAYA US  [id number]      Chief Complaint: Chest pain/Dyspnea on exertion/Hypertension/CAD    Interval History: The patient reports feeling better today, thinks that the chest pain may have been related to eating large can of beans. Denies recurrent chest pain.     Tele: sinus bradycardia 50s BPM    Current meds:   acetaminophen     Tablet .. 650 milliGRAM(s) Oral every 6 hours PRN  aluminum hydroxide/magnesium hydroxide/simethicone Suspension 30 milliLiter(s) Oral every 4 hours PRN  amLODIPine   Tablet 10 milliGRAM(s) Oral daily  aspirin  chewable 81 milliGRAM(s) Oral daily  atorvastatin 80 milliGRAM(s) Oral at bedtime  cholecalciferol 400 Unit(s) Oral daily  cyanocobalamin 1000 MICROGram(s) Oral daily  dextrose 40% Gel 15 Gram(s) Oral once  dextrose 5%. 1000 milliLiter(s) IV Continuous <Continuous>  dextrose 50% Injectable 25 Gram(s) IV Push once  furosemide    Tablet 40 milliGRAM(s) Oral daily  gabapentin 600 milliGRAM(s) Oral three times a day  glucagon  Injectable 1 milliGRAM(s) IntraMuscular once  heparin   Injectable 5000 Unit(s) SubCutaneous every 12 hours  insulin glargine Injectable (LANTUS) 10 Unit(s) SubCutaneous at bedtime  insulin lispro (ADMELOG) corrective regimen sliding scale   SubCutaneous three times a day before meals  lactobacillus acidophilus 1 Tablet(s) Oral daily  melatonin 3 milliGRAM(s) Oral at bedtime PRN  ondansetron Injectable 4 milliGRAM(s) IV Push every 8 hours PRN  pantoprazole    Tablet 40 milliGRAM(s) Oral before breakfast  spironolactone 25 milliGRAM(s) Oral daily  tamsulosin 0.4 milliGRAM(s) Oral at bedtime  tiotropium 18 MICROgram(s) Capsule 1 Capsule(s) Inhalation daily      Objective:     Vital Signs:   T(C): 36.7 (01-25-22 @ 08:00), Max: 36.8 (01-25-22 @ 05:49)  HR: 62 (01-25-22 @ 08:19) (57 - 76)  BP: 132/59 (01-25-22 @ 08:00) (132/59 - 202/73)  RR: 15 (01-25-22 @ 08:00) (14 - 20)  SpO2: 98% (01-25-22 @ 08:19) (95% - 100%)  Wt(kg): --      PHYSICAL EXAM:  General: elderly man, obese, no distress  HEENT: sclera anicteric  Neck: supple  CVS: JVP ~ 7 cm H20, RRR, s1, s2, no murmurs/rubs  Chest: unlabored respirations, clear to auscultation b/l  Abdomen: non-distended  Extremities: no lower extremity edema b/l  Neuro: awake, alert & oriented x 3  Psych: normal affect      Labs:   25 Jan 2022 05:30    140    |  106    |  20     ----------------------------<  112    5.4     |  27     |  1.39     Ca    10.1       25 Jan 2022 05:30    TPro  7.6    /  Alb  3.3    /  TBili  0.4    /  DBili  x      /  AST  21     /  ALT  27     /  AlkPhos  84     25 Jan 2022 05:30                          12.9   6.42  )-----------( 212      ( 25 Jan 2022 05:30 )             39.5           ECG (1/24/22): sinus rhythm, nonspecific     TTE (11/2021):   1. Technically difficult study with poor endocardial visualization.   2. Normal global left ventricular systolic function.   3. Left ventricular ejection fraction, by visual estimation, is 55 to 60%.   4. Normal left ventricular internal cavity size.   5. Mildly increased septal wall thickness.   6. The right ventricle is not well visualized, appears to be normal in size in subcostal view.   7. The left atrium is normal in size.   8. The right atrium is normal in size.   9. Mild thickening and calcification of the anterior mitral valve leaflet.  10. Mild mitral valve regurgitation.  11. Mild tricuspid regurgitation.  12. The aortic valve is not well visualized, appears to have calcification with decreased aortic opening.  13. Trivial pericardial effusion.  14. Recommend clinical correlation with the above findings.    
JAYA US  [id number]      Chief Complaint: Chest pain/Dyspnea on exertion/Hypertension/CAD    Interval History: The patient reports feeling well, denies recurrent chest pain or dyspnea.    Tele: sinus rhythm 70s BPM    Current meds:   acetaminophen     Tablet .. 650 milliGRAM(s) Oral every 6 hours PRN  aluminum hydroxide/magnesium hydroxide/simethicone Suspension 30 milliLiter(s) Oral every 4 hours PRN  amLODIPine   Tablet 10 milliGRAM(s) Oral daily  aspirin  chewable 81 milliGRAM(s) Oral daily  atorvastatin 80 milliGRAM(s) Oral at bedtime  cholecalciferol 400 Unit(s) Oral daily  cyanocobalamin 1000 MICROGram(s) Oral daily  dextrose 40% Gel 15 Gram(s) Oral once  dextrose 5%. 1000 milliLiter(s) IV Continuous <Continuous>  dextrose 50% Injectable 25 Gram(s) IV Push once  furosemide    Tablet 40 milliGRAM(s) Oral daily  gabapentin 600 milliGRAM(s) Oral three times a day  glucagon  Injectable 1 milliGRAM(s) IntraMuscular once  heparin   Injectable 5000 Unit(s) SubCutaneous every 12 hours  insulin glargine Injectable (LANTUS) 10 Unit(s) SubCutaneous at bedtime  insulin lispro (ADMELOG) corrective regimen sliding scale   SubCutaneous three times a day before meals  lactobacillus acidophilus 1 Tablet(s) Oral daily  melatonin 3 milliGRAM(s) Oral at bedtime PRN  ondansetron Injectable 4 milliGRAM(s) IV Push every 8 hours PRN  pantoprazole    Tablet 40 milliGRAM(s) Oral before breakfast  spironolactone 25 milliGRAM(s) Oral daily  tamsulosin 0.4 milliGRAM(s) Oral at bedtime  tiotropium 18 MICROgram(s) Capsule 1 Capsule(s) Inhalation daily      Objective:     Vital Signs:   T(C): 36.7 (01-25-22 @ 08:00), Max: 36.8 (01-25-22 @ 05:49)  HR: 62 (01-25-22 @ 08:19) (57 - 76)  BP: 132/59 (01-25-22 @ 08:00) (132/59 - 202/73)  RR: 15 (01-25-22 @ 08:00) (14 - 20)  SpO2: 98% (01-25-22 @ 08:19) (95% - 100%)  Wt(kg): --      PHYSICAL EXAM:  General: elderly man, obese, no distress  HEENT: sclera anicteric  Neck: supple  CVS: JVP ~ 7 cm H20, RRR, s1, s2, no murmurs/rubs  Chest: unlabored respirations, clear to auscultation b/l  Abdomen: non-distended  Extremities: no lower extremity edema b/l  Neuro: awake, alert & oriented x 3  Psych: normal affect      Labs:   25 Jan 2022 05:30    140    |  106    |  20     ----------------------------<  112    5.4     |  27     |  1.39     Ca    10.1       25 Jan 2022 05:30    TPro  7.6    /  Alb  3.3    /  TBili  0.4    /  DBili  x      /  AST  21     /  ALT  27     /  AlkPhos  84     25 Jan 2022 05:30                          12.9   6.42  )-----------( 212      ( 25 Jan 2022 05:30 )             39.5           ECG (1/24/22): sinus rhythm, nonspecific     TTE (1/26/22):   1. Normal global left ventricular systolic function.   2. Left ventricular ejection fraction, by visual estimation, is 60 to 65%.   3. Mildly increased LV wall thickness.   4. Normal left ventricular internal cavity size.   5. Mildly enlarged left atrium.   6. The right atrium is normal in size.   7. Mild thickening and calcification of the anterior and posterior   mitral valve leaflets.   8. Mild mitral valve regurgitation.   9. Mild tricuspid regurgitation.  10. Mild aortic valve leaflet calcification. No aortic valve stenosis.  11. Mild aortic regurgitation.  12. There is a significant pericardial fat pad present.  13. There is no evidence of pericardial effusion.        Outpatient CT angiogram (10/2021) at [facility]:  LM: no stenosis  LAD: < 25% stenosis   Diagonal: patent  LCx: 25-49% stenosis   OM: patent   RCA: 50-75% stenosis   LIMA-mid LAD: patent      Outpatient Nuclear Stress Test (12/16/21) at [facility]:  Small area of apical anterolateral ischemia and a small area of partially reversible defect involving the basal inferolateral wall suggestive of mild marshall-infarction ischemia, LVEF 55%. Previous nuclear on 6/2/2017 reported small area of apical anterolateral ischemia      
Patient is a 77y old  Male who presents with a chief complaint of Chest Pain (25 Jan 2022 09:53)    Patient seen and examined at bedside.  no acute overnight events    ALLERGIES:  No Known Allergies        Vital Signs Last 24 Hrs  T(F): 98.2 (26 Jan 2022 08:00), Max: 98.2 (25 Jan 2022 16:04)  HR: 58 (26 Jan 2022 08:00) (54 - 66)  BP: 133/60 (26 Jan 2022 08:00) (130/67 - 152/67)  RR: 17 (26 Jan 2022 08:00) (15 - 19)  SpO2: 96% (26 Jan 2022 08:00) (96% - 97%)  I&O's Summary    MEDICATIONS:  acetaminophen     Tablet .. 650 milliGRAM(s) Oral every 6 hours PRN  aluminum hydroxide/magnesium hydroxide/simethicone Suspension 30 milliLiter(s) Oral every 4 hours PRN  amLODIPine   Tablet 10 milliGRAM(s) Oral daily  aspirin  chewable 81 milliGRAM(s) Oral daily  atorvastatin 80 milliGRAM(s) Oral at bedtime  cholecalciferol 400 Unit(s) Oral daily  cyanocobalamin 1000 MICROGram(s) Oral daily  dextrose 40% Gel 15 Gram(s) Oral once  dextrose 5%. 1000 milliLiter(s) IV Continuous <Continuous>  dextrose 50% Injectable 25 Gram(s) IV Push once  furosemide    Tablet 40 milliGRAM(s) Oral daily  gabapentin 600 milliGRAM(s) Oral three times a day  glucagon  Injectable 1 milliGRAM(s) IntraMuscular once  heparin   Injectable 5000 Unit(s) SubCutaneous every 12 hours  insulin glargine Injectable (LANTUS) 10 Unit(s) SubCutaneous at bedtime  insulin lispro (ADMELOG) corrective regimen sliding scale   SubCutaneous three times a day before meals  lactobacillus acidophilus 1 Tablet(s) Oral daily  melatonin 3 milliGRAM(s) Oral at bedtime PRN  ondansetron Injectable 4 milliGRAM(s) IV Push every 8 hours PRN  pantoprazole    Tablet 40 milliGRAM(s) Oral before breakfast  spironolactone 25 milliGRAM(s) Oral daily  tamsulosin 0.4 milliGRAM(s) Oral at bedtime  tiotropium 18 MICROgram(s) Capsule 1 Capsule(s) Inhalation daily      PHYSICAL EXAM:  General: NAD, A/O x 3  ENT: MMM, no thrush  Neck: Supple, No JVD  Lungs: Clear to auscultation bilaterally, non labored  Cardio: S1S2 regular  Abdomen: Soft, Nontender  Extremities: No cyanosis, No edema    LABS:                        12.4   6.02  )-----------( 178      ( 26 Jan 2022 06:41 )             36.9     01-26    140  |  107  |  24  ----------------------------<  135  4.2   |  25  |  1.46    Ca    9.7      26 Jan 2022 06:41  Phos  3.4     01-26  Mg     2.3     01-26    TPro  7.1  /  Alb  3.1  /  TBili  0.5  /  DBili  x   /  AST  15  /  ALT  23  /  AlkPhos  71  01-26    eGFR if Non African American: 46 mL/min/1.73M2 (01-26-22 @ 06:41)  eGFR if : 54 mL/min/1.73M2 (01-26-22 @ 06:41)        CARDIAC MARKERS ( 25 Jan 2022 05:30 )  x     / 28.9 ng/L / x     / x     / x      CARDIAC MARKERS ( 24 Jan 2022 15:00 )  x     / 25.0 ng/L / x     / x     / x          01-25 Chol 117 mg/dL LDL -- HDL 38 mg/dL Trig 71 mg/dL              POCT Blood Glucose.: 172 mg/dL (26 Jan 2022 07:57)  POCT Blood Glucose.: 169 mg/dL (25 Jan 2022 22:08)  POCT Blood Glucose.: 130 mg/dL (25 Jan 2022 17:23)  POCT Blood Glucose.: 179 mg/dL (25 Jan 2022 11:05)          COVID-19 PCR: NotDetec (01-24-22 @ 15:00)      RADIOLOGY & ADDITIONAL TESTS:    < from: TTE Echo Complete w/o Contrast w/ Doppler (01.25.22 @ 09:22) >  Summary:   1. Normal global left ventricular systolic function.   2. Left ventricular ejection fraction, by visual estimation, is 60 to   65%.   3. Mildly increased LV wall thickness.   4. Normal left ventricular internal cavity size.   5. Mildly enlarged left atrium.   6. The right atrium is normal in size.   7. Mild thickening and calcification of the anterior and posterior   mitral valve leaflets.   8. Mild mitral valve regurgitation.   9. Mild tricuspid regurgitation.  10. Mild aortic valve leaflet calcification. No aortic valve stenosis.  11. Mild aortic regurgitation.  12. There is a significant pericardial fat pad present.  13. There is no evidence of pericardial effusion.    [id number] Phyllis Darnell MD Electronically signed on   1/25/2022 at 12:28:27 PM    < end of copied text >      Care Discussed with Consultants/Other Providers:   Dr. Darnell

## 2022-01-26 NOTE — DISCHARGE NOTE NURSING/CASE MANAGEMENT/SOCIAL WORK - NSDCPEFALRISK_GEN_ALL_CORE
For information on Fall & Injury Prevention, visit: [email or web address] OR  [email or web address] OR  https://www.cdc.gov/steadi/patient.html

## 2022-01-26 NOTE — DISCHARGE NOTE NURSING/CASE MANAGEMENT/SOCIAL WORK - NSDCFUADDAPPT_GEN_ALL_CORE_FT
Follow up with cardiologist Dr. Josemanuel Wang in 2 weeks  You stated that you have appointment with Dr. Wang on 2/2/22 at 3:30pm

## 2022-01-26 NOTE — DISCHARGE NOTE NURSING/CASE MANAGEMENT/SOCIAL WORK - PATIENT PORTAL LINK FT
You can access the FollowMyHealth Patient Portal offered by [facility] by registering at the following website: [email or web address]. By joining [email or web address]’s FollowMyHealth portal, you will also be able to view your health information using other applications (apps) compatible with our system.

## 2022-01-26 NOTE — DISCHARGE NOTE PROVIDER - CARE PROVIDER_API CALL
ZAIN HEWITT  Internal Medicine  [address] [place] [id number]  Phone: [phone]  Fax: [phone]  Follow Up Time:

## 2022-01-26 NOTE — PROGRESS NOTE ADULT - ASSESSMENT
77M hx of HTN, HLD, CAD/CABG, CHF, CKD, BPH, PAD, T2DM on insulin, GERD, bladder cancer pw chest pain.    # Chest Pain R/O ACS  # CAD  [facility]  pt with known CAD, RT Coronary artery  trops negative, TTE with nl EF  lipid profile and A1c reviewed  cardiology following  plan to follow [facility] with [facility]  possible ischemic re-evaluation vs cardiac cath  follow up cardiology recs  maintain telemetry monitoring   continue ASA and high intensity statin     # CHFpEF  troponin negative, TTE reviewed  appears euvolemic  continue lasix and aldactone  monitor fluid status    # HTN  # HLD  chronic condition  continue home meds - norvasc, lasix, aldactone  continue high intensity statin therapy  monitor BP    # T2DM on insuline with hypoglycemia  pt on humulin 70/30 60 units in am, 25 units in pm  continue lantus 10 u qhs and ISS  encourage po intake  on last admission, Endo consulted, plan to dc home on humulin 70/30 15 u BID and hold home metformin    # CKD stage 3  Cr at or near baseline  monitor renal function    # BPH  chronic condition  continue flomax    # Hyperkalemia  K normal today  monitor    # GERD  chronic condition  continue protonix    # DVT ppx  continue heparin

## 2022-01-26 NOTE — PROGRESS NOTE ADULT - ATTENDING COMMENTS
77M hx of HTN, HLD, CAD/CABG, diastolic CHF, CKD Stage 3, BPH, PAD, T2DM on insulin, GERD, bladder cancer pw chest pain.   Continue ASA, Amlodipine, Lasix, Spironolactone, Lipitor.   If Cr increases plan to decrease Lasix and/or Spironolactone, currently appears euvolemic.   Cardio consulted, recommendations appreciated.

## 2022-01-26 NOTE — PROGRESS NOTE ADULT - ASSESSMENT
Assessment:  Franky Benson is a 76 year old man, follows with [facility] Cardiology with past medical history of Coronary artery disease (s/p CABG), Peripheral arterial disease, Hypertension, Hyperlipidemia, Type II Diabetes mellitus and CKD who presents with chest pain and progressive dyspnea on exertion.     He reports that on his way driving [facility] he started to have sharp chest pain located under his left breast, also reporting progressive dyspnea on exertion. BP significantly elevated on arrival. ECG consistent with sinus rhythm and nonspecific ST abnormalities, troponin negative. Echo with normal LVEF    Recommendations:  [] Chest pain and episodes of dyspnea on exertion: [facility] and reviewed and appears patient had CT angiogram in October 2021 consistent with moderate stenosis in LCx and RCA, however LIMA-mid LAD graft was patent. Nuclear stress test in December with small area of apical anterolateral ischemia and mild marshall-infarct ischemia in basal inferolateral wall. These ischemic findings may correlate to LCx and RCA lesions, does not appear it was bypassed. Discussed with his cardiologist, Dr. Josemanuel Wang on 1/25, he will call back today to review final recommendations. Continue home CV meds  [] HFpEF: Appears near-euvolemic, continue home Lasix 40 mg daily    Discussed with primary team. The cardiology team to continue to follow along. Will sign out to cardiology team for tomorrow.       Phyllis Darnell MD  Cardiology        Assessment:  Franky Benson is a 76 year old man, follows with [facility] Cardiology with past medical history of Coronary artery disease (s/p CABG), Peripheral arterial disease, Hypertension, Hyperlipidemia, Type II Diabetes mellitus and CKD who presents with chest pain and progressive dyspnea on exertion.     He reports that on his way driving home from the [facility] he started to have sharp chest pain located under his left breast, also reporting progressive dyspnea on exertion. BP significantly elevated on arrival. ECG consistent with sinus rhythm and nonspecific ST abnormalities, troponin negative. Echo with normal LVEF    Recommendations:  [] Chest pain and episodes of dyspnea on exertion: [facility] and reviewed and appears patient had CT angiogram in October 2021 consistent with moderate stenosis in LCx and RCA, however LIMA-mid LAD graft was patent. Nuclear stress test in December with small area of apical anterolateral ischemia and mild marshall-infarct ischemia in basal inferolateral wall. These ischemic findings may correlate to LCx and RCA lesions, does not appear it was bypassed. Discussed with his cardiologist, Dr. Josemanuel Wang on 1/25, he will call back today to review final recommendations. Continue home CV meds  [] HFpEF: Appears near-euvolemic, continue home Lasix 40 mg daily    Discussed with primary team.       Addendum (3:15 PM): Discussed case with patient's cardiologist, Dr. Josemanuel Wang. The patient reports that he is concerned that he may forget to take dual antiplatelets if he received PCI but thinks he would try to remember.  Given the small abnormality on the nuclear stress imaging, recommend medical management for now with addition of metoprolol succinate 12.5 mg daily as patient's compliance is not entirely clear. Patient also hesitant to have coronary angiogram given his CKD and risk for LEONARDO, but recommended patient to follow-up with his cardiologist in 2 weeks. If symptoms return then he should return to ER.                   Phyllis Darnell MD  Cardiology        Assessment:  Franky Benson is a 76 year old man, follows with [facility] Cardiology with past medical history of Coronary artery disease (s/p CABG), Peripheral arterial disease, Hypertension, Hyperlipidemia, Type II Diabetes mellitus and CKD who presents with chest pain and progressive dyspnea on exertion.     He reports that on his way driving home [facility] he started to have sharp chest pain located under his left breast, also reporting progressive dyspnea on exertion. BP significantly elevated on arrival. ECG consistent with sinus rhythm and nonspecific ST abnormalities, troponin negative. Echo with normal LVEF    Recommendations:  [] Chest pain and episodes of dyspnea on exertion: [facility] and reviewed and appears patient had CT angiogram in October 2021 consistent with moderate stenosis in LCx and RCA, however LIMA-mid LAD graft was patent. Nuclear stress test in December with small area of apical anterolateral ischemia and mild marshall-infarct ischemia in basal inferolateral wall. These ischemic findings may correlate to LCx and RCA lesions, does not appear it was bypassed. Discussed with his cardiologist, Dr. Josemanuel Wang on 1/25, he will call back today to review final recommendations. Continue home CV meds  [] HFpEF: Appears near-euvolemic, continue home Lasix 40 mg daily    Discussed with primary team.       Addendum (3:15 PM): Discussed case with patient's cardiologist, Dr. Josemanuel Wang. The patient reports that he is concerned that he may forget to take dual antiplatelets if he received PCI but thinks he would try to remember.  Given the small abnormality on the nuclear stress imaging (one of which is old), recommend medical management for now with addition of metoprolol succinate 12.5 mg daily as patient's compliance is not entirely clear. Patient also hesitant to have coronary angiogram given his CKD and risk for LEONARDO, but recommended patient to follow-up with his cardiologist in 2 weeks. If symptoms return then he should return to ER.                   Phyllis Darnell MD  Cardiology

## 2022-01-26 NOTE — DISCHARGE NOTE PROVIDER - NSDCCPCAREPLAN_GEN_ALL_CORE_FT
PRINCIPAL DISCHARGE DIAGNOSIS  Diagnosis: Chest pain  Assessment and Plan of Treatment: You were admitted for chest pain. You were evaluated by cardiologist Dr Darnell and you were re-started on your Metoprolol succinate 12.5mg daily (available at your pharmacy).   -Continue your home medications  -Follow up with your cardiologist Dr Wang within the week

## 2022-05-02 NOTE — ED ADULT TRIAGE NOTE - LOCATION:
Left arm;

## 2022-07-02 ENCOUNTER — NON-APPOINTMENT (OUTPATIENT)
Age: 77
End: 2022-07-02

## 2023-03-10 VITALS
WEIGHT: 216.93 LBS | DIASTOLIC BLOOD PRESSURE: 53 MMHG | TEMPERATURE: 98 F | HEART RATE: 76 BPM | RESPIRATION RATE: 18 BRPM | OXYGEN SATURATION: 98 % | SYSTOLIC BLOOD PRESSURE: 97 MMHG

## 2023-03-10 LAB
ALBUMIN SERPL ELPH-MCNC: 3.6 G/DL — SIGNIFICANT CHANGE UP (ref 3.3–5)
ALP SERPL-CCNC: 82 U/L — SIGNIFICANT CHANGE UP (ref 40–120)
ALT FLD-CCNC: 25 U/L — SIGNIFICANT CHANGE UP (ref 10–45)
ANION GAP SERPL CALC-SCNC: 7 MMOL/L — SIGNIFICANT CHANGE UP (ref 5–17)
APTT BLD: 44.3 SEC — HIGH (ref 27.5–35.5)
AST SERPL-CCNC: 17 U/L — SIGNIFICANT CHANGE UP (ref 10–40)
BASOPHILS # BLD AUTO: 0.05 K/UL — SIGNIFICANT CHANGE UP (ref 0–0.2)
BASOPHILS NFR BLD AUTO: 0.7 % — SIGNIFICANT CHANGE UP (ref 0–2)
BILIRUB SERPL-MCNC: 0.4 MG/DL — SIGNIFICANT CHANGE UP (ref 0.2–1.2)
BUN SERPL-MCNC: 24 MG/DL — HIGH (ref 7–23)
CALCIUM SERPL-MCNC: 10.4 MG/DL — SIGNIFICANT CHANGE UP (ref 8.4–10.5)
CHLORIDE SERPL-SCNC: 106 MMOL/L — SIGNIFICANT CHANGE UP (ref 96–108)
CO2 SERPL-SCNC: 27 MMOL/L — SIGNIFICANT CHANGE UP (ref 22–31)
CREAT SERPL-MCNC: 1.74 MG/DL — HIGH (ref 0.5–1.3)
EGFR: 40 ML/MIN/1.73M2 — LOW
EOSINOPHIL # BLD AUTO: 0.12 K/UL — SIGNIFICANT CHANGE UP (ref 0–0.5)
EOSINOPHIL NFR BLD AUTO: 1.6 % — SIGNIFICANT CHANGE UP (ref 0–6)
GLUCOSE BLDC GLUCOMTR-MCNC: 107 MG/DL — HIGH (ref 70–99)
GLUCOSE BLDC GLUCOMTR-MCNC: 71 MG/DL — SIGNIFICANT CHANGE UP (ref 70–99)
GLUCOSE SERPL-MCNC: 121 MG/DL — HIGH (ref 70–99)
GLUCOSE SERPL-MCNC: 86 MG/DL — SIGNIFICANT CHANGE UP (ref 70–99)
HCT VFR BLD CALC: 45.3 % — SIGNIFICANT CHANGE UP (ref 39–50)
HGB BLD-MCNC: 14.7 G/DL — SIGNIFICANT CHANGE UP (ref 13–17)
IMM GRANULOCYTES NFR BLD AUTO: 0.1 % — SIGNIFICANT CHANGE UP (ref 0–0.9)
INR BLD: 1.18 RATIO — HIGH (ref 0.88–1.16)
LYMPHOCYTES # BLD AUTO: 1.67 K/UL — SIGNIFICANT CHANGE UP (ref 1–3.3)
LYMPHOCYTES # BLD AUTO: 22 % — SIGNIFICANT CHANGE UP (ref 13–44)
MAGNESIUM SERPL-MCNC: 2.3 MG/DL — SIGNIFICANT CHANGE UP (ref 1.6–2.6)
MCHC RBC-ENTMCNC: 30.6 PG — SIGNIFICANT CHANGE UP (ref 27–34)
MCHC RBC-ENTMCNC: 32.5 GM/DL — SIGNIFICANT CHANGE UP (ref 32–36)
MCV RBC AUTO: 94.2 FL — SIGNIFICANT CHANGE UP (ref 80–100)
MONOCYTES # BLD AUTO: 0.69 K/UL — SIGNIFICANT CHANGE UP (ref 0–0.9)
MONOCYTES NFR BLD AUTO: 9.1 % — SIGNIFICANT CHANGE UP (ref 2–14)
NEUTROPHILS # BLD AUTO: 5.06 K/UL — SIGNIFICANT CHANGE UP (ref 1.8–7.4)
NEUTROPHILS NFR BLD AUTO: 66.5 % — SIGNIFICANT CHANGE UP (ref 43–77)
NRBC # BLD: 0 /100 WBCS — SIGNIFICANT CHANGE UP (ref 0–0)
NT-PROBNP SERPL-SCNC: 284 PG/ML — SIGNIFICANT CHANGE UP (ref 0–300)
PLATELET # BLD AUTO: 194 K/UL — SIGNIFICANT CHANGE UP (ref 150–400)
POTASSIUM SERPL-MCNC: 4.6 MMOL/L — SIGNIFICANT CHANGE UP (ref 3.5–5.3)
POTASSIUM SERPL-SCNC: 4.6 MMOL/L — SIGNIFICANT CHANGE UP (ref 3.5–5.3)
PROT SERPL-MCNC: 7.9 G/DL — SIGNIFICANT CHANGE UP (ref 6–8.3)
PROTHROM AB SERPL-ACNC: 13.7 SEC — HIGH (ref 10.5–13.4)
RBC # BLD: 4.81 M/UL — SIGNIFICANT CHANGE UP (ref 4.2–5.8)
RBC # FLD: 13.6 % — SIGNIFICANT CHANGE UP (ref 10.3–14.5)
SARS-COV-2 RNA SPEC QL NAA+PROBE: SIGNIFICANT CHANGE UP
SODIUM SERPL-SCNC: 140 MMOL/L — SIGNIFICANT CHANGE UP (ref 135–145)
TROPONIN I, HIGH SENSITIVITY RESULT: 15.6 NG/L — SIGNIFICANT CHANGE UP
TROPONIN I, HIGH SENSITIVITY RESULT: 16 NG/L — SIGNIFICANT CHANGE UP
WBC # BLD: 7.6 K/UL — SIGNIFICANT CHANGE UP (ref 3.8–10.5)
WBC # FLD AUTO: 7.6 K/UL — SIGNIFICANT CHANGE UP (ref 3.8–10.5)

## 2023-03-10 PROCEDURE — 99223 1ST HOSP IP/OBS HIGH 75: CPT

## 2023-03-10 PROCEDURE — 71045 X-RAY EXAM CHEST 1 VIEW: CPT | Mod: 26

## 2023-03-10 PROCEDURE — 99285 EMERGENCY DEPT VISIT HI MDM: CPT

## 2023-03-10 PROCEDURE — 71250 CT THORAX DX C-: CPT | Mod: 26,MA

## 2023-03-10 RX ORDER — GABAPENTIN 400 MG/1
600 CAPSULE ORAL THREE TIMES A DAY
Refills: 0 | Status: DISCONTINUED | OUTPATIENT
Start: 2023-03-10 | End: 2023-03-13

## 2023-03-10 RX ORDER — SODIUM CHLORIDE 9 MG/ML
1000 INJECTION, SOLUTION INTRAVENOUS
Refills: 0 | Status: DISCONTINUED | OUTPATIENT
Start: 2023-03-10 | End: 2023-03-13

## 2023-03-10 RX ORDER — ENOXAPARIN SODIUM 100 MG/ML
40 INJECTION SUBCUTANEOUS EVERY 24 HOURS
Refills: 0 | Status: DISCONTINUED | OUTPATIENT
Start: 2023-03-10 | End: 2023-03-13

## 2023-03-10 RX ORDER — DEXTROSE 50 % IN WATER 50 %
25 SYRINGE (ML) INTRAVENOUS ONCE
Refills: 0 | Status: DISCONTINUED | OUTPATIENT
Start: 2023-03-10 | End: 2023-03-13

## 2023-03-10 RX ORDER — METOPROLOL TARTRATE 50 MG
50 TABLET ORAL DAILY
Refills: 0 | Status: DISCONTINUED | OUTPATIENT
Start: 2023-03-11 | End: 2023-03-13

## 2023-03-10 RX ORDER — EZETIMIBE 10 MG/1
1 TABLET ORAL
Qty: 0 | Refills: 0 | DISCHARGE

## 2023-03-10 RX ORDER — INSULIN LISPRO 100/ML
VIAL (ML) SUBCUTANEOUS
Refills: 0 | Status: DISCONTINUED | OUTPATIENT
Start: 2023-03-10 | End: 2023-03-13

## 2023-03-10 RX ORDER — LACTOBACILLUS ACIDOPHILUS 100MM CELL
1 CAPSULE ORAL DAILY
Refills: 0 | Status: DISCONTINUED | OUTPATIENT
Start: 2023-03-10 | End: 2023-03-13

## 2023-03-10 RX ORDER — AMLODIPINE BESYLATE 2.5 MG/1
1 TABLET ORAL
Qty: 0 | Refills: 0 | DISCHARGE

## 2023-03-10 RX ORDER — TIOTROPIUM BROMIDE 18 UG/1
2 CAPSULE ORAL; RESPIRATORY (INHALATION) DAILY
Refills: 0 | Status: DISCONTINUED | OUTPATIENT
Start: 2023-03-10 | End: 2023-03-13

## 2023-03-10 RX ORDER — CHOLECALCIFEROL (VITAMIN D3) 125 MCG
1 CAPSULE ORAL
Qty: 0 | Refills: 0 | DISCHARGE

## 2023-03-10 RX ORDER — AMLODIPINE BESYLATE 2.5 MG/1
5 TABLET ORAL DAILY
Refills: 0 | Status: DISCONTINUED | OUTPATIENT
Start: 2023-03-11 | End: 2023-03-13

## 2023-03-10 RX ORDER — FUROSEMIDE 40 MG
40 TABLET ORAL DAILY
Refills: 0 | Status: DISCONTINUED | OUTPATIENT
Start: 2023-03-11 | End: 2023-03-12

## 2023-03-10 RX ORDER — INSULIN GLARGINE 100 [IU]/ML
15 INJECTION, SOLUTION SUBCUTANEOUS EVERY MORNING
Refills: 0 | Status: DISCONTINUED | OUTPATIENT
Start: 2023-03-10 | End: 2023-03-12

## 2023-03-10 RX ORDER — ATORVASTATIN CALCIUM 80 MG/1
80 TABLET, FILM COATED ORAL AT BEDTIME
Refills: 0 | Status: DISCONTINUED | OUTPATIENT
Start: 2023-03-10 | End: 2023-03-13

## 2023-03-10 RX ORDER — SPIRONOLACTONE 25 MG/1
25 TABLET, FILM COATED ORAL DAILY
Refills: 0 | Status: DISCONTINUED | OUTPATIENT
Start: 2023-03-11 | End: 2023-03-13

## 2023-03-10 RX ORDER — ALBUTEROL 90 UG/1
2 AEROSOL, METERED ORAL EVERY 6 HOURS
Refills: 0 | Status: DISCONTINUED | OUTPATIENT
Start: 2023-03-10 | End: 2023-03-13

## 2023-03-10 RX ORDER — INSULIN GLARGINE 100 [IU]/ML
15 INJECTION, SOLUTION SUBCUTANEOUS AT BEDTIME
Refills: 0 | Status: DISCONTINUED | OUTPATIENT
Start: 2023-03-10 | End: 2023-03-12

## 2023-03-10 RX ORDER — DEXTROSE 50 % IN WATER 50 %
12.5 SYRINGE (ML) INTRAVENOUS ONCE
Refills: 0 | Status: DISCONTINUED | OUTPATIENT
Start: 2023-03-10 | End: 2023-03-13

## 2023-03-10 RX ORDER — ASPIRIN/CALCIUM CARB/MAGNESIUM 324 MG
81 TABLET ORAL DAILY
Refills: 0 | Status: DISCONTINUED | OUTPATIENT
Start: 2023-03-11 | End: 2023-03-13

## 2023-03-10 RX ORDER — TAMSULOSIN HYDROCHLORIDE 0.4 MG/1
1 CAPSULE ORAL
Qty: 0 | Refills: 0 | DISCHARGE

## 2023-03-10 RX ORDER — DEXTROSE 50 % IN WATER 50 %
15 SYRINGE (ML) INTRAVENOUS ONCE
Refills: 0 | Status: DISCONTINUED | OUTPATIENT
Start: 2023-03-10 | End: 2023-03-13

## 2023-03-10 RX ORDER — FUROSEMIDE 40 MG
1 TABLET ORAL
Qty: 0 | Refills: 0 | DISCHARGE

## 2023-03-10 RX ORDER — ATORVASTATIN CALCIUM 80 MG/1
1 TABLET, FILM COATED ORAL
Qty: 0 | Refills: 0 | DISCHARGE

## 2023-03-10 RX ORDER — GLUCAGON INJECTION, SOLUTION 0.5 MG/.1ML
1 INJECTION, SOLUTION SUBCUTANEOUS ONCE
Refills: 0 | Status: DISCONTINUED | OUTPATIENT
Start: 2023-03-10 | End: 2023-03-13

## 2023-03-10 RX ORDER — INSULIN LISPRO 100/ML
VIAL (ML) SUBCUTANEOUS AT BEDTIME
Refills: 0 | Status: DISCONTINUED | OUTPATIENT
Start: 2023-03-10 | End: 2023-03-13

## 2023-03-10 RX ORDER — INSULIN NPH HUM/REG INSULIN HM 70-30/ML
15 VIAL (ML) SUBCUTANEOUS
Qty: 0 | Refills: 0 | DISCHARGE

## 2023-03-10 RX ORDER — PREGABALIN 225 MG/1
1000 CAPSULE ORAL DAILY
Refills: 0 | Status: DISCONTINUED | OUTPATIENT
Start: 2023-03-11 | End: 2023-03-13

## 2023-03-10 RX ORDER — LANOLIN ALCOHOL/MO/W.PET/CERES
3 CREAM (GRAM) TOPICAL AT BEDTIME
Refills: 0 | Status: DISCONTINUED | OUTPATIENT
Start: 2023-03-10 | End: 2023-03-13

## 2023-03-10 RX ORDER — ACETAMINOPHEN 500 MG
650 TABLET ORAL EVERY 6 HOURS
Refills: 0 | Status: DISCONTINUED | OUTPATIENT
Start: 2023-03-10 | End: 2023-03-13

## 2023-03-10 RX ORDER — CHOLECALCIFEROL (VITAMIN D3) 125 MCG
1000 CAPSULE ORAL DAILY
Refills: 0 | Status: DISCONTINUED | OUTPATIENT
Start: 2023-03-11 | End: 2023-03-13

## 2023-03-10 RX ADMIN — INSULIN GLARGINE 15 UNIT(S): 100 INJECTION, SOLUTION SUBCUTANEOUS at 21:21

## 2023-03-10 RX ADMIN — ENOXAPARIN SODIUM 40 MILLIGRAM(S): 100 INJECTION SUBCUTANEOUS at 21:21

## 2023-03-10 RX ADMIN — GABAPENTIN 600 MILLIGRAM(S): 400 CAPSULE ORAL at 21:20

## 2023-03-10 RX ADMIN — ATORVASTATIN CALCIUM 80 MILLIGRAM(S): 80 TABLET, FILM COATED ORAL at 21:20

## 2023-03-10 NOTE — ED ADULT NURSE NOTE - OBJECTIVE STATEMENT
Pt. to ED complaining of midsternal chest pain that started at 1050am while sitting at his computer drinking coffee.  Pt. states that the pain lasted approximately 20 minutes.  Pt. denies any pain at this time.  Pt. denies taking any medication for it.  Pt. denies any shortness of breath, nausea or vomiting.  Skin warm and dry.  Respirations even and unlabored, pt. speaking full sentences.

## 2023-03-10 NOTE — H&P ADULT - NSHPREVIEWOFSYSTEMS_GEN_ALL_CORE
CONSTITUTIONAL: No fever, weight loss, or fatigue  EYES: No eye pain, visual disturbances, or discharge  ENMT:  No difficulty hearing, tinnitus, vertigo; No sinus or throat pain  NECK: No pain or stiffness  RESPIRATORY: No cough, wheezing, chills or hemoptysis; No shortness of breath  CARDIOVASCULAR: + chest pain  GASTROINTESTINAL: No abdominal or epigastric pain. No nausea, vomiting, or hematemesis; No diarrhea or constipation. No melena or hematochezia.  GENITOURINARY: No dysuria, frequency, hematuria, or incontinence  NEUROLOGICAL: No headaches, memory loss, loss of strength, numbness, or tremors  SKIN: No itching, burning, rashes, or lesions   MUSCULOSKELETAL: No joint pain or swelling; No muscle, back, or extremity pain  PSYCHIATRIC: No depression, anxiety, mood swings, or difficulty sleeping  ALLERGY AND IMMUNOLOGIC: No hives or eczema    ALL ROS REVIEWED AND NORMAL EXCEPT AS STATED ABOVE

## 2023-03-10 NOTE — ED PROVIDER NOTE - CLINICAL SUMMARY MEDICAL DECISION MAKING FREE TEXT BOX
patient with hx cad, cabg with left sided chest pain  will place on obs for monitoring, cardiac workup

## 2023-03-10 NOTE — H&P ADULT - HISTORY OF PRESENT ILLNESS
77 yo man with history of CAD s/p CABG, Diabetes Mellitus II, Hypertension, Hyperlipidemia, and history CHF comes to ED with 30 minutes of sharp chest pain around 11 am. Patient was sitting down when chest pain started on lower left side of chest. It was non-radiating and sharp. The pain resolved while in the ED. He denies any alleviating or exacerbating factors. He denies fever, chills, shortness of breath, nausea, vomiting, palpitations, dizziness, or change in vision.    In the ED, Temp 97.7, HR 76, BP 97/53, RR 18, O2 Sat 98%. Troponins were neg x 2. EKG with T wave abnormality. Admitted to rule out ACS.

## 2023-03-10 NOTE — CONSULT NOTE ADULT - SUBJECTIVE AND OBJECTIVE BOX
[facility] Consultation    CHIEF COMPLAINT: Patient is a 78y old  Male who presents with a chief complaint of Chest pain (10 Mar 2023 13:59)  asked to see patient regarding chest pain   patient seen and examined  chart is reviewed     HPI:  77 yo man with history of CAD s/p CABG, Diabetes Mellitus II, Hypertension, Hyperlipidemia, and history CHF comes to ED with 30 minutes of sharp chest pain around 11 am. Patient was sitting down when chest pain started on lower left side of chest. It was non-radiating and sharp. The pain resolved while in the ED. He denies any alleviating or exacerbating factors. He denies fever, chills, shortness of breath, nausea, vomiting, palpitations, dizziness, or change in vision.    In the ED, Temp 97.7, HR 76, BP 97/53, RR 18, O2 Sat 98%. Troponins were neg x 2. EKG with T wave abnormality. Admitted to rule out ACS.  (10 Mar 2023 13:59)    As above.  He is currently pain-free and is comfortable.       PAST MEDICAL & SURGICAL HISTORY:  HTN (hypertension)      HLD (hyperlipidemia)      Diabetes mellitus, type 2      CAD (coronary artery disease)      Chronic CHF      CKD (chronic kidney disease)      S/P CABG x 1      History of appendectomy      History of cholecystectomy      H/O abdominal surgery  for &#x27;stomach cancer&#x27;    Social History:  Former smoker of cigarettes (stopped in Jan 2005), drinks alcohol rarely, and denies illicit drug use (10 Mar 2023 13:59)      FHx: kidney failure (Sibling)    `Home Medications:  albuterol 90 mcg/inh inhalation aerosol: 2 puff(s) inhaled every 6 hours, As Needed (10 Mar 2023 14:21)  Alogliptin 12.5 mg oral tablet: 1 tab(s) orally once a day (10 Mar 2023 14:08)  amLODIPine 5 mg oral tablet: 1 tab(s) orally once a day (10 Mar 2023 14:08)  aspirin 81 mg oral tablet: 1 tab(s) orally once a day (10 Mar 2023 14:08)  atorvastatin 80 mg oral tablet: 1 tab(s) orally once a day (10 Mar 2023 14:08)  Bacid (LAC) oral capsule: 1 cap(s) orally once a day (10 Mar 2023 14:08)  cholecalciferol 400 intl units (10 mcg) oral tablet: 2 tab(s) orally once a day (10 Mar 2023 14:23)  cyanocobalamin 1000 mcg oral tablet: 1 tab(s) orally once a day (10 Mar 2023 14:08)  empagliflozin 25 mg oral tablet: 1 tab(s) orally once a day (in the morning) (10 Mar 2023 14:08)  ezetimibe 10 mg oral tablet: 1 tab(s) orally once a day (10 Mar 2023 14:27)  furosemide 40 mg oral tablet: 1 tab(s) orally once a day (10 Mar 2023 14:08)  gabapentin 300 mg oral capsule: 2 cap(s) orally 3 times a day (10 Mar 2023 14:08)  lisinopril 40 mg oral tablet: 1 tab(s) orally once a day (10 Mar 2023 14:08)  metFORMIN 500 mg oral tablet: 2 tab(s) orally 2 times a day (10 Mar 2023 14:37)  metoprolol succinate 100 mg oral tablet, extended release: 0.5 tab(s) orally once a day (10 Mar 2023 14:38)  NovoLIN 70/30 FlexPen subcutaneous suspension: 25 unit(s) subcutaneous once a day (at bedtime) (10 Mar 2023 14:29)  NovoLIN 70/30 subcutaneous suspension: 60 unit(s) subcutaneous once a day (in the morning) (10 Mar 2023 14:35)  Spiriva 18 mcg inhalation capsule: 1 cap(s) inhaled once a day (10 Mar 2023 14:08)      MEDICATIONS  (STANDING):  atorvastatin 80 milliGRAM(s) Oral at bedtime  dextrose 5%. 1000 milliLiter(s) (50 mL/Hr) IV Continuous <Continuous>  dextrose 5%. 1000 milliLiter(s) (100 mL/Hr) IV Continuous <Continuous>  dextrose 50% Injectable 25 Gram(s) IV Push once  dextrose 50% Injectable 12.5 Gram(s) IV Push once  dextrose 50% Injectable 25 Gram(s) IV Push once  gabapentin 600 milliGRAM(s) Oral three times a day  glucagon  Injectable 1 milliGRAM(s) IntraMuscular once  insulin glargine Injectable (LANTUS) 15 Unit(s) SubCutaneous every morning  insulin glargine Injectable (LANTUS) 15 Unit(s) SubCutaneous at bedtime  insulin lispro (ADMELOG) corrective regimen sliding scale   SubCutaneous three times a day before meals  insulin lispro (ADMELOG) corrective regimen sliding scale   SubCutaneous at bedtime  lactobacillus acidophilus 1 Tablet(s) Oral daily  tiotropium 2.5 MICROgram(s) Inhaler 2 Puff(s) Inhalation daily    MEDICATIONS  (PRN):  acetaminophen     Tablet .. 650 milliGRAM(s) Oral every 6 hours PRN Temp greater or equal to 38C (100.4F), Mild Pain (1 - 3)  albuterol    90 MICROgram(s) HFA Inhaler 2 Puff(s) Inhalation every 6 hours PRN Shortness of Breath and/or Wheezing  dextrose Oral Gel 15 Gram(s) Oral once PRN Blood Glucose LESS THAN 70 milliGRAM(s)/deciliter  melatonin 3 milliGRAM(s) Oral at bedtime PRN Insomnia      Allergies    No Known Allergies    Intolerances        REVIEW OF SYSTEMS:    CONSTITUTIONAL: No weakness, fevers or chills  EYES: No visual changes, No diplopia  ENMT: No throat pain , No exudate  NECK: No pain or stiffness  RESPIRATORY: No cough, wheezing, hemoptysis; No shortness of breath  CARDIOVASCULAR:    No shortness of breath or dyspnea on exertion.  No palpitations, dizziness, light headedness, syncope or near syncope.  No edema, no orthopnea.   GASTROINTESTINAL: No abdominal pain. No nausea, vomiting, or hematemesis; No diarrhea or constipation. No melena or hematochezia.  GENITOURINARY: No dysuria, frequency or hematuria  NEUROLOGICAL: No numbness or weakness  SKIN: No itching or rash  All other review of systems is negative unless indicated above    VITAL SIGNS:   Vital Signs Last 24 Hrs  T(C): 36.5 (10 Mar 2023 11:19), Max: 36.5 (10 Mar 2023 11:19)  T(F): 97.7 (10 Mar 2023 11:19), Max: 97.7 (10 Mar 2023 11:19)  HR: 76 (10 Mar 2023 11:19) (76 - 76)  BP: 97/53 (10 Mar 2023 11:19) (97/53 - 97/53)  BP(mean): --  RR: 18 (10 Mar 2023 11:19) (18 - 18)  SpO2: 98% (10 Mar 2023 11:19) (98% - 98%)        I&O's Summary      PHYSICAL EXAM:    Constitutional: NAD, awake and alert, well-developed  Eyes:  EOMI,  Pupils round, no lesions  ENMT: no exudate or erythema  Pulmonary: Non-labored, breath sounds are clear bilaterally, No wheezing, rales or rhonchi  Cardiovascular: PMI not palpable non-displaced Regular S1 and S2 l/Vl systolic   Gastrointestinal: Bowel Sounds present, soft, nontender.   Lymph: No peripheral edema. No cervical lymphadenopathy.  Neurological: Alert, no focal deficits  Skin: No rashes. Changes of chronic venous stasis. No cyanosis.  Psych:  Mood & affect appropriate    LABS: All Labs Reviewed:                        14.7   7.60  )-----------( 194      ( 10 Mar 2023 11:33 )             45.3     10 Mar 2023 11:33    140    |  106    |  24     ----------------------------<  121    4.6     |  27     |  1.74     Ca    10.4       10 Mar 2023 11:33  Mg     2.3       10 Mar 2023 11:33    TPro  7.9    /  Alb  3.6    /  TBili  0.4    /  DBili  x      /  AST  17     /  ALT  25     /  AlkPhos  82     10 Mar 2023 11:33    PT/INR - ( 10 Mar 2023 11:33 )   PT: 13.7 sec;   INR: 1.18 ratio         PTT - ( 10 Mar 2023 11:33 )  PTT:44.3 sec      e< from: 12 Lead ECG (03.10.23 @ 11:19) >  Diagnosis Line Sinus bradycardia  Nonspecific ST abnormality  Abnormal ECG  When compared with ECG of 24-JAN-2022 14:42,  No significant change was found    < end of copied text >  < from: TTE Echo Complete w/o Contrast w/ Doppler (01.25.22 @ 09:22) >   1. Normal global left ventricular systolic function.   2. Left ventricular ejection fraction, by visual estimation, is 60 to   65%.   3. Mildly increased LV wall thickness.   4. Normal left ventricular internal cavity size.   5. Mildly enlarged left atrium.   6. The right atrium is normal in size.   7. Mild thickening and calcification of the anterior and posterior   mitral valve leaflets.   8. Mild mitral valve regurgitation.   9. Mild tricuspid regurgitation.  10. Mild aortic valve leaflet calcification. No aortic valve stenosis.  11. Mild aortic regurgitation.  12. There is a significant pericardial fat pad present.  13. There is no evidence of pericardial effusion.    < end of copied text >

## 2023-03-10 NOTE — ED ADULT TRIAGE NOTE - CHIEF COMPLAINT QUOTE
Chest pain Patient to ED for complaints of left sided chest pain started this morning. PMHX: DM, CABG.

## 2023-03-10 NOTE — PATIENT PROFILE ADULT - FALL HARM RISK - FACTORS NURSING JUDGEMENT
Group Topic: BH Coping Skills Education    Date: 12/16/2021  Start Time: 10:00 AM  End Time: 10:50 AM  Facilitators: MANUEL Sharma    Focus:  Coping Skills- Emotional First Aid -Daily Life Skills  Number in attendance:6  The video \"How to practice emotional first aid\" was presented. Emotional first aid was defined and group discussion emphasized ways to practice emotional first aid.     Method: Group  Attendance: Present  Participation: Active  Pt appeared attentive and engaged in group. Pt appeared to have good eye contact. Pt did participate in the group discussion and provided feedback.   MANUEL Sharma     Yes

## 2023-03-10 NOTE — ED PROVIDER NOTE - NS ED ATTENDING STATEMENT MOD
I have seen and examined this patient and fully participated in the care of this patient as the teaching attending.  The service was shared with the CLIFF.  I reviewed and verified the documentation and independently performed the documented:

## 2023-03-10 NOTE — H&P ADULT - NSHPPHYSICALEXAM_GEN_ALL_CORE
T(C): 36.5 (03-10-23 @ 11:19), Max: 36.5 (03-10-23 @ 11:19)  HR: 76 (03-10-23 @ 11:19) (76 - 76)  BP: 97/53 (03-10-23 @ 11:19) (97/53 - 97/53)  RR: 18 (03-10-23 @ 11:19) (18 - 18)  SpO2: 98% (03-10-23 @ 11:19) (98% - 98%)  Wt(kg): --Vital Signs Last 24 Hrs  T(C): 36.5 (10 Mar 2023 11:19), Max: 36.5 (10 Mar 2023 11:19)  T(F): 97.7 (10 Mar 2023 11:19), Max: 97.7 (10 Mar 2023 11:19)  HR: 76 (10 Mar 2023 11:19) (76 - 76)  BP: 97/53 (10 Mar 2023 11:19) (97/53 - 97/53)  BP(mean): --  RR: 18 (10 Mar 2023 11:19) (18 - 18)  SpO2: 98% (10 Mar 2023 11:19) (98% - 98%)        PHYSICAL EXAM:  GENERAL: NAD  HENT:  Atraumatic, Normocephalic; No tonsillar erythema, exudates, or enlargement; Moist mucous membranes;   EYES: EOMI, PERRLA, conjunctiva and sclera clear, no lid-lag  NECK: Supple, No JVD, Normal thyroid  NERVOUS SYSTEM:  CN II - XII intact; Sensation intact; Motor Strength 5/5 B/L upper and lower extremities  CHEST/LUNG: Clear to percussion bilaterally; No rales, rhonchi, wheezing, or rubs; normal respiratory effort, no intercostal retractions; No pitting edema  HEART: Regular rhythm, bradycardic; No murmurs, rubs, or gallops  ABDOMEN: Soft, Nontender, Nondistended; Bowel sounds present; No HSM  MUSCULOSKELETAL/EXTREMITIES:  2+ Peripheral Pulses, No clubbing, or digital cyanosis; FROM of extremities  SKIN: No rashes or lesions; normal texture and temperature  PSYCH: Appropriate affect, Alert & Oriented x 3

## 2023-03-10 NOTE — ED PROVIDER NOTE - CARDIAC, MLM
Normal rate, regular rhythm.  Heart sounds S1, S2.  No murmurs, rubs or gallops. Minimal tenderness to palpation at the left sternal border.

## 2023-03-10 NOTE — H&P ADULT - ASSESSMENT
77 yo man with history of CAD s/p CABG, Diabetes Mellitus II, Hypertension, Hyperlipidemia, and history CHF comes to ED with 30 minutes of sharp chest pain.    Acute Chest pain R/O ACS  History CAD s.p CABG  History HFrEF  - Continue Telemonitoring  - Continue ASA and atorvastatin 80mg  - Continue metoprolol, lasix,   Start Betablocker and ACEI-Metoprolol 25mg po q 12hrs, Enalapril 5mg po BID  Check Guiac, Send FOBT x 3, Start Heparin Drip Drip 12U/kg/hr, , monitor PTT/INR Q6hrs  Get Cardiac Enzyme 3 sets and EKG q8hrly  Monitor CBC, BMP, PTT, Mg, PO4  NPO for now for possible Cath  TTE     Emphysema  - Seen on CT Chest (3/10)   - Continue albuterol PRN and Spiriva    Diabetes Mellitus II - Controlled  - Home meds: Alogliptin, empagliflozin, metformin BID, Novolin 70/30 60 units in AM, 25 units in PM  - A1c _ on _  - Continue Lantus units sc QHS  - Continue pre-meal insulin at   - Hypoglycemia protocol and insulin sliding scale at premeal and at night  - Blood glucose goal 100-180  - Monitor BUN/Cr and electrolytes  - ACEi/ARB for renoprotective effect     Hypertension  - Continue amlodipine    Hyperlipidemia   - Continue statin and ezetimibe    Acute Renal Failure:  Start IVF 1/2 NS @ 83ml/hour for 24hrs, then re-evaluate.  Send urinalysis, urine lytes, spot urine creatine protien ratio, SPEP/UPEP.  Get Renal sonogram if the renal failure does not resolve in 24 hours.  Monitor BUN/Creatinine levels daily.  Renal Consult if the primary care physician agrees.  Hold lisinopril 40mg    DVT Prophylaxis with Lovenox    Daughter Rocio: [phone]       77 yo man with history of CAD s/p CABG, Diabetes Mellitus II, Hypertension, Hyperlipidemia, and history CHF comes to ED with 30 minutes of sharp chest pain.    Acute Chest pain R/O ACS  History CAD s.p CABG  History HFrEF  - Continue Telemonitoring while in observation  - Continue ASA and atorvastatin 80mg  - Continue metoprolol, lasix,   - Hold ACEi due to DOUGLAS  - Cardiac Enzyme neg x 2  - Repeat EKG in AM  - F/u TTE    Emphysema - Chronic  - Seen on CT Chest (3/10)   - Continue albuterol PRN and Spiriva    Diabetes Mellitus II - Controlled  - Home meds: Alogliptin, empagliflozin, metformin BID, Novolin 70/30 60 units in AM, 25 units in PM  - F/U A1c  - Continue with Lantus 15 units sc in AM and in PM for now  - Hypoglycemia protocol and insulin sliding scale at pre-meal and at night  - Blood glucose goal 100-180  - Monitor BUN/Cr and electrolytes    Hypertension  - Continue amlodipine, metoprolol, and lasix  - Monitor vital signs     Hyperlipidemia   - Continue statin and ezetimibe    DOUGLAS on CKD IIIa  - Baseline SCr near 1.4  - Encourage oral intake  - Avoid nephrotoxic agents so holding lisinopril 40mg    DVT Prophylaxis with Lovenox    Daughter Rocio: [phone]  Daughter -in-law Tish: [phone]    Updated daughter about plan.  Dispo: Likely home tomorrow.

## 2023-03-10 NOTE — H&P ADULT - NSHPSOCIALHISTORY_GEN_ALL_CORE
Former smoker of cigarettes (stopped in Jan 2005), drinks alcohol rarely, and denies illicit drug use

## 2023-03-10 NOTE — H&P ADULT - NSHPLABSRESULTS_GEN_ALL_CORE
LABS:                        14.7   7.60  )-----------( 194      ( 10 Mar 2023 11:33 )             45.3     03-10    140  |  106  |  24<H>  ----------------------------<  121<H>  4.6   |  27  |  1.74<H>    Ca    10.4      10 Mar 2023 11:33  Mg     2.3     03-10    TPro  7.9  /  Alb  3.6  /  TBili  0.4  /  DBili  x   /  AST  17  /  ALT  25  /  AlkPhos  82  03-10    PT/INR - ( 10 Mar 2023 11:33 )   PT: 13.7 sec;   INR: 1.18 ratio         PTT - ( 10 Mar 2023 11:33 )  PTT:44.3 sec    CAPILLARY BLOOD GLUCOSE    RADIOLOGY & ADDITIONAL TESTS:  Xray Chest 1 View- PORTABLE-Urgent (03.10.23 @ 11:59) > IMPRESSION: No acute finding or change.    CT Chest No Cont (03.10.23 @ 12:38) > Emphysema. 6 mm right lower lobe nodule. Recommend follow-up chest CT in 6 months to determine stability.    EKG (3/10/23) Sinus Bradycardia: 1st degree AV block, qtc 387, T wave changes    Care Discussed with Consultants/Other Providers [ x] YES  [ ] NO  Imaging Personally Reviewed:  [x ] YES  [ ] NO

## 2023-03-10 NOTE — ED PROVIDER NOTE - OBJECTIVE STATEMENT
78 year old male with PMH significant for COPD, CABG (2005), DM, HTN, h/o stomach and bladder cancer, former smoker (stopped 2005), ED, HLD, h/o chest pain requiring hospitalization in the last year presents complaining of stabbing substernal/ left sternal border chest pain which started about 10:50 am all of sudden as he was eating and lasted about 25 min. He notes to never have felt this intensity of pain before. Currently patient rates the pain 2/10 and isn't in any distress. Patient denies N/V, h/a, SOB, calf tenderness, abdominal pain, changes in bowel movement, dysuria, hematuria, blood in the stool. 78 year old male with PMH significant for COPD, CABG (2005), DM, HTN, h/o stomach and bladder cancer, former smoker (stopped 2005), ED, HLD, h/o chest pain requiring hospitalization in the last year presents complaining of stabbing substernal/ left sternal border chest pain which started about 10:50 am all of sudden as he was eating and lasted about 25 min. He notes to never have felt this intensity of pain before. Currently patient rates the pain 2/10 and isn't in any distress. Patient denies radiation of pain to neck/jaw/shoulder/back, N/V, h/a, SOB, calf tenderness, abdominal pain, changes in bowel movement, dysuria, hematuria, blood in the stool, epigastric pain.

## 2023-03-10 NOTE — ED PROVIDER NOTE - ATTENDING CONTRIBUTION TO CARE
hx htn, dm, cad with left sided chest pain  reports pain different than normal  [id number]  cta b/l, [id number]

## 2023-03-10 NOTE — PATIENT PROFILE ADULT - FALL HARM RISK - HARM RISK INTERVENTIONS

## 2023-03-11 LAB
A1C WITH ESTIMATED AVERAGE GLUCOSE RESULT: 6.8 % — HIGH (ref 4–5.6)
ALBUMIN SERPL ELPH-MCNC: 3.3 G/DL — SIGNIFICANT CHANGE UP (ref 3.3–5)
ALP SERPL-CCNC: 85 U/L — SIGNIFICANT CHANGE UP (ref 40–120)
ALT FLD-CCNC: 24 U/L — SIGNIFICANT CHANGE UP (ref 10–45)
ANION GAP SERPL CALC-SCNC: 8 MMOL/L — SIGNIFICANT CHANGE UP (ref 5–17)
AST SERPL-CCNC: 22 U/L — SIGNIFICANT CHANGE UP (ref 10–40)
BILIRUB SERPL-MCNC: 0.2 MG/DL — SIGNIFICANT CHANGE UP (ref 0.2–1.2)
BUN SERPL-MCNC: 31 MG/DL — HIGH (ref 7–23)
CALCIUM SERPL-MCNC: 10.3 MG/DL — SIGNIFICANT CHANGE UP (ref 8.4–10.5)
CHLORIDE SERPL-SCNC: 105 MMOL/L — SIGNIFICANT CHANGE UP (ref 96–108)
CHOLEST SERPL-MCNC: 122 MG/DL — SIGNIFICANT CHANGE UP
CO2 SERPL-SCNC: 23 MMOL/L — SIGNIFICANT CHANGE UP (ref 22–31)
CREAT SERPL-MCNC: 1.64 MG/DL — HIGH (ref 0.5–1.3)
EGFR: 43 ML/MIN/1.73M2 — LOW
ESTIMATED AVERAGE GLUCOSE: 148 MG/DL — HIGH (ref 68–114)
GLUCOSE BLDC GLUCOMTR-MCNC: 123 MG/DL — HIGH (ref 70–99)
GLUCOSE BLDC GLUCOMTR-MCNC: 137 MG/DL — HIGH (ref 70–99)
GLUCOSE BLDC GLUCOMTR-MCNC: 142 MG/DL — HIGH (ref 70–99)
GLUCOSE BLDC GLUCOMTR-MCNC: 151 MG/DL — HIGH (ref 70–99)
GLUCOSE SERPL-MCNC: 168 MG/DL — HIGH (ref 70–99)
HCT VFR BLD CALC: 45 % — SIGNIFICANT CHANGE UP (ref 39–50)
HDLC SERPL-MCNC: 35 MG/DL — LOW
HGB BLD-MCNC: 14.2 G/DL — SIGNIFICANT CHANGE UP (ref 13–17)
LIPID PNL WITH DIRECT LDL SERPL: 62 MG/DL — SIGNIFICANT CHANGE UP
MAGNESIUM SERPL-MCNC: 2.4 MG/DL — SIGNIFICANT CHANGE UP (ref 1.6–2.6)
MCHC RBC-ENTMCNC: 30.5 PG — SIGNIFICANT CHANGE UP (ref 27–34)
MCHC RBC-ENTMCNC: 31.6 GM/DL — LOW (ref 32–36)
MCV RBC AUTO: 96.8 FL — SIGNIFICANT CHANGE UP (ref 80–100)
NON HDL CHOLESTEROL: 86 MG/DL — SIGNIFICANT CHANGE UP
NRBC # BLD: 0 /100 WBCS — SIGNIFICANT CHANGE UP (ref 0–0)
PHOSPHATE SERPL-MCNC: 3.2 MG/DL — SIGNIFICANT CHANGE UP (ref 2.5–4.5)
PLATELET # BLD AUTO: 189 K/UL — SIGNIFICANT CHANGE UP (ref 150–400)
POTASSIUM SERPL-MCNC: 4.9 MMOL/L — SIGNIFICANT CHANGE UP (ref 3.5–5.3)
POTASSIUM SERPL-SCNC: 4.9 MMOL/L — SIGNIFICANT CHANGE UP (ref 3.5–5.3)
PROT SERPL-MCNC: 7.4 G/DL — SIGNIFICANT CHANGE UP (ref 6–8.3)
RBC # BLD: 4.65 M/UL — SIGNIFICANT CHANGE UP (ref 4.2–5.8)
RBC # FLD: 13.7 % — SIGNIFICANT CHANGE UP (ref 10.3–14.5)
SODIUM SERPL-SCNC: 136 MMOL/L — SIGNIFICANT CHANGE UP (ref 135–145)
TRIGL SERPL-MCNC: 123 MG/DL — SIGNIFICANT CHANGE UP
TROPONIN I, HIGH SENSITIVITY RESULT: 17.5 NG/L — SIGNIFICANT CHANGE UP
WBC # BLD: 7.53 K/UL — SIGNIFICANT CHANGE UP (ref 3.8–10.5)
WBC # FLD AUTO: 7.53 K/UL — SIGNIFICANT CHANGE UP (ref 3.8–10.5)

## 2023-03-11 PROCEDURE — 93306 TTE W/DOPPLER COMPLETE: CPT | Mod: 26

## 2023-03-11 RX ADMIN — AMLODIPINE BESYLATE 5 MILLIGRAM(S): 2.5 TABLET ORAL at 06:11

## 2023-03-11 RX ADMIN — Medication 40 MILLIGRAM(S): at 06:11

## 2023-03-11 RX ADMIN — PREGABALIN 1000 MICROGRAM(S): 225 CAPSULE ORAL at 12:10

## 2023-03-11 RX ADMIN — ATORVASTATIN CALCIUM 80 MILLIGRAM(S): 80 TABLET, FILM COATED ORAL at 21:31

## 2023-03-11 RX ADMIN — GABAPENTIN 600 MILLIGRAM(S): 400 CAPSULE ORAL at 21:31

## 2023-03-11 RX ADMIN — TIOTROPIUM BROMIDE 2 PUFF(S): 18 CAPSULE ORAL; RESPIRATORY (INHALATION) at 08:33

## 2023-03-11 RX ADMIN — GABAPENTIN 600 MILLIGRAM(S): 400 CAPSULE ORAL at 06:11

## 2023-03-11 RX ADMIN — GABAPENTIN 600 MILLIGRAM(S): 400 CAPSULE ORAL at 13:49

## 2023-03-11 RX ADMIN — Medication 1000 UNIT(S): at 12:10

## 2023-03-11 RX ADMIN — SPIRONOLACTONE 25 MILLIGRAM(S): 25 TABLET, FILM COATED ORAL at 06:11

## 2023-03-11 RX ADMIN — INSULIN GLARGINE 15 UNIT(S): 100 INJECTION, SOLUTION SUBCUTANEOUS at 08:02

## 2023-03-11 RX ADMIN — Medication 50 MILLIGRAM(S): at 06:11

## 2023-03-11 RX ADMIN — INSULIN GLARGINE 15 UNIT(S): 100 INJECTION, SOLUTION SUBCUTANEOUS at 21:31

## 2023-03-11 RX ADMIN — Medication 81 MILLIGRAM(S): at 12:09

## 2023-03-11 RX ADMIN — Medication 1 TABLET(S): at 12:10

## 2023-03-11 NOTE — PROGRESS NOTE ADULT - NS ATTEND AMEND GEN_ALL_CORE FT
77 yo man with history of CAD s/p CABG, Diabetes Mellitus II, Hypertension, Hyperlipidemia, and history CHF comes to ED with 30 minutes of sharp chest pain, now resolved.  Patient states cardiology recommending stress test. Plan: f/u echo, apprec carido recs, trops neg x3, monitor clinical course, monitor on tele

## 2023-03-11 NOTE — PROGRESS NOTE ADULT - SUBJECTIVE AND OBJECTIVE BOX
Patient is a 78y old  Male who presents with a chief complaint of Chest pain (10 Mar 2023 15:34)      Patient seen and examined at bedside.    ALLERGIES:  No Known Allergies    MEDICATIONS  (STANDING):  amLODIPine   Tablet 5 milliGRAM(s) Oral daily  aspirin  chewable 81 milliGRAM(s) Oral daily  atorvastatin 80 milliGRAM(s) Oral at bedtime  cholecalciferol 1000 Unit(s) Oral daily  cyanocobalamin 1000 MICROGram(s) Oral daily  dextrose 5%. 1000 milliLiter(s) (50 mL/Hr) IV Continuous <Continuous>  dextrose 5%. 1000 milliLiter(s) (100 mL/Hr) IV Continuous <Continuous>  dextrose 50% Injectable 25 Gram(s) IV Push once  dextrose 50% Injectable 12.5 Gram(s) IV Push once  dextrose 50% Injectable 25 Gram(s) IV Push once  enoxaparin Injectable 40 milliGRAM(s) SubCutaneous every 24 hours  furosemide    Tablet 40 milliGRAM(s) Oral daily  gabapentin 600 milliGRAM(s) Oral three times a day  glucagon  Injectable 1 milliGRAM(s) IntraMuscular once  insulin glargine Injectable (LANTUS) 15 Unit(s) SubCutaneous every morning  insulin glargine Injectable (LANTUS) 15 Unit(s) SubCutaneous at bedtime  insulin lispro (ADMELOG) corrective regimen sliding scale   SubCutaneous three times a day before meals  insulin lispro (ADMELOG) corrective regimen sliding scale   SubCutaneous at bedtime  lactobacillus acidophilus 1 Tablet(s) Oral daily  metoprolol succinate ER 50 milliGRAM(s) Oral daily  spironolactone 25 milliGRAM(s) Oral daily  tiotropium 2.5 MICROgram(s) Inhaler 2 Puff(s) Inhalation daily    MEDICATIONS  (PRN):  acetaminophen     Tablet .. 650 milliGRAM(s) Oral every 6 hours PRN Temp greater or equal to 38C (100.4F), Mild Pain (1 - 3)  albuterol    90 MICROgram(s) HFA Inhaler 2 Puff(s) Inhalation every 6 hours PRN Shortness of Breath and/or Wheezing  dextrose Oral Gel 15 Gram(s) Oral once PRN Blood Glucose LESS THAN 70 milliGRAM(s)/deciliter  melatonin 3 milliGRAM(s) Oral at bedtime PRN Insomnia    Vital Signs Last 24 Hrs  T(F): 97.6 (11 Mar 2023 05:38), Max: 97.6 (11 Mar 2023 05:38)  HR: 52 (11 Mar 2023 05:38) (51 - 53)  BP: 148/70 (11 Mar 2023 05:38) (135/55 - 148/70)  RR: 17 (11 Mar 2023 05:38) (16 - 17)  SpO2: 94% (11 Mar 2023 05:38) (94% - 98%)  I&O's Summary    10 Mar 2023 07:01  -  11 Mar 2023 07:00  --------------------------------------------------------  IN: 300 mL / OUT: 0 mL / NET: 300 mL        PHYSICAL EXAM:  GENERAL: NAD, well-groomed, well-developed  HEAD:  Atraumatic, Normocephalic  EYES: EOMI, conjunctiva and sclera clear  ENMT: Moist mucous membranes, Good dentition, no thrush  NECK: Supple, No JVD  CHEST/LUNG: Clear to auscultation bilaterally, good air entry, non-labored breathing  HEART: RRR; S1/S2, No murmur  ABDOMEN: Soft, Nontender, Nondistended; Bowel sounds present  VASCULAR: Normal pulses, Normal capillary refill  EXTREMITIES: No calf tenderness, No cyanosis, No edema  LYMPH: Normal; No lymphadenopathy noted  SKIN: Warm, Intact  PSYCH: Normal mood, Normal affect  NERVOUS SYSTEM:  A/O x3, Good concentration; CN 2-12 intact, No focal deficits    LABS:                        14.2   7.53  )-----------( 189      ( 11 Mar 2023 07:15 )             45.0     03-11    136  |  105  |  31  ----------------------------<  168  4.9   |  23  |  1.64    Ca    10.3      11 Mar 2023 07:15  Phos  3.2     03-11  Mg     2.4     03-11    TPro  7.4  /  Alb  3.3  /  TBili  0.2  /  DBili  x   /  AST  22  /  ALT  24  /  AlkPhos  85  03-11      PT/INR - ( 10 Mar 2023 11:33 )   PT: 13.7 sec;   INR: 1.18 ratio         PTT - ( 10 Mar 2023 11:33 )  PTT:44.3 sec    CARDIAC MARKERS ( 11 Mar 2023 07:15 )  x     / 17.5 ng/L / x     / x     / x      CARDIAC MARKERS ( 10 Mar 2023 12:34 )  x     / 16.0 ng/L / x     / x     / x      CARDIAC MARKERS ( 10 Mar 2023 11:33 )  x     / 15.6 ng/L / x     / x     / x          03-11 Chol 122 mg/dL LDL -- HDL 35 mg/dL Trig 123 mg/dL              POCT Blood Glucose.: 142 mg/dL (11 Mar 2023 12:05)  POCT Blood Glucose.: 123 mg/dL (11 Mar 2023 07:43)  POCT Blood Glucose.: 107 mg/dL (10 Mar 2023 21:19)  POCT Blood Glucose.: 71 mg/dL (10 Mar 2023 16:28)          COVID-19 PCR: NotDetec (03-10-23 @ 11:33)      RADIOLOGY & ADDITIONAL TESTS:    Care Discussed with Consultants/Other Providers:    Patient is a 78y old  Male who presents with a chief complaint of Chest pain (10 Mar 2023 15:34)    Patient seen and examined at bedside. He has no complaints, chest pain has resolved.    ALLERGIES:  No Known Allergies    MEDICATIONS  (STANDING):  amLODIPine   Tablet 5 milliGRAM(s) Oral daily  aspirin  chewable 81 milliGRAM(s) Oral daily  atorvastatin 80 milliGRAM(s) Oral at bedtime  cholecalciferol 1000 Unit(s) Oral daily  cyanocobalamin 1000 MICROGram(s) Oral daily  dextrose 5%. 1000 milliLiter(s) (50 mL/Hr) IV Continuous <Continuous>  dextrose 5%. 1000 milliLiter(s) (100 mL/Hr) IV Continuous <Continuous>  dextrose 50% Injectable 25 Gram(s) IV Push once  dextrose 50% Injectable 12.5 Gram(s) IV Push once  dextrose 50% Injectable 25 Gram(s) IV Push once  enoxaparin Injectable 40 milliGRAM(s) SubCutaneous every 24 hours  furosemide    Tablet 40 milliGRAM(s) Oral daily  gabapentin 600 milliGRAM(s) Oral three times a day  glucagon  Injectable 1 milliGRAM(s) IntraMuscular once  insulin glargine Injectable (LANTUS) 15 Unit(s) SubCutaneous every morning  insulin glargine Injectable (LANTUS) 15 Unit(s) SubCutaneous at bedtime  insulin lispro (ADMELOG) corrective regimen sliding scale   SubCutaneous three times a day before meals  insulin lispro (ADMELOG) corrective regimen sliding scale   SubCutaneous at bedtime  lactobacillus acidophilus 1 Tablet(s) Oral daily  metoprolol succinate ER 50 milliGRAM(s) Oral daily  spironolactone 25 milliGRAM(s) Oral daily  tiotropium 2.5 MICROgram(s) Inhaler 2 Puff(s) Inhalation daily    MEDICATIONS  (PRN):  acetaminophen     Tablet .. 650 milliGRAM(s) Oral every 6 hours PRN Temp greater or equal to 38C (100.4F), Mild Pain (1 - 3)  albuterol    90 MICROgram(s) HFA Inhaler 2 Puff(s) Inhalation every 6 hours PRN Shortness of Breath and/or Wheezing  dextrose Oral Gel 15 Gram(s) Oral once PRN Blood Glucose LESS THAN 70 milliGRAM(s)/deciliter  melatonin 3 milliGRAM(s) Oral at bedtime PRN Insomnia    Vital Signs Last 24 Hrs  T(F): 97.6 (11 Mar 2023 05:38), Max: 97.6 (11 Mar 2023 05:38)  HR: 52 (11 Mar 2023 05:38) (51 - 53)  BP: 148/70 (11 Mar 2023 05:38) (135/55 - 148/70)  RR: 17 (11 Mar 2023 05:38) (16 - 17)  SpO2: 94% (11 Mar 2023 05:38) (94% - 98%)  I&O's Summary    10 Mar 2023 07:01  -  11 Mar 2023 07:00  --------------------------------------------------------  IN: 300 mL / OUT: 0 mL / NET: 300 mL    PHYSICAL EXAM:  GENERAL: NAD  HEAD:  Atraumatic, Normocephalic  EYES: EOMI  NECK: Supple  CHEST/LUNG: CTA  HEART: RRR; S1/S2  ABDOMEN: Soft, NT  VASCULAR: Normal pulses, Normal capillary refill  EXTREMITIES: No calf tenderness, No cyanosis, No edema  LYMPH: Normal; No lymphadenopathy noted  SKIN: Warm, Intact  PSYCH: Normal mood, Normal affect  NERVOUS SYSTEM:  A/O x3, No focal deficits    LABS:                        14.2   7.53  )-----------( 189      ( 11 Mar 2023 07:15 )             45.0     03-11    136  |  105  |  31  ----------------------------<  168  4.9   |  23  |  1.64    Ca    10.3      11 Mar 2023 07:15  Phos  3.2     03-11  Mg     2.4     03-11    TPro  7.4  /  Alb  3.3  /  TBili  0.2  /  DBili  x   /  AST  22  /  ALT  24  /  AlkPhos  85  03-11      PT/INR - ( 10 Mar 2023 11:33 )   PT: 13.7 sec;   INR: 1.18 ratio         PTT - ( 10 Mar 2023 11:33 )  PTT:44.3 sec    CARDIAC MARKERS ( 11 Mar 2023 07:15 )  x     / 17.5 ng/L / x     / x     / x      CARDIAC MARKERS ( 10 Mar 2023 12:34 )  x     / 16.0 ng/L / x     / x     / x      CARDIAC MARKERS ( 10 Mar 2023 11:33 )  x     / 15.6 ng/L / x     / x     / x          03-11 Chol 122 mg/dL LDL -- HDL 35 mg/dL Trig 123 mg/dL              POCT Blood Glucose.: 142 mg/dL (11 Mar 2023 12:05)  POCT Blood Glucose.: 123 mg/dL (11 Mar 2023 07:43)  POCT Blood Glucose.: 107 mg/dL (10 Mar 2023 21:19)  POCT Blood Glucose.: 71 mg/dL (10 Mar 2023 16:28)          COVID-19 PCR: NotDetec (03-10-23 @ 11:33)      RADIOLOGY & ADDITIONAL TESTS:    Care Discussed with Consultants/Other Providers:

## 2023-03-11 NOTE — PROGRESS NOTE ADULT - ASSESSMENT
79 yo man with history of CAD s/p CABG, Diabetes Mellitus II, Hypertension, Hyperlipidemia, and history CHF comes to ED with 30 minutes of sharp chest pain, now resolved.  Patient states cardiology recommending stress test.    Acute Chest pain R/O ACS  History CAD s.p CABG  History HFrEF  - Continue Telemonitoring while in observation  - Continue ASA and atorvastatin 80mg  - Continue metoprolol, lasix,   - Hold ACEi due to DOUGLAS  - Cardiac Enzyme neg x 2  -Cardiology consult pending,     Emphysema - Chronic  - Seen on CT Chest (3/10)   - Continue albuterol PRN and Spiriva    Diabetes Mellitus II - Controlled  - Home meds: Alogliptin, empagliflozin, metformin BID, Novolin 70/30 60 units in AM, 25 units in PM  - F/U A1c  - Continue with Lantus 15 units sc in AM and in PM for now  - Hypoglycemia protocol and insulin sliding scale at pre-meal and at night  - Blood glucose goal 100-180  - Monitor BUN/Cr and electrolytes    Hypertension  - Continue amlodipine, metoprolol, and lasix  - Monitor vital signs     Hyperlipidemia   - Continue statin and ezetimibe    DOUGLAS on CKD IIIa  - Baseline SCr near 1.4  - Encourage oral intake  - Avoid nephrotoxic agents so holding lisinopril 40mg    DVT Prophylaxis with Lovenox

## 2023-03-11 NOTE — PROGRESS NOTE ADULT - SUBJECTIVE AND OBJECTIVE BOX
Follow up for  SUBJ:    no current cardiac complaints    PMH  HTN (hypertension)    HLD (hyperlipidemia)    Diabetes mellitus, type 2    CAD (coronary artery disease)    Chronic CHF    CKD (chronic kidney disease)        MEDICATIONS  (STANDING):  amLODIPine   Tablet 5 milliGRAM(s) Oral daily  aspirin  chewable 81 milliGRAM(s) Oral daily  atorvastatin 80 milliGRAM(s) Oral at bedtime  cholecalciferol 1000 Unit(s) Oral daily  cyanocobalamin 1000 MICROGram(s) Oral daily  dextrose 5%. 1000 milliLiter(s) (50 mL/Hr) IV Continuous <Continuous>  dextrose 5%. 1000 milliLiter(s) (100 mL/Hr) IV Continuous <Continuous>  dextrose 50% Injectable 25 Gram(s) IV Push once  dextrose 50% Injectable 12.5 Gram(s) IV Push once  dextrose 50% Injectable 25 Gram(s) IV Push once  enoxaparin Injectable 40 milliGRAM(s) SubCutaneous every 24 hours  furosemide    Tablet 40 milliGRAM(s) Oral daily  gabapentin 600 milliGRAM(s) Oral three times a day  glucagon  Injectable 1 milliGRAM(s) IntraMuscular once  insulin glargine Injectable (LANTUS) 15 Unit(s) SubCutaneous every morning  insulin glargine Injectable (LANTUS) 15 Unit(s) SubCutaneous at bedtime  insulin lispro (ADMELOG) corrective regimen sliding scale   SubCutaneous three times a day before meals  insulin lispro (ADMELOG) corrective regimen sliding scale   SubCutaneous at bedtime  lactobacillus acidophilus 1 Tablet(s) Oral daily  metoprolol succinate ER 50 milliGRAM(s) Oral daily  spironolactone 25 milliGRAM(s) Oral daily  tiotropium 2.5 MICROgram(s) Inhaler 2 Puff(s) Inhalation daily    MEDICATIONS  (PRN):  acetaminophen     Tablet .. 650 milliGRAM(s) Oral every 6 hours PRN Temp greater or equal to 38C (100.4F), Mild Pain (1 - 3)  albuterol    90 MICROgram(s) HFA Inhaler 2 Puff(s) Inhalation every 6 hours PRN Shortness of Breath and/or Wheezing  dextrose Oral Gel 15 Gram(s) Oral once PRN Blood Glucose LESS THAN 70 milliGRAM(s)/deciliter  melatonin 3 milliGRAM(s) Oral at bedtime PRN Insomnia        PHYSICAL EXAM:  Vital Signs Last 24 Hrs  T(C): 36.4 (11 Mar 2023 13:37), Max: 36.4 (11 Mar 2023 05:38)  T(F): 97.5 (11 Mar 2023 13:37), Max: 97.6 (11 Mar 2023 05:38)  HR: 55 (11 Mar 2023 13:37) (52 - 55)  BP: 133/70 (11 Mar 2023 13:37) (133/70 - 148/70)  BP(mean): --  RR: 15 (11 Mar 2023 13:37) (15 - 17)  SpO2: 98% (11 Mar 2023 13:37) (94% - 98%)    Parameters below as of 11 Mar 2023 13:37  Patient On (Oxygen Delivery Method): room air        GENERAL: NAD, well-groomed, well-developed  HEAD:  Atraumatic, Normocephalic  EYES: EOMI, PERRLA, conjunctiva and sclera clear  ENT: Moist mucous membranes,  NECK: Supple, No JVD, no bruits  CHEST/LUNG: Clear to percussion bilaterally; No rales, rhonchi, wheezing, or rubs  HEART: Regular rate and rhythm; No murmurs, rubs, or gallops PMI non displaced.  ABDOMEN: Soft, Nontender, Nondistended; Bowel sounds present  EXTREMITIES:  2+ Peripheral Pulses, No clubbing, cyanosis, or edema  SKIN: No rashes or lesions  NERVOUS SYSTEM:  Cranial Nerves II-XII intact      TELEMETRY:    ECG:  ECHO:    LABS:                        14.2   7.53  )-----------( 189      ( 11 Mar 2023 07:15 )             45.0     03-11    136  |  105  |  31<H>  ----------------------------<  168<H>  4.9   |  23  |  1.64<H>    Ca    10.3      11 Mar 2023 07:15  Phos  3.2     03-11  Mg     2.4     03-11    TPro  7.4  /  Alb  3.3  /  TBili  0.2  /  DBili  x   /  AST  22  /  ALT  24  /  AlkPhos  85  03-11        PT/INR - ( 10 Mar 2023 11:33 )   PT: 13.7 sec;   INR: 1.18 ratio         PTT - ( 10 Mar 2023 11:33 )  PTT:44.3 sec    I&O's Summary    10 Mar 2023 07:01  -  11 Mar 2023 07:00  --------------------------------------------------------  IN: 300 mL / OUT: 0 mL / NET: 300 mL      BNP    RADIOLOGY & ADDITIONAL STUDIES:    ECHO:      chest pains  Pleasant -American man with the mild renal failure ejection fraction 50 to 55% which is borderline normal status post coronary bypass approximately 15 years ago now with recurrent chest pain followed mostly at the [facility] troponins 15.6/16/17.5 . emphysema a follow-up CT scan is recommended in 6-month. creatinine 1.64. given a history of prior known coronary disease and recurrent angina patient was offered inpatient nuclear stress testing which he accepts pharmacologic nuclear stress test is planned for Monday patient has COPD but no asthma would ask pulmonary to evaluate for candidacy for adenosine. his son has been included in the conversation. [facility] requested [facility]

## 2023-03-12 ENCOUNTER — INPATIENT (INPATIENT)
Facility: HOSPITAL | Age: 78
LOS: 0 days | Discharge: HOME | DRG: 313 | End: 2023-03-13
Attending: FAMILY MEDICINE | Admitting: FAMILY MEDICINE
Payer: MEDICARE

## 2023-03-12 DIAGNOSIS — R07.9 CHEST PAIN, UNSPECIFIED: ICD-10-CM

## 2023-03-12 DIAGNOSIS — Z98.890 OTHER SPECIFIED POSTPROCEDURAL STATES: Chronic | ICD-10-CM

## 2023-03-12 DIAGNOSIS — Z90.49 ACQUIRED ABSENCE OF OTHER SPECIFIED PARTS OF DIGESTIVE TRACT: Chronic | ICD-10-CM

## 2023-03-12 DIAGNOSIS — Z95.1 PRESENCE OF AORTOCORONARY BYPASS GRAFT: Chronic | ICD-10-CM

## 2023-03-12 LAB
ALBUMIN SERPL ELPH-MCNC: 3.2 G/DL — LOW (ref 3.3–5)
ALP SERPL-CCNC: 96 U/L — SIGNIFICANT CHANGE UP (ref 40–120)
ALT FLD-CCNC: 21 U/L — SIGNIFICANT CHANGE UP (ref 10–45)
ANION GAP SERPL CALC-SCNC: 8 MMOL/L — SIGNIFICANT CHANGE UP (ref 5–17)
AST SERPL-CCNC: 16 U/L — SIGNIFICANT CHANGE UP (ref 10–40)
BILIRUB SERPL-MCNC: 0.2 MG/DL — SIGNIFICANT CHANGE UP (ref 0.2–1.2)
BUN SERPL-MCNC: 29 MG/DL — HIGH (ref 7–23)
CALCIUM SERPL-MCNC: 10.4 MG/DL — SIGNIFICANT CHANGE UP (ref 8.4–10.5)
CHLORIDE SERPL-SCNC: 106 MMOL/L — SIGNIFICANT CHANGE UP (ref 96–108)
CO2 SERPL-SCNC: 27 MMOL/L — SIGNIFICANT CHANGE UP (ref 22–31)
CREAT SERPL-MCNC: 1.91 MG/DL — HIGH (ref 0.5–1.3)
EGFR: 36 ML/MIN/1.73M2 — LOW
GLUCOSE BLDC GLUCOMTR-MCNC: 140 MG/DL — HIGH (ref 70–99)
GLUCOSE BLDC GLUCOMTR-MCNC: 157 MG/DL — HIGH (ref 70–99)
GLUCOSE BLDC GLUCOMTR-MCNC: 159 MG/DL — HIGH (ref 70–99)
GLUCOSE BLDC GLUCOMTR-MCNC: 175 MG/DL — HIGH (ref 70–99)
GLUCOSE BLDC GLUCOMTR-MCNC: 180 MG/DL — HIGH (ref 70–99)
GLUCOSE SERPL-MCNC: 211 MG/DL — HIGH (ref 70–99)
HCT VFR BLD CALC: 44.8 % — SIGNIFICANT CHANGE UP (ref 39–50)
HGB BLD-MCNC: 14.6 G/DL — SIGNIFICANT CHANGE UP (ref 13–17)
MCHC RBC-ENTMCNC: 30.8 PG — SIGNIFICANT CHANGE UP (ref 27–34)
MCHC RBC-ENTMCNC: 32.6 GM/DL — SIGNIFICANT CHANGE UP (ref 32–36)
MCV RBC AUTO: 94.5 FL — SIGNIFICANT CHANGE UP (ref 80–100)
NRBC # BLD: 0 /100 WBCS — SIGNIFICANT CHANGE UP (ref 0–0)
PLATELET # BLD AUTO: 177 K/UL — SIGNIFICANT CHANGE UP (ref 150–400)
POTASSIUM SERPL-MCNC: 5.2 MMOL/L — SIGNIFICANT CHANGE UP (ref 3.5–5.3)
POTASSIUM SERPL-SCNC: 5.2 MMOL/L — SIGNIFICANT CHANGE UP (ref 3.5–5.3)
PROT SERPL-MCNC: 7.3 G/DL — SIGNIFICANT CHANGE UP (ref 6–8.3)
RBC # BLD: 4.74 M/UL — SIGNIFICANT CHANGE UP (ref 4.2–5.8)
RBC # FLD: 13.2 % — SIGNIFICANT CHANGE UP (ref 10.3–14.5)
SODIUM SERPL-SCNC: 141 MMOL/L — SIGNIFICANT CHANGE UP (ref 135–145)
WBC # BLD: 6.74 K/UL — SIGNIFICANT CHANGE UP (ref 3.8–10.5)
WBC # FLD AUTO: 6.74 K/UL — SIGNIFICANT CHANGE UP (ref 3.8–10.5)

## 2023-03-12 PROCEDURE — 99233 SBSQ HOSP IP/OBS HIGH 50: CPT

## 2023-03-12 PROCEDURE — 99232 SBSQ HOSP IP/OBS MODERATE 35: CPT

## 2023-03-12 RX ORDER — INSULIN NPH HUM/REG INSULIN HM 70-30/ML
0 VIAL (ML) SUBCUTANEOUS
Qty: 0 | Refills: 0 | DISCHARGE

## 2023-03-12 RX ORDER — REGADENOSON 0.08 MG/ML
0.4 INJECTION, SOLUTION INTRAVENOUS ONCE
Refills: 0 | Status: COMPLETED | OUTPATIENT
Start: 2023-03-12 | End: 2023-03-13

## 2023-03-12 RX ORDER — METOPROLOL TARTRATE 50 MG
1 TABLET ORAL
Qty: 0 | Refills: 0 | DISCHARGE

## 2023-03-12 RX ORDER — INSULIN LISPRO 100/ML
VIAL (ML) SUBCUTANEOUS EVERY 6 HOURS
Refills: 0 | Status: DISCONTINUED | OUTPATIENT
Start: 2023-03-13 | End: 2023-03-13

## 2023-03-12 RX ORDER — POLYETHYLENE GLYCOL 3350 17 G/17G
17 POWDER, FOR SOLUTION ORAL DAILY
Refills: 0 | Status: DISCONTINUED | OUTPATIENT
Start: 2023-03-12 | End: 2023-03-13

## 2023-03-12 RX ORDER — FUROSEMIDE 40 MG
20 TABLET ORAL DAILY
Refills: 0 | Status: DISCONTINUED | OUTPATIENT
Start: 2023-03-12 | End: 2023-03-13

## 2023-03-12 RX ORDER — INSULIN NPH HUM/REG INSULIN HM 70-30/ML
60 VIAL (ML) SUBCUTANEOUS
Qty: 0 | Refills: 0 | DISCHARGE

## 2023-03-12 RX ADMIN — Medication 50 MILLIGRAM(S): at 05:45

## 2023-03-12 RX ADMIN — SPIRONOLACTONE 25 MILLIGRAM(S): 25 TABLET, FILM COATED ORAL at 05:46

## 2023-03-12 RX ADMIN — AMLODIPINE BESYLATE 5 MILLIGRAM(S): 2.5 TABLET ORAL at 05:45

## 2023-03-12 RX ADMIN — INSULIN GLARGINE 15 UNIT(S): 100 INJECTION, SOLUTION SUBCUTANEOUS at 08:09

## 2023-03-12 RX ADMIN — PREGABALIN 1000 MICROGRAM(S): 225 CAPSULE ORAL at 12:55

## 2023-03-12 RX ADMIN — Medication 1 TABLET(S): at 12:55

## 2023-03-12 RX ADMIN — Medication 1000 UNIT(S): at 12:56

## 2023-03-12 RX ADMIN — Medication 81 MILLIGRAM(S): at 12:55

## 2023-03-12 RX ADMIN — Medication 2: at 08:08

## 2023-03-12 RX ADMIN — ATORVASTATIN CALCIUM 80 MILLIGRAM(S): 80 TABLET, FILM COATED ORAL at 21:13

## 2023-03-12 RX ADMIN — GABAPENTIN 600 MILLIGRAM(S): 400 CAPSULE ORAL at 13:30

## 2023-03-12 RX ADMIN — Medication 40 MILLIGRAM(S): at 05:45

## 2023-03-12 RX ADMIN — POLYETHYLENE GLYCOL 3350 17 GRAM(S): 17 POWDER, FOR SOLUTION ORAL at 13:31

## 2023-03-12 RX ADMIN — GABAPENTIN 600 MILLIGRAM(S): 400 CAPSULE ORAL at 05:45

## 2023-03-12 RX ADMIN — TIOTROPIUM BROMIDE 2 PUFF(S): 18 CAPSULE ORAL; RESPIRATORY (INHALATION) at 08:40

## 2023-03-12 RX ADMIN — GABAPENTIN 600 MILLIGRAM(S): 400 CAPSULE ORAL at 21:13

## 2023-03-12 NOTE — PROGRESS NOTE ADULT - ASSESSMENT
79 yo man with history of CAD s/p CABG, Diabetes Mellitus II, Hypertension, Hyperlipidemia, and history CHF comes to ED with 30 minutes of sharp chest pain, now resolved.  Patient states cardiology recommending stress test.    Acute Chest pain R/O ACS  History CAD s.p CABG  History HFrEF  - Continue Telemonitoring while in observation  - Continue ASA and atorvastatin 80mg  - Continue metoprolol, lasix,   - Hold ACEi due to DOUGLAS  - Cardiac Enzyme neg x 2  -Cardiology recs apprec Dr Hung: NPO at [place], adenosine stress test tomorrow, restrict diet today NO caffeine or chocolate 12 hours prior to exam will reinforce with patient and nurse    Emphysema - Chronic  - Seen on CT Chest (3/10) ; incidental lung nodule in RLL also noted, pt will be reinforced to follow up in 6 months with CT as outpt  - Continue albuterol PRN and Spiriva    Diabetes Mellitus II - Controlled  - Home meds: Alogliptin, empagliflozin, metformin BID, Novolin 70/30 60 units in AM, 25 units in PM  - F/U A1c  - will hold lantus for now as npo at [place]; ldiss at [place] and q6hrs fsbs checks at [place], give ivf if clincally needed and adjust iss as clinically warrante  - Hypoglycemia protocol and insulin sliding scale at pre-meal and at night  - Blood glucose goal 100-180  - Monitor BUN/Cr and electrolytes    Hypertension  - Continue amlodipine, metoprolol, and lasix  - Monitor vital signs     Hyperlipidemia   - Continue statin and ezetimibe    DOUGLAS on CKD IIIa  - Baseline SCr near 1.4, today 1.9, will decrease lasix 20mg and monitor   - Encourage oral intake  - Avoid nephrotoxic agents so holding lisinopril 40mg    DVT Prophylaxis with Lovenox

## 2023-03-12 NOTE — PROGRESS NOTE ADULT - SUBJECTIVE AND OBJECTIVE BOX
Patient is a 78y old  Male who presents with a chief complaint of Chest pain (12 Mar 2023 12:12)      INTERVAL HPI: Pt seen and examined. States he is feeling well, deneis any acute complaints, recalls that in his last stress test there was mention of poss need for stent.     OVERNIGHT EVENTS: none noted  T(F): 97.5 (03-12-23 @ 05:27), Max: 97.7 (03-11-23 @ 21:06)  HR: 50 (03-12-23 @ 08:41) (50 - 55)  BP: 166/77 (03-12-23 @ 05:27) (123/52 - 166/77)  RR: 15 (03-12-23 @ 05:27) (15 - 16)  SpO2: 98% (03-12-23 @ 08:41) (96% - 98%)  I&O's Summary        PHYSICAL EXAM:  GENERAL: NAD  HEAD:  Atraumatic, Normocephalic  EYES: EOMI  NECK: Supple  CHEST/LUNG: CTA  HEART: RRR; S1/S2  ABDOMEN: Soft, NT  VASCULAR: Normal pulses, Normal capillary refill  EXTREMITIES: No calf tenderness, No cyanosis, No edema  SKIN: Warm, Intact  PSYCH: Normal mood, Normal affect  NERVOUS SYSTEM:  A/O x3, No focal deficits    LABS:                        14.6   6.74  )-----------( 177      ( 12 Mar 2023 06:45 )             44.8     03-12    141  |  106  |  29<H>  ----------------------------<  211<H>  5.2   |  27  |  1.91<H>    Ca    10.4      12 Mar 2023 06:45  Phos  3.2     03-11  Mg     2.4     03-11    TPro  7.3  /  Alb  3.2<L>  /  TBili  0.2  /  DBili  x   /  AST  16  /  ALT  21  /  AlkPhos  96  03-12        CAPILLARY BLOOD GLUCOSE      POCT Blood Glucose.: 157 mg/dL (12 Mar 2023 11:55)  POCT Blood Glucose.: 180 mg/dL (12 Mar 2023 07:53)  POCT Blood Glucose.: 175 mg/dL (12 Mar 2023 05:30)  POCT Blood Glucose.: 151 mg/dL (11 Mar 2023 21:30)  POCT Blood Glucose.: 137 mg/dL (11 Mar 2023 16:44)              MEDICATIONS  (STANDING):  amLODIPine   Tablet 5 milliGRAM(s) Oral daily  aspirin  chewable 81 milliGRAM(s) Oral daily  atorvastatin 80 milliGRAM(s) Oral at bedtime  cholecalciferol 1000 Unit(s) Oral daily  cyanocobalamin 1000 MICROGram(s) Oral daily  dextrose 5%. 1000 milliLiter(s) (50 mL/Hr) IV Continuous <Continuous>  dextrose 5%. 1000 milliLiter(s) (100 mL/Hr) IV Continuous <Continuous>  dextrose 50% Injectable 25 Gram(s) IV Push once  dextrose 50% Injectable 12.5 Gram(s) IV Push once  dextrose 50% Injectable 25 Gram(s) IV Push once  enoxaparin Injectable 40 milliGRAM(s) SubCutaneous every 24 hours  furosemide    Tablet 40 milliGRAM(s) Oral daily  gabapentin 600 milliGRAM(s) Oral three times a day  glucagon  Injectable 1 milliGRAM(s) IntraMuscular once  insulin glargine Injectable (LANTUS) 15 Unit(s) SubCutaneous every morning  insulin glargine Injectable (LANTUS) 15 Unit(s) SubCutaneous at bedtime  insulin lispro (ADMELOG) corrective regimen sliding scale   SubCutaneous three times a day before meals  insulin lispro (ADMELOG) corrective regimen sliding scale   SubCutaneous at bedtime  lactobacillus acidophilus 1 Tablet(s) Oral daily  metoprolol succinate ER 50 milliGRAM(s) Oral daily  regadenoson Injectable 0.4 milliGRAM(s) IV Push once  spironolactone 25 milliGRAM(s) Oral daily  tiotropium 2.5 MICROgram(s) Inhaler 2 Puff(s) Inhalation daily    MEDICATIONS  (PRN):  acetaminophen     Tablet .. 650 milliGRAM(s) Oral every 6 hours PRN Temp greater or equal to 38C (100.4F), Mild Pain (1 - 3)  albuterol    90 MICROgram(s) HFA Inhaler 2 Puff(s) Inhalation every 6 hours PRN Shortness of Breath and/or Wheezing  dextrose Oral Gel 15 Gram(s) Oral once PRN Blood Glucose LESS THAN 70 milliGRAM(s)/deciliter  melatonin 3 milliGRAM(s) Oral at bedtime PRN Insomnia

## 2023-03-12 NOTE — CONSULT NOTE ADULT - SUBJECTIVE AND OBJECTIVE BOX
PULMONARY CONSULT  Location of Patient :  [facility] D1 ( TELE)  Attending :Jarrod Grijalva    Initial HPI on admission:  HPI:  79 yo man with history of CAD s/p CABG, Diabetes Mellitus II, Hypertension, Hyperlipidemia, and history CHF comes to ED with 30 minutes of sharp chest pain around 11 am. Patient was sitting down when chest pain started on lower left side of chest. It was non-radiating and sharp. The pain resolved while in the ED. He denies any alleviating or exacerbating factors. He denies fever, chills, shortness of breath, nausea, vomiting, palpitations, dizziness, or change in vision.    In the ED, Temp 97.7, HR 76, BP 97/53, RR 18, O2 Sat 98%. Troponins were neg x 2. EKG with T wave abnormality. Admitted to rule out ACS.  (10 Mar 2023 13:59)      BRIEF HOSPITAL COURSE: ***    PAST MEDICAL & SURGICAL HISTORY:  HTN (hypertension)  HLD (hyperlipidemia)  Diabetes mellitus, type 2  CAD (coronary artery disease)  Chronic CHF  CKD (chronic kidney disease)  S/P CABG x 1  History of appendectomy  History of cholecystectomy  H/O abdominal surgery for &#x27;stomach cancer&#x27;        Allergies  No Known Allergies       FAMILY HISTORY:  FH: type 2 diabetes (Mother, Sibling)  FHx: kidney failure (Sibling)      Social history: Social History:  Former smoker of cigarettes (stopped in Jan 2005), drinks alcohol rarely, and denies illicit drug use (10 Mar 2023 13:59)       Smoking:     Drinking:     Drug use:    Review of Systems: as stated above    CONSTITUTIONAL: No fever, No chills, No fatigue  EYES: No eye pain, No visual disturbances, No discharge  ENMT:  No difficulty hearing, No tinnitus, No vertigo; No sinus or throat pain  NECK: No pain, No stiffness  RESPIRATORY: No Cough, No SOB, No Secretions  CARDIOVASCULAR: No chest pain, No palpitations, No dizziness, or No leg swelling  GASTROINTESTINAL: No abdominal or epigastric pain. No nausea, No vomiting, No hematemesis; No diarrhea, No constipation. No melena, No hematochezia.  GENITOURINARY: No dysuria, No frequency, No hematuria, No incontinence  NEUROLOGICAL: No headaches, No memory loss, No loss of strength, No numbness, No tremors  SKIN: No itching, No burning, No rashes, No lesions   MUSCULOSKELETAL: No joint pain or swelling; No muscle, back, No extremity pain  PSYCHIATRIC: No depression, No anxiety, No mood swings, No difficulty sleeping      Medications:  MEDICATIONS  (STANDING):  amLODIPine   Tablet 5 milliGRAM(s) Oral daily  aspirin  chewable 81 milliGRAM(s) Oral daily  atorvastatin 80 milliGRAM(s) Oral at bedtime  cholecalciferol 1000 Unit(s) Oral daily  cyanocobalamin 1000 MICROGram(s) Oral daily  dextrose 5%. 1000 milliLiter(s) (50 mL/Hr) IV Continuous <Continuous>  dextrose 5%. 1000 milliLiter(s) (100 mL/Hr) IV Continuous <Continuous>  dextrose 50% Injectable 25 Gram(s) IV Push once  dextrose 50% Injectable 12.5 Gram(s) IV Push once  dextrose 50% Injectable 25 Gram(s) IV Push once  enoxaparin Injectable 40 milliGRAM(s) SubCutaneous every 24 hours  furosemide    Tablet 40 milliGRAM(s) Oral daily  gabapentin 600 milliGRAM(s) Oral three times a day  glucagon  Injectable 1 milliGRAM(s) IntraMuscular once  insulin lispro (ADMELOG) corrective regimen sliding scale   SubCutaneous three times a day before meals  insulin lispro (ADMELOG) corrective regimen sliding scale   SubCutaneous at bedtime  lactobacillus acidophilus 1 Tablet(s) Oral daily  metoprolol succinate ER 50 milliGRAM(s) Oral daily  regadenoson Injectable 0.4 milliGRAM(s) IV Push once  spironolactone 25 milliGRAM(s) Oral daily  tiotropium 2.5 MICROgram(s) Inhaler 2 Puff(s) Inhalation daily    MEDICATIONS  (PRN):  acetaminophen     Tablet .. 650 milliGRAM(s) Oral every 6 hours PRN Temp greater or equal to 38C (100.4F), Mild Pain (1 - 3)  albuterol    90 MICROgram(s) HFA Inhaler 2 Puff(s) Inhalation every 6 hours PRN Shortness of Breath and/or Wheezing  dextrose Oral Gel 15 Gram(s) Oral once PRN Blood Glucose LESS THAN 70 milliGRAM(s)/deciliter  melatonin 3 milliGRAM(s) Oral at bedtime PRN Insomnia      Antibiotics History      Heme Medications   aspirin  chewable 81 milliGRAM(s) Oral daily, 03-11-23 @ 00:00  enoxaparin Injectable 40 milliGRAM(s) SubCutaneous every 24 hours, 03-10-23 @ 16:17       Home Medications:  Last Order Reconciliation Date: 03-12-23 @ 08:51 (Admission Reconciliation)  albuterol 90 mcg/inh inhalation aerosol: 2 puff(s) inhaled every 6 hours, As Needed (03-12-23 @ 08:51)  Alogliptin 12.5 mg oral tablet: 1 tab(s) orally once a day (03-12-23 @ 08:51)  amLODIPine 5 mg oral tablet: 1 tab(s) orally once a day (03-12-23 @ 08:51)  aspirin 81 mg oral tablet: 1 tab(s) orally once a day (03-10-23 @ 14:08)  atorvastatin 80 mg oral tablet: 1 tab(s) orally once a day (03-12-23 @ 08:51)  Bacid (LAC) oral capsule: 1 cap(s) orally once a day (03-10-23 @ 14:08)  cholecalciferol 400 intl units (10 mcg) oral tablet: 2 tab(s) orally once a day (03-12-23 @ 08:51)  cyanocobalamin 1000 mcg oral tablet: 1 tab(s) orally once a day (03-10-23 @ 14:08)  empagliflozin 25 mg oral tablet: 1 tab(s) orally once a day (in the morning) (03-12-23 @ 08:51)  ezetimibe 10 mg oral tablet: 1 tab(s) orally once a day (03-12-23 @ 08:51)  furosemide 40 mg oral tablet: 1 tab(s) orally once a day (03-12-23 @ 08:51)  gabapentin 300 mg oral capsule: 2 cap(s) orally 3 times a day (03-10-23 @ 14:08)  lisinopril 40 mg oral tablet: 1 tab(s) orally once a day (03-10-23 @ 14:08)  metFORMIN 500 mg oral tablet: 2 tab(s) orally 2 times a day (03-12-23 @ 08:51)  metoprolol succinate 100 mg oral tablet, extended release: 0.5 tab(s) orally once a day (03-12-23 @ 08:51)  NovoLIN 70/30 FlexPen subcutaneous suspension: 25 unit(s) subcutaneous once a day (at bedtime) (03-12-23 @ 08:51)  NovoLIN 70/30 subcutaneous suspension: 60 unit(s) subcutaneous once a day (in the morning) (03-12-23 @ 08:51)  Spiriva 18 mcg inhalation capsule: 1 cap(s) inhaled once a day (03-10-23 @ 14:08)  spironolactone 25 mg oral tablet: 1 tab(s) orally once a day (03-10-23 @ 14:08)      LABS:                        14.6   6.74  )-----------( 177      ( 12 Mar 2023 06:45 )             44.8     03-12    141  |  106  |  29<H>  ----------------------------<  211<H>  5.2   |  27  |  1.91<H>    Ca    10.4      12 Mar 2023 06:45  Phos  3.2     03-11  Mg     2.4     03-11    TPro  7.3  /  Alb  3.2<L>  /  TBili  0.2  /  DBili  x   /  AST  16  /  ALT  21  /  AlkPhos  96  03-12       RADIOLOGY  CXR:  < from: Xray Chest 1 View- PORTABLE-Urgent (03.10.23 @ 11:59) >    ACC: [id number] EXAM:  XR CHEST PORTABLE URGENT 1V   ORDERED BY: SRIDHAR PINEDA     PROCEDURE DATE:  03/10/2023          INTERPRETATION:  AP erect chest on March 10, 2023 at 11:52 AM. Patient   has chest pain.    Heart normal for projection. Sternotomy again noted.    Lungs remain clear.    Chest is similar to January 24, 2022.    IMPRESSION: No acute finding or change.    --- End of Report ---          < end of copied text >      CT:  < from: CT Chest No Cont (03.10.23 @ 12:38) >    ACC: [id number] EXAM:  CT CHEST   ORDERED BY: SRIDHAR PINEDA     PROCEDURE DATE:  03/10/2023          INTERPRETATION:  INDICATION: left chest pain, abnormal chest radiograph  TECHNIQUE: Unenhanced CT of the chest. Coronal, sagittal, and MIP images  were reconstructed and reviewed.  COMPARISON: No prior chest CT. Portable chest radiograph 3/10/2023    FINDINGS:    AIRWAYS, LUNGS and PLEURA: Patent central airways. Centrilobular   emphysema. 6 mm oval nodule within right lower lobe. Small smooth wall   air cysts within left lower lobe. 3 mm subpleural triangular nodule   within left upper lobe. The lungs are otherwise clear. No pleural   effusion.    LYMPH NODES, MEDIASTINUM AND MANAS: No lymphadenopathy.    HEART AND VESSELS: Heart size is normal. No pericardial effusion.   Thoracic aorta and pulmonary artery normal in diameter. CABG.    VISUALIZED UPPER ABDOMEN: Nodular thickening/hypertrophy of the left   adrenal gland. Aortic calcification. Cholecystectomy.    CHEST WALL AND BONES: The bones are dense. Healed sternotomy.   Gynecomastia.    LOWER NECK: Within normal limits.    IMPRESSION:    Emphysema. 6 mm right lower lobe nodule. Recommend follow-up chest CT in   6 months to determine stability.    --- End of Report ---      < end of copied text >    ECHO:  < from: TTE Echo Complete w/o Contrast w/ Doppler (03.11.23 @ 08:18) >  Summary:   1. Left ventricular ejection fraction, by visual estimation, is 50 to 55%.   2. Technically fair study.   3. Normal global left ventricular systolic function.   4. Spectral Doppler shows restrictive pattern of left ventricular  myocardial filling (Grade III diastolic dysfunction).   5. Moderately enlarged left atrium.   6. Normal right atrial size.   7. Mild to moderate mitral valve regurgitation.   8. Moderate thickening and calcification of the anterior and posterior mitral valve leaflets.   9. Mild tricuspid regurgitation.  10. Mild aortic valve stenosis.  11. Calcification of the aortic annulus.  12. Pulmonary hypertension is absent.  13. When compared with an echo 1/25/22 there is probably no significant interval change.  14. LA volume Index is 30.5 ml/m² ml/m2.    [id number] Bayron Hung , Electronically signed on 3/11/2023 at   10:06:50 AM    < end of copied text >      VITALS:  T(C): 36.4 (03-12-23 @ 05:27), Max: 36.5 (03-11-23 @ 21:06)  T(F): 97.5 (03-12-23 @ 05:27), Max: 97.7 (03-11-23 @ 21:06)  HR: 50 (03-12-23 @ 08:41) (50 - 55)  BP: 166/77 (03-12-23 @ 05:27) (123/52 - 166/77)  BP(mean): --  ABP: --  ABP(mean): --  RR: 15 (03-12-23 @ 05:27) (15 - 16)  SpO2: 98% (03-12-23 @ 08:41) (96% - 98%)  CVP(mm Hg): --  CVP(cm H2O): --    Ins and Outs       Height (cm): 180.3 (03-10-23 @ 16:29)  Weight (kg): 98.4 (03-10-23 @ 11:21)  BMI (kg/m2): 30.3 (03-10-23 @ 16:29)        I&O's Detail      Physical Examination:  GENERAL:               Alert, Oriented, No acute distress.    HEENT:                    Pupils equal, reactive to light.  Symmetric. No JVD, Moist MM  PULM:                     Bilateral air entry, Clear to auscultation bilaterally, no significant sputum production, No Rales, No Rhonchi, No Wheezing  CVS:                         S1, S2,  No Murmur  ABD:                        Soft, nondistended, nontender, normoactive bowel sounds,   EXT:                         No edema, nontender, No Cyanosis or Clubbing   Vascular:                Warm Extremities, Normal Capillary refill, Normal Distal Pulses  SKIN:                       Warm and well perfused, no rashes noted.   NEURO:                  Alert, oriented, interactive, nonfocal, follows commands  PSYC:                      Calm, + Insight.     PULMONARY CONSULT  Location of Patient :  [facility] D1 ( TELE)  Attending :Jarrod Grijalva    Initial HPI on admission:  HPI:  77 yo man with history of Ex smoker with COPD, h/o lung nodule. CAD s/p CABG, Diabetes Mellitus II, Hypertension, Hyperlipidemia, and history CHF comes to ED with 30 minutes of sharp chest pain around 11 am. Patient was sitting down when chest pain started on lower left side of chest. It was non-radiating and sharp. The pain resolved while in the ED. He denies any alleviating or exacerbating factors. He denies fever, chills, shortness of breath, nausea, vomiting, palpitations, dizziness, or change in vision.    In the ED, Temp 97.7, HR 76, BP 97/53, RR 18, O2 Sat 98%. Troponin were neg x 2. EKG with T wave abnormality. Admitted to rule out ACS.  (10 Mar 2023 13:59)  BRIEF HOSPITAL COURSE:   patient states since admission chest pain/pressure resolved  initially was midsternal non radiating sharp stabbing pain  states in past his cardio told him may need to get a stent    discussed abnormal CT chest with lung nodule states follows up at [facility], states its old, unable to specify how old but was able to confirmed size of 6 mm   states copd is stable uses spiriva daily and albuterol as needed  no recent copd complaitns    PAST MEDICAL & SURGICAL HISTORY:  HTN (hypertension)  HLD (hyperlipidemia)  Diabetes mellitus, type 2  CAD (coronary artery disease)  Chronic CHF  CKD (chronic kidney disease)  S/P CABG x 1  History of appendectomy  History of cholecystectomy  H/O abdominal surgery for &#x27;stomach cancer&#x27;        Allergies  No Known Allergies       FAMILY HISTORY:  FH: type 2 diabetes (Mother, Sibling)  FHx: kidney failure (Sibling)      Social history: Social History:  Former smoker of cigarettes (stopped in Jan 2005), drinks alcohol rarely, and denies illicit drug use (10 Mar 2023 13:59)       Smoking: smoked for ~40 years more that 2 pks/day average        Review of Systems: as stated above    CONSTITUTIONAL: No fever, No chills, No fatigue  ENMT:  No difficulty hearing, No tinnitus, No vertigo; No sinus or throat pain  NECK: No pain, No stiffness  RESPIRATORY: No Cough, No SOB, No Secretions  CARDIOVASCULAR: +chest pain, No palpitations, No dizziness, or No leg swelling        Medications:  MEDICATIONS  (STANDING):  amLODIPine   Tablet 5 milliGRAM(s) Oral daily  aspirin  chewable 81 milliGRAM(s) Oral daily  atorvastatin 80 milliGRAM(s) Oral at bedtime  cholecalciferol 1000 Unit(s) Oral daily  cyanocobalamin 1000 MICROGram(s) Oral daily  dextrose 5%. 1000 milliLiter(s) (50 mL/Hr) IV Continuous <Continuous>  dextrose 5%. 1000 milliLiter(s) (100 mL/Hr) IV Continuous <Continuous>  dextrose 50% Injectable 25 Gram(s) IV Push once  dextrose 50% Injectable 12.5 Gram(s) IV Push once  dextrose 50% Injectable 25 Gram(s) IV Push once  enoxaparin Injectable 40 milliGRAM(s) SubCutaneous every 24 hours  furosemide    Tablet 40 milliGRAM(s) Oral daily  gabapentin 600 milliGRAM(s) Oral three times a day  glucagon  Injectable 1 milliGRAM(s) IntraMuscular once  insulin lispro (ADMELOG) corrective regimen sliding scale   SubCutaneous three times a day before meals  insulin lispro (ADMELOG) corrective regimen sliding scale   SubCutaneous at bedtime  lactobacillus acidophilus 1 Tablet(s) Oral daily  metoprolol succinate ER 50 milliGRAM(s) Oral daily  regadenoson Injectable 0.4 milliGRAM(s) IV Push once  spironolactone 25 milliGRAM(s) Oral daily  tiotropium 2.5 MICROgram(s) Inhaler 2 Puff(s) Inhalation daily    MEDICATIONS  (PRN):  acetaminophen     Tablet .. 650 milliGRAM(s) Oral every 6 hours PRN Temp greater or equal to 38C (100.4F), Mild Pain (1 - 3)  albuterol    90 MICROgram(s) HFA Inhaler 2 Puff(s) Inhalation every 6 hours PRN Shortness of Breath and/or Wheezing  dextrose Oral Gel 15 Gram(s) Oral once PRN Blood Glucose LESS THAN 70 milliGRAM(s)/deciliter  melatonin 3 milliGRAM(s) Oral at bedtime PRN Insomnia      Antibiotics History      Heme Medications   aspirin  chewable 81 milliGRAM(s) Oral daily, 03-11-23 @ 00:00  enoxaparin Injectable 40 milliGRAM(s) SubCutaneous every 24 hours, 03-10-23 @ 16:17       Home Medications:  Last Order Reconciliation Date: 03-12-23 @ 08:51 (Admission Reconciliation)  albuterol 90 mcg/inh inhalation aerosol: 2 puff(s) inhaled every 6 hours, As Needed (03-12-23 @ 08:51)  Alogliptin 12.5 mg oral tablet: 1 tab(s) orally once a day (03-12-23 @ 08:51)  amLODIPine 5 mg oral tablet: 1 tab(s) orally once a day (03-12-23 @ 08:51)  aspirin 81 mg oral tablet: 1 tab(s) orally once a day (03-10-23 @ 14:08)  atorvastatin 80 mg oral tablet: 1 tab(s) orally once a day (03-12-23 @ 08:51)  Bacid (LAC) oral capsule: 1 cap(s) orally once a day (03-10-23 @ 14:08)  cholecalciferol 400 intl units (10 mcg) oral tablet: 2 tab(s) orally once a day (03-12-23 @ 08:51)  cyanocobalamin 1000 mcg oral tablet: 1 tab(s) orally once a day (03-10-23 @ 14:08)  empagliflozin 25 mg oral tablet: 1 tab(s) orally once a day (in the morning) (03-12-23 @ 08:51)  ezetimibe 10 mg oral tablet: 1 tab(s) orally once a day (03-12-23 @ 08:51)  furosemide 40 mg oral tablet: 1 tab(s) orally once a day (03-12-23 @ 08:51)  gabapentin 300 mg oral capsule: 2 cap(s) orally 3 times a day (03-10-23 @ 14:08)  lisinopril 40 mg oral tablet: 1 tab(s) orally once a day (03-10-23 @ 14:08)  metFORMIN 500 mg oral tablet: 2 tab(s) orally 2 times a day (03-12-23 @ 08:51)  metoprolol succinate 100 mg oral tablet, extended release: 0.5 tab(s) orally once a day (03-12-23 @ 08:51)  NovoLIN 70/30 FlexPen subcutaneous suspension: 25 unit(s) subcutaneous once a day (at bedtime) (03-12-23 @ 08:51)  NovoLIN 70/30 subcutaneous suspension: 60 unit(s) subcutaneous once a day (in the morning) (03-12-23 @ 08:51)  Spiriva 18 mcg inhalation capsule: 1 cap(s) inhaled once a day (03-10-23 @ 14:08)  spironolactone 25 mg oral tablet: 1 tab(s) orally once a day (03-10-23 @ 14:08)      LABS:                        14.6   6.74  )-----------( 177      ( 12 Mar 2023 06:45 )             44.8     03-12    141  |  106  |  29<H>  ----------------------------<  211<H>  5.2   |  27  |  1.91<H>    Ca    10.4      12 Mar 2023 06:45  Phos  3.2     03-11  Mg     2.4     03-11    TPro  7.3  /  Alb  3.2<L>  /  TBili  0.2  /  DBili  x   /  AST  16  /  ALT  21  /  AlkPhos  96  03-12       Trend Cardiac Enzymes  03-11-23 @ 07:15  ALF-[id number]-SFBID-CPKMM/Trop I - -- - --  - --  -  --  /  17.5  03-10-23 @ 12:34  ZGE-UNKHI-LCPEK-CPKMM/Trop I - -- - --  - --  -  --  /  16.0  03-10-23 @ 11:33  OIP-RCQIZ-CBJFC-CPKMM/Trop I - -- - --  - --  -  --  /  15.6      RADIOLOGY  CXR:  < from: Xray Chest 1 View- PORTABLE-Urgent (03.10.23 @ 11:59) >    ACC: [id number] EXAM:  XR CHEST PORTABLE URGENT 1V   ORDERED BY: SRIDHAR PINEDA     PROCEDURE DATE:  03/10/2023          INTERPRETATION:  AP erect chest on March 10, 2023 at 11:52 AM. Patient   has chest pain.    Heart normal for projection. Sternotomy again noted.    Lungs remain clear.    Chest is similar to January 24, 2022.    IMPRESSION: No acute finding or change.    --- End of Report ---          < end of copied text >      CT:  < from: CT Chest No Cont (03.10.23 @ 12:38) >    ACC: [id number] EXAM:  CT CHEST   ORDERED BY: SRIDHARWALTER PINEDA     PROCEDURE DATE:  03/10/2023          INTERPRETATION:  INDICATION: left chest pain, abnormal chest radiograph TECHNIQUE: Unenhanced CT of the chest. Coronal, sagittal, and MIP images were reconstructed and reviewed.  COMPARISON: No prior chest CT. Portable chest radiograph 3/10/2023    FINDINGS:    AIRWAYS, LUNGS and PLEURA: Patent central airways. Centrilobular emphysema. 6 mm oval nodule within right lower lobe. Small smooth wall air cysts within left lower lobe. 3 mm subpleural triangular nodule   within left upper lobe. The lungs are otherwise clear. No pleural effusion.    LYMPH NODES, MEDIASTINUM AND MANAS: No lymphadenopathy.    HEART AND VESSELS: Heart size is normal. No pericardial effusion. Thoracic aorta and pulmonary artery normal in diameter. CABG.    VISUALIZED UPPER ABDOMEN: Nodular thickening/hypertrophy of the left adrenal gland. Aortic calcification. Cholecystectomy.    CHEST WALL AND BONES: The bones are dense. Healed sternotomy. Gynecomastia.    LOWER NECK: Within normal limits.    IMPRESSION:    Emphysema. 6 mm right lower lobe nodule. Recommend follow-up chest CT in 6 months to determine stability.    --- End of Report ---      < end of copied text >    ECHO:  < from: TTE Echo Complete w/o Contrast w/ Doppler (03.11.23 @ 08:18) >  Summary:   1. Left ventricular ejection fraction, by visual estimation, is 50 to 55%.   2. Technically fair study.   3. Normal global left ventricular systolic function.   4. Spectral Doppler shows restrictive pattern of left ventricular  myocardial filling (Grade III diastolic dysfunction).   5. Moderately enlarged left atrium.   6. Normal right atrial size.   7. Mild to moderate mitral valve regurgitation.   8. Moderate thickening and calcification of the anterior and posterior mitral valve leaflets.   9. Mild tricuspid regurgitation.  10. Mild aortic valve stenosis.  11. Calcification of the aortic annulus.  12. Pulmonary hypertension is absent.  13. When compared with an echo 1/25/22 there is probably no significant interval change.  14. LA volume Index is 30.5 ml/m² ml/m2.    [id number] Bayron Hung , Electronically signed on 3/11/2023 at   10:06:50 AM    < end of copied text >      VITALS:  T(C): 36.4 (03-12-23 @ 05:27), Max: 36.5 (03-11-23 @ 21:06)  T(F): 97.5 (03-12-23 @ 05:27), Max: 97.7 (03-11-23 @ 21:06)  HR: 50 (03-12-23 @ 08:41) (50 - 55)  BP: 166/77 (03-12-23 @ 05:27) (123/52 - 166/77)  BP(mean): --  ABP: --  ABP(mean): --  RR: 15 (03-12-23 @ 05:27) (15 - 16)  SpO2: 98% (03-12-23 @ 08:41) (96% - 98%)  CVP(mm Hg): --  CVP(cm H2O): --    Ins and Outs       Height (cm): 180.3 (03-10-23 @ 16:29)  Weight (kg): 98.4 (03-10-23 @ 11:21)  BMI (kg/m2): 30.3 (03-10-23 @ 16:29)        I&O's Detail      Physical Examination:  GENERAL:               Alert, Oriented, No acute distress.    HEENT:                  No JVD, Moist MM  PULM:                     Bilateral air entry, Clear to auscultation bilaterally, no significant sputum production, No Rales, No Rhonchi, No Wheezing  CVS:                         S1, S2,  No Murmur  ABD:                        Soft, nondistended, nontender, normoactive bowel sounds,   EXT:                         No edema, nontender, No Cyanosis or Clubbing    NEURO:                  Alert, oriented, interactive, nonfocal, follows commands  PSYC:                      Calm, + Insight.

## 2023-03-12 NOTE — PROGRESS NOTE ADULT - SUBJECTIVE AND OBJECTIVE BOX
Follow up for  SUBJ:    comfortable no cardiac symptom reported    PMH  HTN (hypertension)    HLD (hyperlipidemia)    Diabetes mellitus, type 2    CAD (coronary artery disease)    Chronic CHF    CKD (chronic kidney disease)        MEDICATIONS  (STANDING):  amLODIPine   Tablet 5 milliGRAM(s) Oral daily  aspirin  chewable 81 milliGRAM(s) Oral daily  atorvastatin 80 milliGRAM(s) Oral at bedtime  cholecalciferol 1000 Unit(s) Oral daily  cyanocobalamin 1000 MICROGram(s) Oral daily  dextrose 5%. 1000 milliLiter(s) (50 mL/Hr) IV Continuous <Continuous>  dextrose 5%. 1000 milliLiter(s) (100 mL/Hr) IV Continuous <Continuous>  dextrose 50% Injectable 25 Gram(s) IV Push once  dextrose 50% Injectable 12.5 Gram(s) IV Push once  dextrose 50% Injectable 25 Gram(s) IV Push once  enoxaparin Injectable 40 milliGRAM(s) SubCutaneous every 24 hours  furosemide    Tablet 40 milliGRAM(s) Oral daily  gabapentin 600 milliGRAM(s) Oral three times a day  glucagon  Injectable 1 milliGRAM(s) IntraMuscular once  insulin glargine Injectable (LANTUS) 15 Unit(s) SubCutaneous every morning  insulin glargine Injectable (LANTUS) 15 Unit(s) SubCutaneous at bedtime  insulin lispro (ADMELOG) corrective regimen sliding scale   SubCutaneous three times a day before meals  insulin lispro (ADMELOG) corrective regimen sliding scale   SubCutaneous at bedtime  lactobacillus acidophilus 1 Tablet(s) Oral daily  metoprolol succinate ER 50 milliGRAM(s) Oral daily  spironolactone 25 milliGRAM(s) Oral daily  tiotropium 2.5 MICROgram(s) Inhaler 2 Puff(s) Inhalation daily    MEDICATIONS  (PRN):  acetaminophen     Tablet .. 650 milliGRAM(s) Oral every 6 hours PRN Temp greater or equal to 38C (100.4F), Mild Pain (1 - 3)  albuterol    90 MICROgram(s) HFA Inhaler 2 Puff(s) Inhalation every 6 hours PRN Shortness of Breath and/or Wheezing  dextrose Oral Gel 15 Gram(s) Oral once PRN Blood Glucose LESS THAN 70 milliGRAM(s)/deciliter  melatonin 3 milliGRAM(s) Oral at bedtime PRN Insomnia        PHYSICAL EXAM:  Vital Signs Last 24 Hrs  T(C): 36.4 (12 Mar 2023 05:27), Max: 36.5 (11 Mar 2023 21:06)  T(F): 97.5 (12 Mar 2023 05:27), Max: 97.7 (11 Mar 2023 21:06)  HR: 50 (12 Mar 2023 08:41) (50 - 55)  BP: 166/77 (12 Mar 2023 05:27) (123/52 - 166/77)  BP(mean): --  RR: 15 (12 Mar 2023 05:27) (15 - 16)  SpO2: 98% (12 Mar 2023 08:41) (96% - 98%)    Parameters below as of 12 Mar 2023 08:41  Patient On (Oxygen Delivery Method): room air        GENERAL: NAD, well-groomed, well-developed  HEAD:  Atraumatic, Normocephalic  EYES: EOMI, PERRLA, conjunctiva and sclera clear  ENT: Moist mucous membranes,  NECK: Supple, No JVD, no bruits  CHEST/LUNG: Clear to percussion bilaterally; No rales, rhonchi, wheezing, or rubs  HEART: Regular rate and rhythm; No murmurs, rubs, or gallops PMI non displaced.  ABDOMEN: Soft, Nontender, Nondistended; Bowel sounds present  EXTREMITIES:  2+ Peripheral Pulses, No clubbing, cyanosis, or edema  SKIN: No rashes or lesions  NERVOUS SYSTEM:  Cranial Nerves II-XII intact      TELEMETRY:    ECG:    < from: 12 Lead ECG (03.10.23 @ 11:19) >  Diagnosis Line Sinus bradycardia  Nonspecific ST abnormality  Abnormal ECG  When compared with ECG of 24-JAN-2022 14:42,  No significant change was found  Confirmed by THAIS BE, BETH HARDIN ([id number]) on 3/10/2023 2:05:09 PM    < end of copied text >    ECHO:    LABS:                        14.6   6.74  )-----------( 177      ( 12 Mar 2023 06:45 )             44.8     03-12    141  |  106  |  29<H>  ----------------------------<  211<H>  5.2   |  27  |  1.91<H>    Ca    10.4      12 Mar 2023 06:45  Phos  3.2     03-11  Mg     2.4     03-11    TPro  7.3  /  Alb  3.2<L>  /  TBili  0.2  /  DBili  x   /  AST  16  /  ALT  21  /  AlkPhos  96  03-12        I&O's Summary    BNP    RADIOLOGY & ADDITIONAL STUDIES:    < from: Xray Chest 1 View- PORTABLE-Urgent (03.10.23 @ 11:59) >    IMPRESSION: No acute finding or change.    --- End of Report ---      ALYSSA GONZALES MD; Attending Radiologist  This document has been electronically signed. Mar 10 2023  1:01PM    < end of copied text >      ECHO:    impression  pror bypass sugery patient now with chest pains.     plan adenosine cardiology stress in AM.     will request pulm eval on spiriva.     discussed with dr gleason

## 2023-03-12 NOTE — CONSULT NOTE ADULT - ASSESSMENT
78 year old man presents with complaints of chest pain that is now resolved.  He has known CAD, s/p remote CABG.  HTN, HLD, CHF  EKG is abnormal, though largely unchanged from prior   He reports [facility] that he thinks was stable     Plan  - telemetry   - trend cardiac troponin and serial EKGs  - continue ASA and statin   - continue home metoprolol   - hold enalapril in setting of DOUGLAS   - check echo   - review old records if available     discussed with patient   discussed with Hospitalist       
Assessment    79 yo man with history of Ex smoker with COPD, h/o lung nodule. CAD s/p CABG, Diabetes Mellitus II, Hypertension, Hyperlipidemia, and history CHF comes to ED with 30 minutes of sharp chest pain around 11 am. Patient was sitting down when chest pain started on lower left side of chest. It was non-radiating and sharp. The pain resolved while in the ED. He denies any alleviating or exacerbating factors. He denies fever, chills, shortness of breath, nausea, vomiting, palpitations, dizziness, or change in vision.    Problem List   1. Chest pain r/o cardiac causes   2. Lung nodule  3. COPD - ex smoker      Plan  No pulmonary contraindications for planned stress test  Lung nodule - 6mm chronic , out patient f/u with [facility]  continue Spiriva and albuterol prn  rest of care as per primary team  will follow as needed

## 2023-03-13 ENCOUNTER — TRANSCRIPTION ENCOUNTER (OUTPATIENT)
Age: 78
End: 2023-03-13

## 2023-03-13 VITALS
RESPIRATION RATE: 17 BRPM | HEART RATE: 61 BPM | SYSTOLIC BLOOD PRESSURE: 145 MMHG | OXYGEN SATURATION: 97 % | TEMPERATURE: 98 F | DIASTOLIC BLOOD PRESSURE: 81 MMHG

## 2023-03-13 LAB
ALBUMIN SERPL ELPH-MCNC: 3.5 G/DL — SIGNIFICANT CHANGE UP (ref 3.3–5)
ALP SERPL-CCNC: 82 U/L — SIGNIFICANT CHANGE UP (ref 40–120)
ALT FLD-CCNC: 24 U/L — SIGNIFICANT CHANGE UP (ref 10–45)
ANION GAP SERPL CALC-SCNC: 10 MMOL/L — SIGNIFICANT CHANGE UP (ref 5–17)
AST SERPL-CCNC: 16 U/L — SIGNIFICANT CHANGE UP (ref 10–40)
BASOPHILS # BLD AUTO: 0.06 K/UL — SIGNIFICANT CHANGE UP (ref 0–0.2)
BASOPHILS NFR BLD AUTO: 1 % — SIGNIFICANT CHANGE UP (ref 0–2)
BILIRUB SERPL-MCNC: 0.4 MG/DL — SIGNIFICANT CHANGE UP (ref 0.2–1.2)
BUN SERPL-MCNC: 28 MG/DL — HIGH (ref 7–23)
CALCIUM SERPL-MCNC: 10.6 MG/DL — HIGH (ref 8.4–10.5)
CHLORIDE SERPL-SCNC: 107 MMOL/L — SIGNIFICANT CHANGE UP (ref 96–108)
CO2 SERPL-SCNC: 24 MMOL/L — SIGNIFICANT CHANGE UP (ref 22–31)
CREAT SERPL-MCNC: 1.47 MG/DL — HIGH (ref 0.5–1.3)
EGFR: 49 ML/MIN/1.73M2 — LOW
EOSINOPHIL # BLD AUTO: 0.22 K/UL — SIGNIFICANT CHANGE UP (ref 0–0.5)
EOSINOPHIL NFR BLD AUTO: 3.6 % — SIGNIFICANT CHANGE UP (ref 0–6)
GLUCOSE BLDC GLUCOMTR-MCNC: 113 MG/DL — HIGH (ref 70–99)
GLUCOSE BLDC GLUCOMTR-MCNC: 141 MG/DL — HIGH (ref 70–99)
GLUCOSE BLDC GLUCOMTR-MCNC: 144 MG/DL — HIGH (ref 70–99)
GLUCOSE SERPL-MCNC: 162 MG/DL — HIGH (ref 70–99)
HCT VFR BLD CALC: 47 % — SIGNIFICANT CHANGE UP (ref 39–50)
HGB BLD-MCNC: 15.2 G/DL — SIGNIFICANT CHANGE UP (ref 13–17)
IMM GRANULOCYTES NFR BLD AUTO: 0.2 % — SIGNIFICANT CHANGE UP (ref 0–0.9)
LYMPHOCYTES # BLD AUTO: 1.48 K/UL — SIGNIFICANT CHANGE UP (ref 1–3.3)
LYMPHOCYTES # BLD AUTO: 24.2 % — SIGNIFICANT CHANGE UP (ref 13–44)
MCHC RBC-ENTMCNC: 30.3 PG — SIGNIFICANT CHANGE UP (ref 27–34)
MCHC RBC-ENTMCNC: 32.3 GM/DL — SIGNIFICANT CHANGE UP (ref 32–36)
MCV RBC AUTO: 93.8 FL — SIGNIFICANT CHANGE UP (ref 80–100)
MONOCYTES # BLD AUTO: 0.77 K/UL — SIGNIFICANT CHANGE UP (ref 0–0.9)
MONOCYTES NFR BLD AUTO: 12.6 % — SIGNIFICANT CHANGE UP (ref 2–14)
NEUTROPHILS # BLD AUTO: 3.57 K/UL — SIGNIFICANT CHANGE UP (ref 1.8–7.4)
NEUTROPHILS NFR BLD AUTO: 58.4 % — SIGNIFICANT CHANGE UP (ref 43–77)
NRBC # BLD: 0 /100 WBCS — SIGNIFICANT CHANGE UP (ref 0–0)
PLATELET # BLD AUTO: 177 K/UL — SIGNIFICANT CHANGE UP (ref 150–400)
POTASSIUM SERPL-MCNC: 4.4 MMOL/L — SIGNIFICANT CHANGE UP (ref 3.5–5.3)
POTASSIUM SERPL-SCNC: 4.4 MMOL/L — SIGNIFICANT CHANGE UP (ref 3.5–5.3)
PROT SERPL-MCNC: 7.6 G/DL — SIGNIFICANT CHANGE UP (ref 6–8.3)
RBC # BLD: 5.01 M/UL — SIGNIFICANT CHANGE UP (ref 4.2–5.8)
RBC # FLD: 13.2 % — SIGNIFICANT CHANGE UP (ref 10.3–14.5)
SODIUM SERPL-SCNC: 141 MMOL/L — SIGNIFICANT CHANGE UP (ref 135–145)
WBC # BLD: 6.11 K/UL — SIGNIFICANT CHANGE UP (ref 3.8–10.5)
WBC # FLD AUTO: 6.11 K/UL — SIGNIFICANT CHANGE UP (ref 3.8–10.5)

## 2023-03-13 PROCEDURE — 85730 THROMBOPLASTIN TIME PARTIAL: CPT

## 2023-03-13 PROCEDURE — 99239 HOSP IP/OBS DSCHRG MGMT >30: CPT

## 2023-03-13 PROCEDURE — 82962 GLUCOSE BLOOD TEST: CPT

## 2023-03-13 PROCEDURE — 82947 ASSAY GLUCOSE BLOOD QUANT: CPT

## 2023-03-13 PROCEDURE — 99285 EMERGENCY DEPT VISIT HI MDM: CPT | Mod: 25

## 2023-03-13 PROCEDURE — 80053 COMPREHEN METABOLIC PANEL: CPT

## 2023-03-13 PROCEDURE — 71045 X-RAY EXAM CHEST 1 VIEW: CPT

## 2023-03-13 PROCEDURE — 93005 ELECTROCARDIOGRAM TRACING: CPT

## 2023-03-13 PROCEDURE — 84484 ASSAY OF TROPONIN QUANT: CPT

## 2023-03-13 PROCEDURE — 71250 CT THORAX DX C-: CPT | Mod: MA

## 2023-03-13 PROCEDURE — 83735 ASSAY OF MAGNESIUM: CPT

## 2023-03-13 PROCEDURE — 94640 AIRWAY INHALATION TREATMENT: CPT

## 2023-03-13 PROCEDURE — 93016 CV STRESS TEST SUPVJ ONLY: CPT

## 2023-03-13 PROCEDURE — 87635 SARS-COV-2 COVID-19 AMP PRB: CPT

## 2023-03-13 PROCEDURE — 36415 COLL VENOUS BLD VENIPUNCTURE: CPT

## 2023-03-13 PROCEDURE — 93306 TTE W/DOPPLER COMPLETE: CPT

## 2023-03-13 PROCEDURE — 93018 CV STRESS TEST I&R ONLY: CPT

## 2023-03-13 PROCEDURE — 78452 HT MUSCLE IMAGE SPECT MULT: CPT | Mod: 26

## 2023-03-13 PROCEDURE — 85610 PROTHROMBIN TIME: CPT

## 2023-03-13 PROCEDURE — 99232 SBSQ HOSP IP/OBS MODERATE 35: CPT | Mod: 25

## 2023-03-13 PROCEDURE — 85027 COMPLETE CBC AUTOMATED: CPT

## 2023-03-13 PROCEDURE — 78452 HT MUSCLE IMAGE SPECT MULT: CPT

## 2023-03-13 PROCEDURE — A9500: CPT

## 2023-03-13 PROCEDURE — 93017 CV STRESS TEST TRACING ONLY: CPT

## 2023-03-13 PROCEDURE — 80061 LIPID PANEL: CPT

## 2023-03-13 PROCEDURE — 84100 ASSAY OF PHOSPHORUS: CPT

## 2023-03-13 PROCEDURE — 83036 HEMOGLOBIN GLYCOSYLATED A1C: CPT

## 2023-03-13 PROCEDURE — 83880 ASSAY OF NATRIURETIC PEPTIDE: CPT

## 2023-03-13 PROCEDURE — 85025 COMPLETE CBC W/AUTO DIFF WBC: CPT

## 2023-03-13 RX ADMIN — Medication 20 MILLIGRAM(S): at 05:29

## 2023-03-13 RX ADMIN — REGADENOSON 0.4 MILLIGRAM(S): 0.08 INJECTION, SOLUTION INTRAVENOUS at 10:10

## 2023-03-13 RX ADMIN — GABAPENTIN 600 MILLIGRAM(S): 400 CAPSULE ORAL at 05:29

## 2023-03-13 RX ADMIN — TIOTROPIUM BROMIDE 2 PUFF(S): 18 CAPSULE ORAL; RESPIRATORY (INHALATION) at 08:10

## 2023-03-13 RX ADMIN — Medication 1 TABLET(S): at 13:36

## 2023-03-13 RX ADMIN — PREGABALIN 1000 MICROGRAM(S): 225 CAPSULE ORAL at 13:37

## 2023-03-13 RX ADMIN — SPIRONOLACTONE 25 MILLIGRAM(S): 25 TABLET, FILM COATED ORAL at 05:29

## 2023-03-13 RX ADMIN — Medication 1000 UNIT(S): at 13:37

## 2023-03-13 RX ADMIN — AMLODIPINE BESYLATE 5 MILLIGRAM(S): 2.5 TABLET ORAL at 05:29

## 2023-03-13 RX ADMIN — GABAPENTIN 600 MILLIGRAM(S): 400 CAPSULE ORAL at 13:39

## 2023-03-13 RX ADMIN — Medication 81 MILLIGRAM(S): at 13:37

## 2023-03-13 NOTE — DISCHARGE NOTE PROVIDER - PROVIDER TOKENS
FREE:[LAST:[primary care provider at [facility]],PHONE:[(   )    -],FAX:[(   )    -],FOLLOWUP:[1 week],ESTABLISHEDPATIENT:[T]]

## 2023-03-13 NOTE — DISCHARGE NOTE NURSING/CASE MANAGEMENT/SOCIAL WORK - PATIENT PORTAL LINK FT
You can access the FollowMyHealth Patient Portal offered by [facility] by registering at the following website: [email or web address]. By joining [facility]’s FollowMyHealth portal, you will also be able to view your health information using other applications (apps) compatible with our system.

## 2023-03-13 NOTE — PROGRESS NOTE ADULT - SUBJECTIVE AND OBJECTIVE BOX
JAYA US  [id number]      Chief Complaint: Chest pain/CAD    Interval History: The patient denies recurrent chest pain.     Tele: sinus 50s BPM      Current meds:   acetaminophen     Tablet .. 650 milliGRAM(s) Oral every 6 hours PRN  albuterol    90 MICROgram(s) HFA Inhaler 2 Puff(s) Inhalation every 6 hours PRN  amLODIPine   Tablet 5 milliGRAM(s) Oral daily  aspirin  chewable 81 milliGRAM(s) Oral daily  atorvastatin 80 milliGRAM(s) Oral at bedtime  cholecalciferol 1000 Unit(s) Oral daily  cyanocobalamin 1000 MICROGram(s) Oral daily  dextrose 5%. 1000 milliLiter(s) IV Continuous <Continuous>  dextrose 5%. 1000 milliLiter(s) IV Continuous <Continuous>  dextrose 50% Injectable 25 Gram(s) IV Push once  dextrose 50% Injectable 25 Gram(s) IV Push once  dextrose 50% Injectable 12.5 Gram(s) IV Push once  dextrose Oral Gel 15 Gram(s) Oral once PRN  enoxaparin Injectable 40 milliGRAM(s) SubCutaneous every 24 hours  furosemide    Tablet 20 milliGRAM(s) Oral daily  gabapentin 600 milliGRAM(s) Oral three times a day  glucagon  Injectable 1 milliGRAM(s) IntraMuscular once  insulin lispro (ADMELOG) corrective regimen sliding scale   SubCutaneous every 6 hours  insulin lispro (ADMELOG) corrective regimen sliding scale   SubCutaneous three times a day before meals  insulin lispro (ADMELOG) corrective regimen sliding scale   SubCutaneous at bedtime  lactobacillus acidophilus 1 Tablet(s) Oral daily  melatonin 3 milliGRAM(s) Oral at bedtime PRN  metoprolol succinate ER 50 milliGRAM(s) Oral daily  polyethylene glycol 3350 17 Gram(s) Oral daily PRN  regadenoson Injectable 0.4 milliGRAM(s) IV Push once  spironolactone 25 milliGRAM(s) Oral daily  tiotropium 2.5 MICROgram(s) Inhaler 2 Puff(s) Inhalation daily      Objective:     Vital Signs:   T(C): 36.4 (03-13-23 @ 05:12), Max: 36.6 (03-12-23 @ 14:41)  HR: 45 (03-13-23 @ 08:14) (45 - 66)  BP: 149/69 (03-13-23 @ 05:12) (149/69 - 162/77)  RR: 16 (03-13-23 @ 05:12) (16 - 18)  SpO2: 97% (03-13-23 @ 08:14) (96% - 97%)  Wt(kg): --    Physical Exam:   General: no acute distress  Neck: supple   CVS: JVP ~ 7 cm H20, RRR, s1, s2  Pulm: unlabored respirations, CTAB  Ext: no lower extremity edema b/l   Neuro: awake, alert and oriented   Psych: Normal affect      Labs:   13 Mar 2023 06:30    141    |  107    |  28     ----------------------------<  162    4.4     |  24     |  1.47     Ca    10.6       13 Mar 2023 06:30    TPro  7.6    /  Alb  3.5    /  TBili  0.4    /  DBili  x      /  AST  16     /  ALT  24     /  AlkPhos  82     13 Mar 2023 06:30                          15.2   6.11  )-----------( 177      ( 13 Mar 2023 06:30 )             47.0         Outpatient CT angiogram (10/2021) at [facility]:  LM: no stenosis  LAD: < 25% stenosis   Diagonal: patent  LCx: 25-49% stenosis   OM: patent   RCA: 50-75% stenosis   LIMA-mid LAD: patent      Outpatient Nuclear Stress Test (12/16/21) at [facility]:  Small area of apical anterolateral ischemia and a small area of partially reversible defect involving the basal inferolateral wall suggestive of mild marshall-infarction ischemia, LVEF 55%. Previous nuclear on 6/2/2017 reported small area of apical anterolateral ischemia    TTE (3/11/23):  LVEF 50-55%  LA enlargement  Mild-moderate MR, Mild TR, Mild aortic valve stenosis    ECG (3/10/23): sinus bradycardia, first degree AV block, nonspecific ST abnormalities

## 2023-03-13 NOTE — DISCHARGE NOTE PROVIDER - NSDCCPCAREPLAN_GEN_ALL_CORE_FT
PRINCIPAL DISCHARGE DIAGNOSIS  Diagnosis: Chest pain  Assessment and Plan of Treatment: you were monitored on telemetry, your symptoms self resolved, you underwetn nuclear stress testing as per recommendations of caridology. Results showed:       PRINCIPAL DISCHARGE DIAGNOSIS  Diagnosis: Chest pain  Assessment and Plan of Treatment: you were monitored on telemetry, your symptoms self resolved, you underwetn nuclear stress testing as per recommendations of caridology. Results showed normal stress test, please follow up with your outpatient cardiologist and primary medical provider within 1 week of discharge for post hospital discharge follow up for further care and management

## 2023-03-13 NOTE — DISCHARGE NOTE PROVIDER - NSDCMRMEDTOKEN_GEN_ALL_CORE_FT
albuterol 90 mcg/inh inhalation aerosol: 2 puff(s) inhaled every 6 hours, As Needed  Alogliptin 12.5 mg oral tablet: 1 tab(s) orally once a day  amLODIPine 5 mg oral tablet: 1 tab(s) orally once a day  aspirin 81 mg oral tablet: 1 tab(s) orally once a day  atorvastatin 80 mg oral tablet: 1 tab(s) orally once a day  Bacid (LAC) oral capsule: 1 cap(s) orally once a day  cholecalciferol 400 intl units (10 mcg) oral tablet: 2 tab(s) orally once a day  cyanocobalamin 1000 mcg oral tablet: 1 tab(s) orally once a day  empagliflozin 25 mg oral tablet: 1 tab(s) orally once a day (in the morning)  ezetimibe 10 mg oral tablet: 1 tab(s) orally once a day  furosemide 40 mg oral tablet: 1 tab(s) orally once a day  gabapentin 300 mg oral capsule: 2 cap(s) orally 3 times a day  lisinopril 40 mg oral tablet: 1 tab(s) orally once a day  metFORMIN 500 mg oral tablet: 2 tab(s) orally 2 times a day  metoprolol succinate 100 mg oral tablet, extended release: 0.5 tab(s) orally once a day  NovoLIN 70/30 FlexPen subcutaneous suspension: 25 unit(s) subcutaneous once a day (at bedtime)  NovoLIN 70/30 subcutaneous suspension: 60 unit(s) subcutaneous once a day (in the morning)  Spiriva 18 mcg inhalation capsule: 1 cap(s) inhaled once a day  spironolactone 25 mg oral tablet: 1 tab(s) orally once a day

## 2023-03-13 NOTE — PROGRESS NOTE ADULT - ASSESSMENT
77 yo man with history of CAD s/p CABG, Diabetes Mellitus II, Hypertension, Hyperlipidemia, and history CHF comes to ED with 30 minutes of sharp chest pain, now resolved.  Patient states cardiology recommending stress test.    Acute Chest pain R/O ACS  History CAD s.p CABG  History HFrEF  - Continue Telemonitoring while in observation  - Continue ASA and atorvastatin 80mg  - Continue metoprolol, lasix,   - Hold ACEi due to DOUGLAS  - Cardiac Enzyme neg x 2  -Cardiology recs apprec Dr Darnell recs: stress test today, dispo once resulted    Emphysema - Chronic  - Seen on CT Chest (3/10) ; incidental lung nodule in RLL also noted, pt will be reinforced to follow up in 6 months with CT as outpt, pt is aware and has been getting follow up  - Continue albuterol PRN and Spiriva    Diabetes Mellitus II - Controlled  - Home meds: Alogliptin, empagliflozin, metformin BID, Novolin 70/30 60 units in AM, 25 units in PM  - F/U A1c  - will hold lantus for now as npo at [facility] [place]; ldiss at mno and q6hrs fsbs checks at mno, give ivf if clincally needed and adjust iss as clinically warrante  - Hypoglycemia protocol and insulin sliding scale at pre-meal and at night  - Blood glucose goal 100-180  - Monitor BUN/Cr and electrolytes    Hypertension  - Continue amlodipine, metoprolol, and lasix  - Monitor vital signs     Hyperlipidemia   - Continue statin and ezetimibe    DOUGLAS on CKD IIIa  - Baseline SCr near 1.4, today 1.9, will decrease lasix 20mg and monitor   - Encourage oral intake  - Avoid nephrotoxic agents so holding lisinopril 40mg    DVT Prophylaxis with Lovenox     	  79 yo man with history of CAD s/p CABG, Diabetes Mellitus II, Hypertension, Hyperlipidemia, and history CHF comes to ED with 30 minutes of sharp chest pain, now resolved.  Patient states cardiology recommending stress test.    Acute Chest pain R/O ACS  History CAD s.p CABG  History HFrEF  - Continue Telemonitoring while in observation  - Continue ASA and atorvastatin 80mg  - Continue metoprolol, lasix,   - Hold ACEi due to DOUGLAS  - Cardiac Enzyme neg x 2  -Cardiology recs apprec Dr Darnell recs: stress test today, dispo once resulted    Emphysema - Chronic  - Seen on CT Chest (3/10) ; incidental lung nodule in RLL also noted, pt will be reinforced to follow up in 6 months with CT as outpt, pt is aware and has been getting follow up  - Continue albuterol PRN and Spiriva    Diabetes Mellitus II - Controlled  - Home meds: Alogliptin, empagliflozin, metformin BID, Novolin 70/30 60 units in AM, 25 units in PM  - F/U A1c  - [place] [facility] [place] at [place] and q6hrs fsbs checks at mno, give ivf if clincally needed and adjust iss as clinically warrante  - Hypoglycemia protocol and insulin sliding scale at pre-meal and at night  - Blood glucose goal 100-180  - Monitor BUN/Cr and electrolytes    Hypertension  - Continue amlodipine, metoprolol, and lasix  - Monitor vital signs     Hyperlipidemia   - Continue statin and ezetimibe    DOUGLAS on CKD IIIa  - Baseline SCr near 1.4, today improved and close to baseline, cont lasix 20mg and monitor   - Encourage oral intake  - Avoid nephrotoxic agents so holding lisinopril 40mg    DVT Prophylaxis with Lovenox

## 2023-03-13 NOTE — PROGRESS NOTE ADULT - TIME BILLING
care coordination, plan of care discussed with janna face to face, tele IDR, Dr Darnell Cardio
care coordination, plan of care discussed with janna face to face, tele IDR, Dr Hung Cardio
care coordination, plan of care discussed with janna face to face, tele IDR

## 2023-03-13 NOTE — DISCHARGE NOTE PROVIDER - CARE PROVIDER_API CALL
[facility] provider at [facility],   Phone: (   )    -  Fax: (   )    -  Established Patient  Follow Up Time: 1 week

## 2023-03-13 NOTE — PROGRESS NOTE ADULT - ASSESSMENT
Assessment:  Franky Benson is a 78 year old man, follows with [facility] with past medical history of Coronary artery disease (s/p CABG), Peripheral arterial disease, HFpEF, Hypertension, Hyperlipidemia, Type II Diabetes mellitus and Chronic kidney disease presents with chest pain.    ECG consistent with sinus bradycardia, first degree AV block and nonspecific ST abnormalities. Troponins are negative x 3, appears less likely to be an acute coronary syndrome. Echo report with normal LVEF 50-55%, mild aortic valve stenosis, mild-moderate mitral regurgitation.    Of note, patient follows with cardiologist, Dr. Josemanuel Wang and in the past hospitalization here in January 2022 for chest pain, medical management was recommended due to unknown patient compliance after discussion with his cardiologist, and patient was also concerned about progressive renal disease with coronary angiogram.    Recommendations:  [] Chest pain, history of CAD/CABG: Discussed case with Dr. Hung and plan is for nuclear stress test today. Continue home CV meds    We will continue to follow along.    Phyllis Darnell MD  Cardiology        Assessment:  Franky Benson is a 78 year old man, follows with [facility] with past medical history of Coronary artery disease (s/p CABG), Peripheral arterial disease, HFpEF, Hypertension, Hyperlipidemia, Type II Diabetes mellitus and Chronic kidney disease presents with chest pain.    ECG consistent with sinus bradycardia, first degree AV block and nonspecific ST abnormalities. Troponins are negative x 3, appears less likely to be an acute coronary syndrome. Echo report with normal LVEF 50-55%, mild aortic valve stenosis, mild-moderate mitral regurgitation.    Of note, patient follows with cardiologist, Dr. Josemanuel Wang and in the past hospitalization here in January 2022 for chest pain, medical management was recommended due to unknown patient compliance after discussion with his cardiologist, and patient was also concerned about progressive renal disease with coronary angiogram.    Recommendations:  [] Chest pain, history of CAD/CABG: Discussed case with Dr. Hung and plan is for nuclear stress test today. Continue home CV meds    Addendum:    Nuclear stress test: report    IMPRESSION:  Normal  SPECT Myocardial Perfusion Imaging post rest and post vasodilator.  No scan evidence of reversible or fixed perfusion defects.  Normal left ventricular wall motion with ejection fraction of 64 %   (normal: 50% or greater).  No regional wall motion abnormalities.    Given normal myocardial perfusion with normal post stress LVEF 64%, the likelihood of obstructive CAD appears low at this time. Continue medical management and recommend close outpatient follow up with his cardiologist, Dr. Josemanuel Wang. We will sign off, please re-consult if needed.      Phyllis Darnell MD  Cardiology

## 2023-03-13 NOTE — PROGRESS NOTE ADULT - SUBJECTIVE AND OBJECTIVE BOX
Patient is a 78y old  Male who presents with a chief complaint of Chest pain (13 Mar 2023 09:49)      INTERVAL HPI: Pt seen and examined. States he is feeling ok, awaiting his stress test to be performed, denies any acute complaints at this time.     OVERNIGHT EVENTS: none noted  T(F): 97.4 (03-13-23 @ 14:13), Max: 97.6 (03-12-23 @ 20:48)  HR: 54 (03-13-23 @ 14:13) (45 - 66)  BP: 170/70 (03-13-23 @ 14:13) (149/69 - 170/70)  RR: 17 (03-13-23 @ 14:13) (16 - 17)  SpO2: 97% (03-13-23 @ 14:13) (96% - 97%)  I&O's Summary    13 Mar 2023 07:01  -  13 Mar 2023 15:25  --------------------------------------------------------  IN: 200 mL / OUT: 0 mL / NET: 200 mL          PHYSICAL EXAM:  GENERAL: NAD  HEAD:  Atraumatic, Normocephalic  EYES: EOMI  NECK: Supple  CHEST/LUNG: CTA  HEART: RRR; S1/S2  ABDOMEN: Soft, NT  VASCULAR: Normal pulses, Normal capillary refill  EXTREMITIES: No calf tenderness, No cyanosis, No edema  SKIN: Warm, Intact  PSYCH: Normal mood, Normal affect  NERVOUS SYSTEM:  A/O x3, No focal deficits    LABS:                        15.2   6.11  )-----------( 177      ( 13 Mar 2023 06:30 )             47.0     03-13    141  |  107  |  28<H>  ----------------------------<  162<H>  4.4   |  24  |  1.47<H>    Ca    10.6<H>      13 Mar 2023 06:30    TPro  7.6  /  Alb  3.5  /  TBili  0.4  /  DBili  x   /  AST  16  /  ALT  24  /  AlkPhos  82  03-13        CAPILLARY BLOOD GLUCOSE      POCT Blood Glucose.: 113 mg/dL (13 Mar 2023 13:33)  POCT Blood Glucose.: 141 mg/dL (13 Mar 2023 05:32)  POCT Blood Glucose.: 159 mg/dL (12 Mar 2023 21:13)  POCT Blood Glucose.: 140 mg/dL (12 Mar 2023 16:54)              MEDICATIONS  (STANDING):  amLODIPine   Tablet 5 milliGRAM(s) Oral daily  aspirin  chewable 81 milliGRAM(s) Oral daily  atorvastatin 80 milliGRAM(s) Oral at bedtime  cholecalciferol 1000 Unit(s) Oral daily  cyanocobalamin 1000 MICROGram(s) Oral daily  dextrose 5%. 1000 milliLiter(s) (50 mL/Hr) IV Continuous <Continuous>  dextrose 5%. 1000 milliLiter(s) (100 mL/Hr) IV Continuous <Continuous>  dextrose 50% Injectable 25 Gram(s) IV Push once  dextrose 50% Injectable 12.5 Gram(s) IV Push once  dextrose 50% Injectable 25 Gram(s) IV Push once  enoxaparin Injectable 40 milliGRAM(s) SubCutaneous every 24 hours  furosemide    Tablet 20 milliGRAM(s) Oral daily  gabapentin 600 milliGRAM(s) Oral three times a day  glucagon  Injectable 1 milliGRAM(s) IntraMuscular once  insulin lispro (ADMELOG) corrective regimen sliding scale   SubCutaneous three times a day before meals  insulin lispro (ADMELOG) corrective regimen sliding scale   SubCutaneous at bedtime  lactobacillus acidophilus 1 Tablet(s) Oral daily  metoprolol succinate ER 50 milliGRAM(s) Oral daily  spironolactone 25 milliGRAM(s) Oral daily  tiotropium 2.5 MICROgram(s) Inhaler 2 Puff(s) Inhalation daily    MEDICATIONS  (PRN):  acetaminophen     Tablet .. 650 milliGRAM(s) Oral every 6 hours PRN Temp greater or equal to 38C (100.4F), Mild Pain (1 - 3)  albuterol    90 MICROgram(s) HFA Inhaler 2 Puff(s) Inhalation every 6 hours PRN Shortness of Breath and/or Wheezing  dextrose Oral Gel 15 Gram(s) Oral once PRN Blood Glucose LESS THAN 70 milliGRAM(s)/deciliter  melatonin 3 milliGRAM(s) Oral at bedtime PRN Insomnia  polyethylene glycol 3350 17 Gram(s) Oral daily PRN Constipation

## 2023-03-13 NOTE — DISCHARGE NOTE PROVIDER - HOSPITAL COURSE
79 yo man with history of CAD s/p CABG, Diabetes Mellitus II, Hypertension, Hyperlipidemia, and history CHF comes to ED with 30 minutes of sharp chest pain, now resolved. Pt was placed on telemetry monitoring while ACS workup was done and cardiology recommended stress test. 77 yo man with history of CAD s/p CABG, Diabetes Mellitus II, Hypertension, Hyperlipidemia, and history CHF comes to ED with 30 minutes of sharp chest pain, now resolved. Pt was placed on telemetry monitoring while ACS workup was done and cardiology recommended stress test. Stress test IMPRESSION:  Normal  SPECT Myocardial Perfusion Imaging post rest and post vasodilator. No scan evidence of reversible or fixed perfusion defects.  Normal left ventricular wall motion with ejection fraction of 64 %  (normal: 50% or greater). No regional wall motion abnormalities. As per Cardio Dr Darnell: Given normal myocardial perfusion with normal post stress LVEF 64%, the likelihood of obstructive CAD appears low at this time. Continue medical management and recommend close outpatient follow up with his cardiologist, Dr. Josemanuel Wang. Pt is stable for discharge with close follow up with primary medical provider and outpatient cardiologist within 1 week of discharge.    Physical Exam:  GENERAL: NAD  HEAD:  Atraumatic, Normocephalic  EYES: EOMI  NECK: Supple  CHEST/LUNG: CTA  HEART: RRR; S1/S2  ABDOMEN: Soft, NT  VASCULAR: Normal pulses, Normal capillary refill  EXTREMITIES: No calf tenderness, No cyanosis, No edema  SKIN: Warm, Intact  PSYCH: Normal mood, Normal affect

## 2023-08-26 ENCOUNTER — EMERGENCY (EMERGENCY)
Facility: HOSPITAL | Age: 78
LOS: 1 days | Discharge: HOME | End: 2023-08-26
Attending: EMERGENCY MEDICINE | Admitting: EMERGENCY MEDICINE
Payer: MEDICARE

## 2023-08-26 VITALS
DIASTOLIC BLOOD PRESSURE: 61 MMHG | RESPIRATION RATE: 17 BRPM | TEMPERATURE: 98 F | OXYGEN SATURATION: 96 % | SYSTOLIC BLOOD PRESSURE: 96 MMHG | HEART RATE: 62 BPM | WEIGHT: 212.97 LBS | HEIGHT: 71 IN

## 2023-08-26 VITALS
OXYGEN SATURATION: 99 % | DIASTOLIC BLOOD PRESSURE: 78 MMHG | SYSTOLIC BLOOD PRESSURE: 116 MMHG | HEART RATE: 68 BPM | RESPIRATION RATE: 18 BRPM

## 2023-08-26 DIAGNOSIS — Z90.49 ACQUIRED ABSENCE OF OTHER SPECIFIED PARTS OF DIGESTIVE TRACT: Chronic | ICD-10-CM

## 2023-08-26 DIAGNOSIS — Z95.1 PRESENCE OF AORTOCORONARY BYPASS GRAFT: Chronic | ICD-10-CM

## 2023-08-26 DIAGNOSIS — Z98.890 OTHER SPECIFIED POSTPROCEDURAL STATES: Chronic | ICD-10-CM

## 2023-08-26 LAB
ALBUMIN SERPL ELPH-MCNC: 3.6 G/DL — SIGNIFICANT CHANGE UP (ref 3.3–5)
ALP SERPL-CCNC: 93 U/L — SIGNIFICANT CHANGE UP (ref 40–120)
ALT FLD-CCNC: 52 U/L — HIGH (ref 10–45)
ANION GAP SERPL CALC-SCNC: 9 MMOL/L — SIGNIFICANT CHANGE UP (ref 5–17)
AST SERPL-CCNC: 30 U/L — SIGNIFICANT CHANGE UP (ref 10–40)
BASOPHILS # BLD AUTO: 0.08 K/UL — SIGNIFICANT CHANGE UP (ref 0–0.2)
BASOPHILS NFR BLD AUTO: 0.9 % — SIGNIFICANT CHANGE UP (ref 0–2)
BILIRUB SERPL-MCNC: 0.3 MG/DL — SIGNIFICANT CHANGE UP (ref 0.2–1.2)
BUN SERPL-MCNC: 42 MG/DL — HIGH (ref 7–23)
CALCIUM SERPL-MCNC: 11 MG/DL — HIGH (ref 8.4–10.5)
CHLORIDE SERPL-SCNC: 105 MMOL/L — SIGNIFICANT CHANGE UP (ref 96–108)
CO2 SERPL-SCNC: 25 MMOL/L — SIGNIFICANT CHANGE UP (ref 22–31)
CREAT SERPL-MCNC: 2.11 MG/DL — HIGH (ref 0.5–1.3)
EGFR: 32 ML/MIN/1.73M2 — LOW
EOSINOPHIL # BLD AUTO: 0.22 K/UL — SIGNIFICANT CHANGE UP (ref 0–0.5)
EOSINOPHIL NFR BLD AUTO: 2.6 % — SIGNIFICANT CHANGE UP (ref 0–6)
GLUCOSE SERPL-MCNC: 110 MG/DL — HIGH (ref 70–99)
HCT VFR BLD CALC: 44 % — SIGNIFICANT CHANGE UP (ref 39–50)
HGB BLD-MCNC: 14.1 G/DL — SIGNIFICANT CHANGE UP (ref 13–17)
IMM GRANULOCYTES NFR BLD AUTO: 0.4 % — SIGNIFICANT CHANGE UP (ref 0–0.9)
LYMPHOCYTES # BLD AUTO: 1.81 K/UL — SIGNIFICANT CHANGE UP (ref 1–3.3)
LYMPHOCYTES # BLD AUTO: 21.3 % — SIGNIFICANT CHANGE UP (ref 13–44)
MCHC RBC-ENTMCNC: 30.4 PG — SIGNIFICANT CHANGE UP (ref 27–34)
MCHC RBC-ENTMCNC: 32 GM/DL — SIGNIFICANT CHANGE UP (ref 32–36)
MCV RBC AUTO: 94.8 FL — SIGNIFICANT CHANGE UP (ref 80–100)
MONOCYTES # BLD AUTO: 0.89 K/UL — SIGNIFICANT CHANGE UP (ref 0–0.9)
MONOCYTES NFR BLD AUTO: 10.5 % — SIGNIFICANT CHANGE UP (ref 2–14)
NEUTROPHILS # BLD AUTO: 5.45 K/UL — SIGNIFICANT CHANGE UP (ref 1.8–7.4)
NEUTROPHILS NFR BLD AUTO: 64.3 % — SIGNIFICANT CHANGE UP (ref 43–77)
NRBC # BLD: 0 /100 WBCS — SIGNIFICANT CHANGE UP (ref 0–0)
PLATELET # BLD AUTO: 163 K/UL — SIGNIFICANT CHANGE UP (ref 150–400)
POTASSIUM SERPL-MCNC: 5.3 MMOL/L — SIGNIFICANT CHANGE UP (ref 3.5–5.3)
POTASSIUM SERPL-SCNC: 5.3 MMOL/L — SIGNIFICANT CHANGE UP (ref 3.5–5.3)
PROT SERPL-MCNC: 7.9 G/DL — SIGNIFICANT CHANGE UP (ref 6–8.3)
RBC # BLD: 4.64 M/UL — SIGNIFICANT CHANGE UP (ref 4.2–5.8)
RBC # FLD: 13.7 % — SIGNIFICANT CHANGE UP (ref 10.3–14.5)
SODIUM SERPL-SCNC: 139 MMOL/L — SIGNIFICANT CHANGE UP (ref 135–145)
TROPONIN I, HIGH SENSITIVITY RESULT: 13 NG/L — SIGNIFICANT CHANGE UP
WBC # BLD: 8.48 K/UL — SIGNIFICANT CHANGE UP (ref 3.8–10.5)
WBC # FLD AUTO: 8.48 K/UL — SIGNIFICANT CHANGE UP (ref 3.8–10.5)

## 2023-08-26 PROCEDURE — 99285 EMERGENCY DEPT VISIT HI MDM: CPT | Mod: 25

## 2023-08-26 PROCEDURE — 93005 ELECTROCARDIOGRAM TRACING: CPT

## 2023-08-26 PROCEDURE — 80053 COMPREHEN METABOLIC PANEL: CPT

## 2023-08-26 PROCEDURE — 85025 COMPLETE CBC W/AUTO DIFF WBC: CPT

## 2023-08-26 PROCEDURE — 99285 EMERGENCY DEPT VISIT HI MDM: CPT | Mod: FS

## 2023-08-26 PROCEDURE — 71045 X-RAY EXAM CHEST 1 VIEW: CPT | Mod: 26

## 2023-08-26 PROCEDURE — 71045 X-RAY EXAM CHEST 1 VIEW: CPT

## 2023-08-26 PROCEDURE — 93010 ELECTROCARDIOGRAM REPORT: CPT

## 2023-08-26 PROCEDURE — 84484 ASSAY OF TROPONIN QUANT: CPT

## 2023-08-26 PROCEDURE — 36415 COLL VENOUS BLD VENIPUNCTURE: CPT

## 2023-08-26 RX ORDER — EMPAGLIFLOZIN 10 MG/1
1 TABLET, FILM COATED ORAL
Qty: 0 | Refills: 0 | DISCHARGE

## 2023-08-26 RX ORDER — ATORVASTATIN CALCIUM 80 MG/1
1 TABLET, FILM COATED ORAL
Qty: 0 | Refills: 0 | DISCHARGE

## 2023-08-26 RX ORDER — ASPIRIN/CALCIUM CARB/MAGNESIUM 324 MG
1 TABLET ORAL
Qty: 0 | Refills: 0 | DISCHARGE

## 2023-08-26 RX ORDER — FUROSEMIDE 40 MG
1 TABLET ORAL
Qty: 0 | Refills: 0 | DISCHARGE

## 2023-08-26 RX ORDER — TIOTROPIUM BROMIDE 18 UG/1
1 CAPSULE ORAL; RESPIRATORY (INHALATION)
Qty: 0 | Refills: 0 | DISCHARGE

## 2023-08-26 RX ORDER — ALBUTEROL 90 UG/1
2 AEROSOL, METERED ORAL
Qty: 0 | Refills: 0 | DISCHARGE

## 2023-08-26 RX ORDER — AMLODIPINE BESYLATE 2.5 MG/1
1 TABLET ORAL
Qty: 0 | Refills: 0 | DISCHARGE

## 2023-08-26 RX ORDER — INSULIN NPH HUM/REG INSULIN HM 70-30/ML
25 VIAL (ML) SUBCUTANEOUS
Qty: 0 | Refills: 0 | DISCHARGE

## 2023-08-26 RX ORDER — GABAPENTIN 400 MG/1
2 CAPSULE ORAL
Qty: 0 | Refills: 0 | DISCHARGE

## 2023-08-26 RX ORDER — ALOGLIPTIN 12.5 MG/1
1 TABLET, FILM COATED ORAL
Qty: 0 | Refills: 0 | DISCHARGE

## 2023-08-26 RX ORDER — METOPROLOL TARTRATE 50 MG
0.5 TABLET ORAL
Qty: 0 | Refills: 0 | DISCHARGE

## 2023-08-26 RX ORDER — LISINOPRIL 2.5 MG/1
1 TABLET ORAL
Qty: 0 | Refills: 0 | DISCHARGE

## 2023-08-26 RX ORDER — CHOLECALCIFEROL (VITAMIN D3) 125 MCG
2 CAPSULE ORAL
Qty: 0 | Refills: 0 | DISCHARGE

## 2023-08-26 RX ORDER — LACTOBACILLUS ACIDOPHILUS 100MM CELL
1 CAPSULE ORAL
Qty: 0 | Refills: 0 | DISCHARGE

## 2023-08-26 RX ORDER — SODIUM CHLORIDE 9 MG/ML
500 INJECTION INTRAMUSCULAR; INTRAVENOUS; SUBCUTANEOUS ONCE
Refills: 0 | Status: COMPLETED | OUTPATIENT
Start: 2023-08-26 | End: 2023-08-26

## 2023-08-26 RX ORDER — METFORMIN HYDROCHLORIDE 850 MG/1
2 TABLET ORAL
Qty: 0 | Refills: 0 | DISCHARGE

## 2023-08-26 RX ORDER — EZETIMIBE 10 MG/1
1 TABLET ORAL
Qty: 0 | Refills: 0 | DISCHARGE

## 2023-08-26 RX ORDER — INSULIN NPH HUM/REG INSULIN HM 70-30/ML
60 VIAL (ML) SUBCUTANEOUS
Qty: 0 | Refills: 0 | DISCHARGE

## 2023-08-26 RX ORDER — PREGABALIN 225 MG/1
1 CAPSULE ORAL
Qty: 0 | Refills: 0 | DISCHARGE

## 2023-08-26 RX ADMIN — SODIUM CHLORIDE 500 MILLILITER(S): 9 INJECTION INTRAMUSCULAR; INTRAVENOUS; SUBCUTANEOUS at 21:42

## 2023-08-26 NOTE — ED PROVIDER NOTE - OBJECTIVE STATEMENT
79 y/o M With past medical history of hypertension (on amlodipine 5 mg and metoprolol 50 mg), COPD, diabetes, CHF on Lasix, high cholesterol presents to the ED with complaints of hypotension all day.  Patient reports he woke up at 9 AM felt mild lightheadedness, he took his blood pressure medications.  And reports as the day progressed his lightheadedness continued so he decided to come get checked.  He denies any chest pain, shortness of breath, nausea vomiting diarrhea, abdominal pain, fever chills, urinary symptoms, URI symptoms, paresthesias, extremity weakness or all other complaints

## 2023-08-26 NOTE — ED PROVIDER NOTE - PHYSICAL EXAMINATION
Gen: Well appearing in NAD.   Eyes: PERRLA. EOMI  ENT: oral mucosa moist   Head: atraumatic  Heart: s1/s2, RRR  Lung: CTA b/l, no wheezing/rhonchi or rales.  Abd: soft, NT/ND, no rebound or guarding,   Msk: no pedal edema  Neuro: AAO x3, patient moving all extremity equally, no focal neuro deficits noted  Skin: Normal for race.  Psych: Alert and oriented

## 2023-08-26 NOTE — ED PROVIDER NOTE - CLINICAL SUMMARY MEDICAL DECISION MAKING FREE TEXT BOX
77 y/o M With past medical history of hypertension (on amlodipine 5 mg and metoprolol 50 mg), COPD, diabetes, CHF on Lasix, high cholesterol presents to the ED with complaints of hypotension all day.  Patient reports he woke up at 9 AM felt mild lightheadedness, he took his blood pressure medications.  And reports as the day progressed his lightheadedness continued so he decided to come get checked.  He denies any chest pain, shortness of breath, nausea vomiting diarrhea, abdominal pain, fever chills, urinary symptoms, URI symptoms, paresthesias, extremity weakness or all other complaints. PE as noted above. labs pending/CXR pending. Will hydrate and reassess

## 2023-08-26 NOTE — ED PROVIDER NOTE - PATIENT PORTAL LINK FT
You can access the FollowMyHealth Patient Portal offered by [facility] by registering at the following website: [email or web address]. By joining [employer]’s FollowMyHealth portal, you will also be able to view your health information using other applications (apps) compatible with our system.

## 2023-08-26 NOTE — ED PROVIDER NOTE - ATTENDING APP SHARED VISIT CONTRIBUTION OF CARE
This was shared visit with CLIFF. I have reviewed and verified the documentation and independently performed the documented history, exam and mdm  79 y/o M With past medical history of hypertension (on amlodipine 5 mg and metoprolol 50 mg), COPD, diabetes, CHF on Lasix, high cholesterol presents to the ED with complaints of hypotension all day.  Patient reports he woke up at 9 AM felt mild lightheadedness, he took his blood pressure medications.  And reports as the day progressed his lightheadedness continued so he decided to come get checked.  He denies any chest pain, shortness of breath, nausea vomiting diarrhea, abdominal pain, fever chills, urinary symptoms, URI symptoms, paresthesias, extremity weakness or all other complaints

## 2023-08-26 NOTE — ED ADULT NURSE NOTE - NSFALLUNIVINTERV_ED_ALL_ED
Bed/Stretcher in lowest position, wheels locked, appropriate side rails in place/Call bell, personal items and telephone in reach/Instruct patient to call for assistance before getting out of bed/chair/stretcher/Non-slip footwear applied when patient is off stretcher/[place] to call system/Physically safe environment - no spills, clutter or unnecessary equipment/Purposeful proactive rounding/Room/bathroom lighting operational, light cord in reach

## 2023-08-30 NOTE — CHART NOTE - NSCHARTNOTEFT_GEN_A_CORE
SW placed call to patient to discuss and assist with follow up care.  Patient presented to ED on 8/26/23 for hypertension.  Patient declined assistance with follow up care assistance at this time.

## 2024-02-10 NOTE — H&P ADULT - CLICK TO LAUNCH ORM
.
45 y/o M with PMH HLD presenting to the ED s/p syncopal episode.   Vitals stable.  EKG is NSR.  Will obtain labs including trop/d-dimer  Plan for syncope work up    Labs WNL, d-dimer elevated, CTA WNL  No events on tele  CTH is negative  SF syncope negative  Plan for discharge

## 2024-07-25 NOTE — ED ADULT NURSE NOTE - NS ED NURSE LEVEL OF CONSCIOUSNESS SPEECH
I saw and evaluated the patient. I personally obtained the key and critical portions of the history and physical exam or was physically present for key and critical portions performed by the resident/fellow. I reviewed the resident/fellow's documentation and discussed the patient with the resident/fellow. I agree with the resident/fellow's medical decision making as documented in the note.    Venus Byrnes MD       Speaking Coherently

## 2024-09-10 NOTE — ED PROVIDER NOTE - CADM POA URETHRAL CATHETER
Spoke to patient. Patient was calling about her xray results that she received. Informed patient that it is the same as what was discussed at the visit. The CT mentioned, Dr Crowley stated the CT is not necessary as it wont change the course of treatment. Patient verbalized understanding of message and is appreciative of phone call.     No

## 2024-12-11 NOTE — PATIENT PROFILE ADULT - NSTRANSFERBELONGINGSDISPO_GEN_A_NUR
Orders:    Follow Up In Cardiology; Future    
  Orders:    Follow Up In Cardiology; Future    
  Orders:    Referral to Cardiology    ECG 12 lead (Clinic Performed)    Stress Test; Future    Follow Up In Cardiology; Future    
with patient

## 2025-01-17 ENCOUNTER — INPATIENT (INPATIENT)
Facility: HOSPITAL | Age: 80
LOS: 1 days | Discharge: HOME | DRG: 392 | End: 2025-01-19
Attending: STUDENT IN AN ORGANIZED HEALTH CARE EDUCATION/TRAINING PROGRAM | Admitting: FAMILY MEDICINE
Payer: OTHER GOVERNMENT

## 2025-01-17 VITALS
HEART RATE: 97 BPM | SYSTOLIC BLOOD PRESSURE: 157 MMHG | WEIGHT: 205.91 LBS | OXYGEN SATURATION: 99 % | DIASTOLIC BLOOD PRESSURE: 89 MMHG | TEMPERATURE: 99 F | HEIGHT: 72 IN | RESPIRATION RATE: 17 BRPM

## 2025-01-17 DIAGNOSIS — Z90.49 ACQUIRED ABSENCE OF OTHER SPECIFIED PARTS OF DIGESTIVE TRACT: Chronic | ICD-10-CM

## 2025-01-17 DIAGNOSIS — R10.9 UNSPECIFIED ABDOMINAL PAIN: ICD-10-CM

## 2025-01-17 DIAGNOSIS — Z98.890 OTHER SPECIFIED POSTPROCEDURAL STATES: Chronic | ICD-10-CM

## 2025-01-17 DIAGNOSIS — Z95.1 PRESENCE OF AORTOCORONARY BYPASS GRAFT: Chronic | ICD-10-CM

## 2025-01-17 LAB
ALBUMIN SERPL ELPH-MCNC: 3.5 G/DL — SIGNIFICANT CHANGE UP (ref 3.3–5)
ALP SERPL-CCNC: 86 U/L — SIGNIFICANT CHANGE UP (ref 40–120)
ALT FLD-CCNC: 44 U/L — SIGNIFICANT CHANGE UP (ref 10–45)
ANION GAP SERPL CALC-SCNC: 8 MMOL/L — SIGNIFICANT CHANGE UP (ref 5–17)
APPEARANCE UR: CLEAR — SIGNIFICANT CHANGE UP
AST SERPL-CCNC: 24 U/L — SIGNIFICANT CHANGE UP (ref 10–40)
BASOPHILS # BLD AUTO: 0 K/UL — SIGNIFICANT CHANGE UP (ref 0–0.2)
BASOPHILS # BLD AUTO: 0.03 K/UL — SIGNIFICANT CHANGE UP (ref 0–0.2)
BASOPHILS NFR BLD AUTO: 0 % — SIGNIFICANT CHANGE UP (ref 0–2)
BASOPHILS NFR BLD AUTO: 0.4 % — SIGNIFICANT CHANGE UP (ref 0–2)
BILIRUB SERPL-MCNC: 0.8 MG/DL — SIGNIFICANT CHANGE UP (ref 0.2–1.2)
BILIRUB UR-MCNC: NEGATIVE — SIGNIFICANT CHANGE UP
BUN SERPL-MCNC: 31 MG/DL — HIGH (ref 7–23)
CALCIUM SERPL-MCNC: 10.5 MG/DL — SIGNIFICANT CHANGE UP (ref 8.4–10.5)
CHLORIDE SERPL-SCNC: 108 MMOL/L — SIGNIFICANT CHANGE UP (ref 96–108)
CO2 SERPL-SCNC: 26 MMOL/L — SIGNIFICANT CHANGE UP (ref 22–31)
COLOR SPEC: YELLOW — SIGNIFICANT CHANGE UP
CREAT SERPL-MCNC: 1.67 MG/DL — HIGH (ref 0.5–1.3)
DIFF PNL FLD: ABNORMAL
EGFR: 42 ML/MIN/1.73M2 — LOW
EOSINOPHIL # BLD AUTO: 0 K/UL — SIGNIFICANT CHANGE UP (ref 0–0.5)
EOSINOPHIL # BLD AUTO: 0 K/UL — SIGNIFICANT CHANGE UP (ref 0–0.5)
EOSINOPHIL NFR BLD AUTO: 0 % — SIGNIFICANT CHANGE UP (ref 0–6)
EOSINOPHIL NFR BLD AUTO: 0 % — SIGNIFICANT CHANGE UP (ref 0–6)
GLUCOSE SERPL-MCNC: 199 MG/DL — HIGH (ref 70–99)
GLUCOSE UR QL: >=1000 MG/DL
HCT VFR BLD CALC: 46.9 % — SIGNIFICANT CHANGE UP (ref 39–50)
HCT VFR BLD CALC: 49.6 % — SIGNIFICANT CHANGE UP (ref 39–50)
HGB BLD-MCNC: 15.3 G/DL — SIGNIFICANT CHANGE UP (ref 13–17)
HGB BLD-MCNC: 16.8 G/DL — SIGNIFICANT CHANGE UP (ref 13–17)
IMM GRANULOCYTES NFR BLD AUTO: 0.3 % — SIGNIFICANT CHANGE UP (ref 0–0.9)
KETONES UR-MCNC: ABNORMAL MG/DL
LEUKOCYTE ESTERASE UR-ACNC: NEGATIVE — SIGNIFICANT CHANGE UP
LIDOCAIN IGE QN: 22 U/L — SIGNIFICANT CHANGE UP (ref 16–77)
LYMPHOCYTES # BLD AUTO: 0.24 K/UL — LOW (ref 1–3.3)
LYMPHOCYTES # BLD AUTO: 0.77 K/UL — LOW (ref 1–3.3)
LYMPHOCYTES # BLD AUTO: 3.1 % — LOW (ref 13–44)
LYMPHOCYTES # BLD AUTO: 9 % — LOW (ref 13–44)
MANUAL SMEAR VERIFICATION: SIGNIFICANT CHANGE UP
MCHC RBC-ENTMCNC: 30.2 PG — SIGNIFICANT CHANGE UP (ref 27–34)
MCHC RBC-ENTMCNC: 30.8 PG — SIGNIFICANT CHANGE UP (ref 27–34)
MCHC RBC-ENTMCNC: 32.6 G/DL — SIGNIFICANT CHANGE UP (ref 32–36)
MCHC RBC-ENTMCNC: 33.9 G/DL — SIGNIFICANT CHANGE UP (ref 32–36)
MCV RBC AUTO: 91 FL — SIGNIFICANT CHANGE UP (ref 80–100)
MCV RBC AUTO: 92.7 FL — SIGNIFICANT CHANGE UP (ref 80–100)
MONOCYTES # BLD AUTO: 0.51 K/UL — SIGNIFICANT CHANGE UP (ref 0–0.9)
MONOCYTES # BLD AUTO: 0.54 K/UL — SIGNIFICANT CHANGE UP (ref 0–0.9)
MONOCYTES NFR BLD AUTO: 6 % — SIGNIFICANT CHANGE UP (ref 2–14)
MONOCYTES NFR BLD AUTO: 6.9 % — SIGNIFICANT CHANGE UP (ref 2–14)
NEUTROPHILS # BLD AUTO: 6.99 K/UL — SIGNIFICANT CHANGE UP (ref 1.8–7.4)
NEUTROPHILS # BLD AUTO: 7.28 K/UL — SIGNIFICANT CHANGE UP (ref 1.8–7.4)
NEUTROPHILS NFR BLD AUTO: 75 % — SIGNIFICANT CHANGE UP (ref 43–77)
NEUTROPHILS NFR BLD AUTO: 89.3 % — HIGH (ref 43–77)
NEUTS BAND # BLD: 10 % — HIGH (ref 0–8)
NEUTS BAND NFR BLD: 10 % — HIGH (ref 0–8)
NITRITE UR-MCNC: NEGATIVE — SIGNIFICANT CHANGE UP
NRBC # BLD: 0 /100 WBCS — SIGNIFICANT CHANGE UP (ref 0–0)
NRBC # BLD: 0 /100 WBCS — SIGNIFICANT CHANGE UP (ref 0–0)
NRBC BLD-RTO: 0 /100 WBCS — SIGNIFICANT CHANGE UP (ref 0–0)
NRBC BLD-RTO: 0 /100 WBCS — SIGNIFICANT CHANGE UP (ref 0–0)
PH UR: 5 — SIGNIFICANT CHANGE UP (ref 5–8)
PLAT MORPH BLD: NORMAL — SIGNIFICANT CHANGE UP
PLATELET # BLD AUTO: 143 K/UL — LOW (ref 150–400)
PLATELET # BLD AUTO: 147 K/UL — LOW (ref 150–400)
POTASSIUM SERPL-MCNC: 5 MMOL/L — SIGNIFICANT CHANGE UP (ref 3.5–5.3)
POTASSIUM SERPL-SCNC: 5 MMOL/L — SIGNIFICANT CHANGE UP (ref 3.5–5.3)
PROT SERPL-MCNC: 7.7 G/DL — SIGNIFICANT CHANGE UP (ref 6–8.3)
PROT UR-MCNC: 100 MG/DL
RBC # BLD: 5.06 M/UL — SIGNIFICANT CHANGE UP (ref 4.2–5.8)
RBC # BLD: 5.45 M/UL — SIGNIFICANT CHANGE UP (ref 4.2–5.8)
RBC # FLD: 13.2 % — SIGNIFICANT CHANGE UP (ref 10.3–14.5)
RBC # FLD: 13.4 % — SIGNIFICANT CHANGE UP (ref 10.3–14.5)
RBC BLD AUTO: NORMAL — SIGNIFICANT CHANGE UP
SODIUM SERPL-SCNC: 142 MMOL/L — SIGNIFICANT CHANGE UP (ref 135–145)
SP GR SPEC: 1.03 — SIGNIFICANT CHANGE UP (ref 1–1.03)
UROBILINOGEN FLD QL: 0.2 MG/DL — SIGNIFICANT CHANGE UP (ref 0.2–1)
WBC # BLD: 7.82 K/UL — SIGNIFICANT CHANGE UP (ref 3.8–10.5)
WBC # BLD: 8.56 K/UL — SIGNIFICANT CHANGE UP (ref 3.8–10.5)
WBC # FLD AUTO: 7.82 K/UL — SIGNIFICANT CHANGE UP (ref 3.8–10.5)
WBC # FLD AUTO: 8.56 K/UL — SIGNIFICANT CHANGE UP (ref 3.8–10.5)

## 2025-01-17 PROCEDURE — 74176 CT ABD & PELVIS W/O CONTRAST: CPT | Mod: 26

## 2025-01-17 PROCEDURE — 99223 1ST HOSP IP/OBS HIGH 75: CPT

## 2025-01-17 PROCEDURE — 99285 EMERGENCY DEPT VISIT HI MDM: CPT

## 2025-01-17 PROCEDURE — 71045 X-RAY EXAM CHEST 1 VIEW: CPT | Mod: 26

## 2025-01-17 PROCEDURE — 93010 ELECTROCARDIOGRAM REPORT: CPT

## 2025-01-17 RX ORDER — BACTERIOSTATIC SODIUM CHLORIDE 0.9 %
1000 VIAL (ML) INJECTION ONCE
Refills: 0 | Status: COMPLETED | OUTPATIENT
Start: 2025-01-17 | End: 2025-01-17

## 2025-01-17 RX ORDER — MAGNESIUM, ALUMINUM HYDROXIDE 200-225/5
30 SUSPENSION, ORAL (FINAL DOSE FORM) ORAL EVERY 4 HOURS
Refills: 0 | Status: DISCONTINUED | OUTPATIENT
Start: 2025-01-17 | End: 2025-01-19

## 2025-01-17 RX ORDER — METOPROLOL SUCCINATE 25 MG
50 TABLET, EXTENDED RELEASE 24 HR ORAL DAILY
Refills: 0 | Status: DISCONTINUED | OUTPATIENT
Start: 2025-01-17 | End: 2025-01-19

## 2025-01-17 RX ORDER — PANTOPRAZOLE 20 MG/1
40 TABLET, DELAYED RELEASE ORAL DAILY
Refills: 0 | Status: DISCONTINUED | OUTPATIENT
Start: 2025-01-17 | End: 2025-01-19

## 2025-01-17 RX ORDER — ONDANSETRON 4 MG/1
4 TABLET, ORALLY DISINTEGRATING ORAL ONCE
Refills: 0 | Status: COMPLETED | OUTPATIENT
Start: 2025-01-17 | End: 2025-01-17

## 2025-01-17 RX ORDER — EZETIMIBE 10 MG
10 TABLET ORAL DAILY
Refills: 0 | Status: DISCONTINUED | OUTPATIENT
Start: 2025-01-17 | End: 2025-01-18

## 2025-01-17 RX ORDER — ATORVASTATIN CALCIUM 80 MG/1
80 TABLET, FILM COATED ORAL AT BEDTIME
Refills: 0 | Status: DISCONTINUED | OUTPATIENT
Start: 2025-01-17 | End: 2025-01-19

## 2025-01-17 RX ORDER — ONDANSETRON 4 MG/1
4 TABLET, ORALLY DISINTEGRATING ORAL EVERY 8 HOURS
Refills: 0 | Status: DISCONTINUED | OUTPATIENT
Start: 2025-01-17 | End: 2025-01-19

## 2025-01-17 RX ORDER — FAMOTIDINE 10 MG/ML
20 INJECTION INTRAVENOUS ONCE
Refills: 0 | Status: COMPLETED | OUTPATIENT
Start: 2025-01-17 | End: 2025-01-17

## 2025-01-17 RX ORDER — ACETAMINOPHEN 160 MG/5ML
650 SUSPENSION ORAL EVERY 6 HOURS
Refills: 0 | Status: DISCONTINUED | OUTPATIENT
Start: 2025-01-17 | End: 2025-01-19

## 2025-01-17 RX ORDER — METOCLOPRAMIDE 10 MG/1
10 TABLET ORAL ONCE
Refills: 0 | Status: COMPLETED | OUTPATIENT
Start: 2025-01-17 | End: 2025-01-17

## 2025-01-17 RX ORDER — ACETAMINOPHEN, DIPHENHYDRAMINE HCL, PHENYLEPHRINE HCL 325; 25; 5 MG/1; MG/1; MG/1
3 TABLET ORAL AT BEDTIME
Refills: 0 | Status: DISCONTINUED | OUTPATIENT
Start: 2025-01-17 | End: 2025-01-19

## 2025-01-17 RX ORDER — GABAPENTIN 800 MG/1
1 TABLET ORAL
Refills: 0 | DISCHARGE

## 2025-01-17 RX ORDER — METOCLOPRAMIDE 10 MG/1
5 TABLET ORAL EVERY 6 HOURS
Refills: 0 | Status: DISCONTINUED | OUTPATIENT
Start: 2025-01-17 | End: 2025-01-18

## 2025-01-17 RX ORDER — GABAPENTIN 800 MG/1
600 TABLET ORAL DAILY
Refills: 0 | Status: DISCONTINUED | OUTPATIENT
Start: 2025-01-17 | End: 2025-01-19

## 2025-01-17 RX ORDER — SODIUM CHLORIDE 9 G/ML
1000 INJECTION, SOLUTION INTRAVENOUS
Refills: 0 | Status: DISCONTINUED | OUTPATIENT
Start: 2025-01-17 | End: 2025-01-19

## 2025-01-17 RX ORDER — ALBUTEROL 90 MCG
2 AEROSOL REFILL (GRAM) INHALATION EVERY 6 HOURS
Refills: 0 | Status: DISCONTINUED | OUTPATIENT
Start: 2025-01-17 | End: 2025-01-19

## 2025-01-17 RX ORDER — ASPIRIN 81 MG/1
81 TABLET, COATED ORAL DAILY
Refills: 0 | Status: DISCONTINUED | OUTPATIENT
Start: 2025-01-17 | End: 2025-01-19

## 2025-01-17 RX ADMIN — Medication 1000 MILLILITER(S): at 14:05

## 2025-01-17 RX ADMIN — FAMOTIDINE 20 MILLIGRAM(S): 10 INJECTION INTRAVENOUS at 13:06

## 2025-01-17 RX ADMIN — Medication 30 MILLILITER(S): at 23:05

## 2025-01-17 RX ADMIN — PANTOPRAZOLE 40 MILLIGRAM(S): 20 TABLET, DELAYED RELEASE ORAL at 18:17

## 2025-01-17 RX ADMIN — ATORVASTATIN CALCIUM 80 MILLIGRAM(S): 80 TABLET, FILM COATED ORAL at 22:58

## 2025-01-17 RX ADMIN — METOCLOPRAMIDE 10 MILLIGRAM(S): 10 TABLET ORAL at 16:33

## 2025-01-17 RX ADMIN — ONDANSETRON 4 MILLIGRAM(S): 4 TABLET, ORALLY DISINTEGRATING ORAL at 13:06

## 2025-01-17 RX ADMIN — Medication 1000 MILLILITER(S): at 13:05

## 2025-01-17 NOTE — H&P ADULT - HISTORY OF PRESENT ILLNESS
77 yo man with history of CAD s/p CABG, Diabetes Mellitus II, Hypertension, Hyperlipidemia, and history CHF comes to ED nausea and diarrhea x 24 hrs, no blood in the stool. with generalized abdominal discomfort, no fever, no sob, no cp, no dysuria.   ed course-  Normal white count.  Bands 10%? creat 1.6 appears baseline   CT demonstrates fluid-filled stomach and small bowel loops without signs of obstruction. received Pepcid Zofran, reglan and p.o. challenge however patient states still feels as if his food is not going down.    Did have diarrheal episode in the emergency department for which a stool sample was sent.

## 2025-01-17 NOTE — ED PROVIDER NOTE - OBJECTIVE STATEMENT
80-year-old male presents emergency department with 1 day of vomiting diarrhea and abdominal cramping.  No fever.  Unable to take his home medication due to inability to tolerate PO. PMH DM HTN HLD CAD CHF CKD. PMH Stomach cancer, appendectomy, CABG, Cholecystectomy. Family/daughter at bedside. Denies fever/chestpain/ SOB.

## 2025-01-17 NOTE — ED ADULT NURSE NOTE - NS ED NURSE PATIENT LEFT UNIT TIME
"Subjective:      Mando Salguero is a 29 y.o. male who presents with Follow-Up      DOI 6/29/2017. Mechanism of injury-twisted ankle on stairs, no hazard. 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 Patient states that the pain overall seems not same. It is  on the lateral aspect of the ankle. He states the swelling seems about the same as well. He does not that it is better in the sense that he is able to bear weight with it better and walk a little bit more. He notes some numbness and tingling distal second toe. He is using the Aircast splint and ibuprofen as needed.     HPI    Review of Systems   Constitutional: Negative for fever.   Skin: Negative for rash.   Neurological: Positive for tingling. Negative for focal weakness.     SOCHX: Works as a  at the [employer]  FH: No pertinent family history to this problem.         Objective:     /72 mmHg  Pulse 92  Temp(Src) 37.5 °C (99.5 °F)  Ht 1.956 m (6' 5.01\")  Wt 101.152 kg (223 lb)  BMI 26.44 kg/m2  SpO2 92%     Physical Exam   Constitutional: He is oriented to person, place, and time. He appears well-developed and well-nourished.   Cardiovascular: Normal rate.    Pulmonary/Chest: Effort normal. No respiratory distress.   Neurological: He is alert and oriented to person, place, and time.   Skin: Skin is warm and dry.   Psychiatric: He has a normal mood and affect. Judgment normal.       Right ankle: Pulling ecchymosis distal foot. Mild swelling lateral aspect of ankle. Tenderness to palpation A TFL and distal fibula. Full range of motion with good strength. Slightly antalgic.       Assessment/Plan:     1. Sprain of right ankle, unspecified ligament, subsequent encounter  Continue Aircast splint  Continue ice and ibuprofen as needed  Restricted duty  Follow-up 1.5 weeks        "
17:56

## 2025-01-17 NOTE — H&P ADULT - NSHPPHYSICALEXAM_GEN_ALL_CORE
Vital Signs Last 24 Hrs  T(C): 37 (17 Jan 2025 12:40), Max: 37 (17 Jan 2025 12:40)  T(F): 98.6 (17 Jan 2025 12:40), Max: 98.6 (17 Jan 2025 12:40)  HR: 97 (17 Jan 2025 12:40) (97 - 97)  BP: 157/89 (17 Jan 2025 12:40) (157/89 - 157/89)  BP(mean): --  RR: 17 (17 Jan 2025 12:40) (17 - 17)  SpO2: 99% (17 Jan 2025 12:40) (99% - 99%)    Parameters below as of 17 Jan 2025 12:40  Patient On (Oxygen Delivery Method): room air    GENERAL- NAD  EAR/NOSE/MOUTH/THROAT -  MMM  EYES- CHAPARRITA, conjunctiva and Sclera clear  NECK- supple  RESPIRATORY-  clear to auscultation bilaterally  CARDIOVASCULAR - SIS2, RRR  GI - soft NT BS present  EXTREMITIES- no pedal edema  NEUROLOGY- no gross focal deficits  PSYCHIATRY- AAO X 3

## 2025-01-17 NOTE — PATIENT PROFILE ADULT - FALL HARM RISK - HARM RISK INTERVENTIONS

## 2025-01-17 NOTE — H&P ADULT - NSHPLABSRESULTS_GEN_ALL_CORE
16.8                 142  | 26   | 31           8.56  >-----------< 147     ------------------------< 199                   49.6                 5.0  | 108  | 1.67                                         Ca 10.5  Mg x     Ph x          CAPILLARY BLOOD GLUCOSE      A1C with Estimated Average Glucose Result: 6.8: Method: Immunoassay       Reference Range                4.0-5.6%       High risk (prediabetic)        5.7-6.4%       Diabetic, diagnostic             >=6.5%       ADA diabetic treatment goal       <7.0%  The Hemoglobin A1c testing is NGSP-certified.Reference ranges are based  upon the 2010 recommendations of  the American Diabetes Association.  Interpretation may vary for children  and adolescents. % (03.11.23 @ 07:15)    Labs reviewed:     CXR personally reviewed: napd    < from: CT Abdomen and Pelvis No Cont (01.17.25 @ 14:17) >      IMPRESSION:  Fluid-filled stomach and small bowel loops without evidence of   obstruction or gastroenteritis.    < end of copied text >    < from: CT Abdomen and Pelvis No Cont (01.17.25 @ 14:17) >      IMPRESSION:  Fluid-filled stomach and small bowel loops without evidence of   obstruction or gastroenteritis.    < end of copied text >

## 2025-01-17 NOTE — ED ADULT NURSE NOTE - OBJECTIVE STATEMENT
pt presents to ED with complaint of diarrhea x 4 times, vomiting x 3 , abdominal pain, cough, chills x 1 day. Alert and oriented x 4. pt states the symptoms started a few hours after trying a new crab restaurant that he thinks the food wasn't good at home. pt is on ASA. PMH of DM2, HTN, CAD, CKD.

## 2025-01-17 NOTE — ED PROVIDER NOTE - CLINICAL SUMMARY MEDICAL DECISION MAKING FREE TEXT BOX
80-year-old male presents emergency department with 1 day of vomiting diarrhea and abdominal cramping.  No fever.  Normal white count.  Bands 10%?  CT demonstrates fluid-filled stomach and small bowel loops without signs of obstruction.  Consider gastroenteritis?  Given p.o. challenge after patient reported feeling improved after Pepcid Zofran however patient states still feels as if his food is not going down.  Did have diarrheal episode in the emergency department for which a stool sample was sent.  Discussed with patient and shared decision making led to patient as agreeable to admission.  Will repeat CBC.  Discussed with hospitalist for admission.

## 2025-01-17 NOTE — ED ADULT TRIAGE NOTE - CHIEF COMPLAINT QUOTE
PAtient presents to ED with complaint of diarrhea x 4 times, vomiting x 3 , abdominal pain, cough, chills x 1 day. Alert and oriented x 4.

## 2025-01-17 NOTE — H&P ADULT - ASSESSMENT
77 yo man with history of stomach cancer 2007 treated with surgery and chemo, bladder cancer about 5 yr back treated with surgery and chemo , CAD s/p CABG, Diabetes Mellitus 2 Hypertension, Hyperlipidemia, and history CHF comes to ED nausea and diarrhea x 24 hrs, no blood in the stool. with generalized abdominal discomfort, no fever, no sob, no cp, no dysuria.   ed course-  Normal white count.  Bands 10%? creat 1.6 appears baseline   CT demonstrates fluid-filled stomach and small bowel loops without signs of obstruction. received Pepcid Zofran, reglan and p.o. challenge however patient states still feels as if his food is not going down.    Did have diarrheal episode in the emergency department for which a stool sample was sent.     abdominal discomfort nausea likely due to gastroparesis vs gastroenteritis  decreased po intake  ctap- Fluid-filled stomach and small bowel loops , no obstruction  admit to medicine  liquid diet  gentle hydration  c/w prn reglan  zofran prn  stool studies ordered  ppi  surgery consult - dr. deras texted     ? abnormal cbcd- showing bands-   repeating stst  no leukocytosis, no fever  send BC X 2    Emphysema - Chronic   albuterol PRN     Diabetes Mellitus 2   iss, Accu-Cheks   lantus 10   hold home 70/30 , resume on d/c   A1C with Estimated Average Glucose Result: 6.8 % (03.11.23 @ 07:15)      Hypertension  metoprolol, lisinopril   not on lasix anymore    Hyperlipidemia   statin and ezetimibe    DOUGLAS on CKD 3  Baseline SCr near 1.6  ivf  monitor    BPH- finasteride     DVT Prophylaxis -PAS    Daughter at the bedside 77 yo man with history of stomach cancer 2007 treated with surgery and chemo, bladder cancer about 5 yr back treated with surgery and chemo , CAD s/p CABG, Diabetes Mellitus 2 Hypertension, Hyperlipidemia, and history CHF comes to ED nausea and diarrhea x 24 hrs, no blood in the stool. with generalized abdominal discomfort, no fever, no sob, no cp, no dysuria.   ed course-  Normal white count.  Bands 10%? creat 1.6 appears baseline   CT demonstrates fluid-filled stomach and small bowel loops without signs of obstruction. received Pepcid Zofran, reglan and p.o. challenge however patient states still feels as if his food is not going down.    Did have diarrheal episode in the emergency department for which a stool sample was sent.     abdominal discomfort nausea likely due to gastroparesis vs gastroenteritis vs ileus   decreased po intake  ctap- Fluid-filled stomach and small bowel loops , no obstruction  admit to medicine  liquid diet  gentle hydration  c/w prn reglan  zofran prn  stool studies ordered  ppi  surgery consult - dr. deras texted - suggesting GI input   gi consulted- dr. christopher     ? abnormal cbcd- showing bands-   repeating stst  no leukocytosis, no fever  send BC X 2    Emphysema - Chronic   albuterol PRN     Diabetes Mellitus 2   iss, Accu-Cheks   lantus 10   hold home 70/30 , resume on d/c   A1C with Estimated Average Glucose Result: 6.8 % (03.11.23 @ 07:15)      Hypertension  metoprolol, lisinopril   not on lasix anymore    Hyperlipidemia   statin and ezetimibe    DOUGLAS on CKD 3  Baseline SCr near 1.6  ivf  monitor    BPH- finasteride     DVT Prophylaxis -PAS    Daughter at the bedside 79 yo man with history of stomach cancer 2007 treated with surgery and chemo, bladder cancer about 5 yr back treated with surgery and chemo , CAD s/p CABG, Diabetes Mellitus 2 Hypertension, Hyperlipidemia, and history CHF comes to ED nausea and diarrhea x 24 hrs, no blood in the stool. with generalized abdominal discomfort, no fever, no sob, no cp, no dysuria.   ed course-  Normal white count.  Bands 10%? creat 1.6 appears baseline   CT demonstrates fluid-filled stomach and small bowel loops without signs of obstruction. received Pepcid Zofran, reglan and p.o. challenge however patient states still feels as if his food is not going down.    Did have diarrheal episode in the emergency department for which a stool sample was sent.     abdominal discomfort nausea likely due to gastroparesis vs gastroenteritis vs ileus   decreased po intake  ctap- Fluid-filled stomach and small bowel loops , no obstruction  admit to medicine  liquid diet  gentle hydration  c/w prn reglan  zofran prn  stool studies ordered  ppi  surgery consult - dr. deras texted - suggesting GI input   gi consulted- dr. christopher     ? abnormal cbcd- showing bands-   repeat cbcd not showing bands  no leukocytosis, no fever  send BC X 2    Emphysema - Chronic   albuterol PRN     Diabetes Mellitus 2   iss, Accu-Cheks   lantus 10   hold home 70/30 , resume on d/c   A1C with Estimated Average Glucose Result: 6.8 % (03.11.23 @ 07:15)      Hypertension  metoprolol, lisinopril   not on lasix anymore    Hyperlipidemia   statin and ezetimibe    DOUGLAS on CKD 3  Baseline SCr near 1.6  ivf  monitor    BPH- finasteride     DVT Prophylaxis -PAS    Daughter at the bedside

## 2025-01-17 NOTE — ED PROVIDER NOTE - PHYSICAL EXAMINATION
Vitals: I have reviewed the patients vital signs  General: nontoxic appearing  HEENT: Atraumatic, normocephalic, airway patent  Eyes: EOMI, tracking appropriately  Neck: no tracheal deviation  Chest/Lungs: no trauma, symmetric chest rise, speaking in complete sentences,  no resp distress, clear lungs b/l  Heart: skin and extremities well perfused, regular rate and rhythm  Abd: soft NT ND - perhaps mild epigastric tenderness.   Neuro: A+Ox3, ambulating without difficulty, appears non focal  MSK: strength at baseline in all extremities, no muscle wasting or atrophy  Skin: no cyanosis, no jaundice

## 2025-01-18 ENCOUNTER — TRANSCRIPTION ENCOUNTER (OUTPATIENT)
Age: 80
End: 2025-01-18

## 2025-01-18 DIAGNOSIS — A08.11 ACUTE GASTROENTEROPATHY DUE TO NORWALK AGENT: ICD-10-CM

## 2025-01-18 DIAGNOSIS — K56.7 ILEUS, UNSPECIFIED: ICD-10-CM

## 2025-01-18 LAB
ALBUMIN SERPL ELPH-MCNC: 3.1 G/DL — LOW (ref 3.3–5)
ALP SERPL-CCNC: 74 U/L — SIGNIFICANT CHANGE UP (ref 40–120)
ALT FLD-CCNC: 39 U/L — SIGNIFICANT CHANGE UP (ref 10–45)
ANION GAP SERPL CALC-SCNC: 11 MMOL/L — SIGNIFICANT CHANGE UP (ref 5–17)
AST SERPL-CCNC: 25 U/L — SIGNIFICANT CHANGE UP (ref 10–40)
BACTERIA # UR AUTO: NEGATIVE /HPF — SIGNIFICANT CHANGE UP
BASOPHILS # BLD AUTO: 0.02 K/UL — SIGNIFICANT CHANGE UP (ref 0–0.2)
BASOPHILS NFR BLD AUTO: 0.3 % — SIGNIFICANT CHANGE UP (ref 0–2)
BILIRUB SERPL-MCNC: 0.8 MG/DL — SIGNIFICANT CHANGE UP (ref 0.2–1.2)
BUN SERPL-MCNC: 26 MG/DL — HIGH (ref 7–23)
CALCIUM SERPL-MCNC: 9.7 MG/DL — SIGNIFICANT CHANGE UP (ref 8.4–10.5)
CHLORIDE SERPL-SCNC: 105 MMOL/L — SIGNIFICANT CHANGE UP (ref 96–108)
CO2 SERPL-SCNC: 22 MMOL/L — SIGNIFICANT CHANGE UP (ref 22–31)
CREAT SERPL-MCNC: 1.57 MG/DL — HIGH (ref 0.5–1.3)
EGFR: 45 ML/MIN/1.73M2 — LOW
EOSINOPHIL # BLD AUTO: 0 K/UL — SIGNIFICANT CHANGE UP (ref 0–0.5)
EOSINOPHIL NFR BLD AUTO: 0 % — SIGNIFICANT CHANGE UP (ref 0–6)
EPI CELLS # UR: SIGNIFICANT CHANGE UP
GI PCR PANEL: DETECTED
GLUCOSE BLDC GLUCOMTR-MCNC: 134 MG/DL — HIGH (ref 70–99)
GLUCOSE BLDC GLUCOMTR-MCNC: 145 MG/DL — HIGH (ref 70–99)
GLUCOSE SERPL-MCNC: 172 MG/DL — HIGH (ref 70–99)
HCT VFR BLD CALC: 45.1 % — SIGNIFICANT CHANGE UP (ref 39–50)
HGB BLD-MCNC: 15 G/DL — SIGNIFICANT CHANGE UP (ref 13–17)
HYALINE CASTS # UR AUTO: SIGNIFICANT CHANGE UP
IMM GRANULOCYTES NFR BLD AUTO: 0.1 % — SIGNIFICANT CHANGE UP (ref 0–0.9)
LYMPHOCYTES # BLD AUTO: 0.59 K/UL — LOW (ref 1–3.3)
LYMPHOCYTES # BLD AUTO: 8.7 % — LOW (ref 13–44)
MCHC RBC-ENTMCNC: 30.3 PG — SIGNIFICANT CHANGE UP (ref 27–34)
MCHC RBC-ENTMCNC: 33.3 G/DL — SIGNIFICANT CHANGE UP (ref 32–36)
MCV RBC AUTO: 91.1 FL — SIGNIFICANT CHANGE UP (ref 80–100)
MONOCYTES # BLD AUTO: 0.8 K/UL — SIGNIFICANT CHANGE UP (ref 0–0.9)
MONOCYTES NFR BLD AUTO: 11.8 % — SIGNIFICANT CHANGE UP (ref 2–14)
NEUTROPHILS # BLD AUTO: 5.36 K/UL — SIGNIFICANT CHANGE UP (ref 1.8–7.4)
NEUTROPHILS NFR BLD AUTO: 79.1 % — HIGH (ref 43–77)
NOROVIRUS GI+II RNA STL QL NAA+NON-PROBE: DETECTED
NRBC # BLD: 0 /100 WBCS — SIGNIFICANT CHANGE UP (ref 0–0)
NRBC BLD-RTO: 0 /100 WBCS — SIGNIFICANT CHANGE UP (ref 0–0)
PHOSPHATE SERPL-MCNC: 2.1 MG/DL — LOW (ref 2.5–4.5)
PLATELET # BLD AUTO: 141 K/UL — LOW (ref 150–400)
POTASSIUM SERPL-MCNC: 4.1 MMOL/L — SIGNIFICANT CHANGE UP (ref 3.5–5.3)
POTASSIUM SERPL-SCNC: 4.1 MMOL/L — SIGNIFICANT CHANGE UP (ref 3.5–5.3)
PROT SERPL-MCNC: 7 G/DL — SIGNIFICANT CHANGE UP (ref 6–8.3)
RBC # BLD: 4.95 M/UL — SIGNIFICANT CHANGE UP (ref 4.2–5.8)
RBC # FLD: 13.5 % — SIGNIFICANT CHANGE UP (ref 10.3–14.5)
RBC CASTS # UR COMP ASSIST: 0 /HPF — SIGNIFICANT CHANGE UP (ref 0–4)
SODIUM SERPL-SCNC: 138 MMOL/L — SIGNIFICANT CHANGE UP (ref 135–145)
WBC # BLD: 6.78 K/UL — SIGNIFICANT CHANGE UP (ref 3.8–10.5)
WBC # FLD AUTO: 6.78 K/UL — SIGNIFICANT CHANGE UP (ref 3.8–10.5)
WBC UR QL: 0 /HPF — SIGNIFICANT CHANGE UP (ref 0–5)

## 2025-01-18 PROCEDURE — 99233 SBSQ HOSP IP/OBS HIGH 50: CPT | Mod: GC

## 2025-01-18 PROCEDURE — 99223 1ST HOSP IP/OBS HIGH 75: CPT

## 2025-01-18 RX ORDER — DM/PSEUDOEPHED/ACETAMINOPHEN 10-30-250
12.5 CAPSULE ORAL ONCE
Refills: 0 | Status: DISCONTINUED | OUTPATIENT
Start: 2025-01-18 | End: 2025-01-19

## 2025-01-18 RX ORDER — DM/PSEUDOEPHED/ACETAMINOPHEN 10-30-250
25 CAPSULE ORAL ONCE
Refills: 0 | Status: DISCONTINUED | OUTPATIENT
Start: 2025-01-18 | End: 2025-01-19

## 2025-01-18 RX ORDER — DM/PSEUDOEPHED/ACETAMINOPHEN 10-30-250
15 CAPSULE ORAL ONCE
Refills: 0 | Status: DISCONTINUED | OUTPATIENT
Start: 2025-01-18 | End: 2025-01-19

## 2025-01-18 RX ORDER — SODIUM CHLORIDE 9 G/ML
1000 INJECTION, SOLUTION INTRAVENOUS
Refills: 0 | Status: DISCONTINUED | OUTPATIENT
Start: 2025-01-18 | End: 2025-01-19

## 2025-01-18 RX ORDER — GLUCAGON 3 MG/1
1 POWDER NASAL ONCE
Refills: 0 | Status: DISCONTINUED | OUTPATIENT
Start: 2025-01-18 | End: 2025-01-19

## 2025-01-18 RX ORDER — INSULIN LISPRO 100/ML
VIAL (ML) SUBCUTANEOUS
Refills: 0 | Status: DISCONTINUED | OUTPATIENT
Start: 2025-01-18 | End: 2025-01-19

## 2025-01-18 RX ORDER — INSULIN LISPRO 100/ML
VIAL (ML) SUBCUTANEOUS AT BEDTIME
Refills: 0 | Status: DISCONTINUED | OUTPATIENT
Start: 2025-01-18 | End: 2025-01-19

## 2025-01-18 RX ORDER — SODIUM PHOSPHATE, DIBASIC, ANHYDROUS, POTASSIUM PHOSPHATE, MONOBASIC, AND SODIUM PHOSPHATE, MONOBASIC, MONOHYDRATE 852; 155; 130 MG/1; MG/1; MG/1
1 TABLET, COATED ORAL ONCE
Refills: 0 | Status: COMPLETED | OUTPATIENT
Start: 2025-01-18 | End: 2025-01-18

## 2025-01-18 RX ADMIN — GABAPENTIN 600 MILLIGRAM(S): 800 TABLET ORAL at 11:35

## 2025-01-18 RX ADMIN — SODIUM PHOSPHATE, DIBASIC, ANHYDROUS, POTASSIUM PHOSPHATE, MONOBASIC, AND SODIUM PHOSPHATE, MONOBASIC, MONOHYDRATE 1 PACKET(S): 852; 155; 130 TABLET, COATED ORAL at 13:20

## 2025-01-18 RX ADMIN — PANTOPRAZOLE 40 MILLIGRAM(S): 20 TABLET, DELAYED RELEASE ORAL at 11:35

## 2025-01-18 RX ADMIN — Medication 40 MILLIGRAM(S): at 05:36

## 2025-01-18 RX ADMIN — ATORVASTATIN CALCIUM 80 MILLIGRAM(S): 80 TABLET, FILM COATED ORAL at 21:49

## 2025-01-18 RX ADMIN — Medication 50 MILLIGRAM(S): at 05:35

## 2025-01-18 RX ADMIN — SODIUM CHLORIDE 50 MILLILITER(S): 9 INJECTION, SOLUTION INTRAVENOUS at 11:35

## 2025-01-18 RX ADMIN — Medication 5 MILLIGRAM(S): at 11:35

## 2025-01-18 RX ADMIN — ASPIRIN 81 MILLIGRAM(S): 81 TABLET, COATED ORAL at 12:56

## 2025-01-18 NOTE — PROGRESS NOTE ADULT - ATTENDING COMMENTS
77 yo man with history of CAD s/p CABG, Diabetes Mellitus II, Hypertension, Hyperlipidemia, and  CHF, admitted with diarrhea, found to have noravirus.    No acute evnts overnight. No nausea, still with diarrhea, advanced diet.    T(C): 37.5 (01-18-25 @ 05:20), Max: 37.5 (01-18-25 @ 05:20)  T(F): 99.5 (01-18-25 @ 05:20), Max: 99.5 (01-18-25 @ 05:20)  HR: 73 (01-18-25 @ 05:20) (73 - 97)  BP: 147/57 (01-18-25 @ 05:20) (147/57 - 171/61)  ABP: --  ABP(mean): --  RR: 16 (01-18-25 @ 05:20) (16 - 18)  SpO2: 93% (01-18-25 @ 05:20) (93% - 99%)    on exam aaox3, NAD, both lungs clear, s1, s2, regular, abdomen- soft, no pedal edema                          15.0   6.78  )-----------( 141      ( 18 Jan 2025 06:52 )             45.1   01-18    138  |  105  |  26[H]  ----------------------------<  172[H]  4.1   |  22  |  1.57[H]    Ca    9.7      18 Jan 2025 06:52  Phos  2.1     01-18    TPro  7.0  /  Alb  3.1[L]  /  TBili  0.8  /  DBili  x   /  AST  25  /  ALT  39  /  AlkPhos  74  01-18    a/p:  # Gastroenteritis secondary to noravirus  # Hypophosphotemia- replace as needed  # underlying h/o CAD s/p CABG, Diabetes Mellitus II, Hypertension, Hyperlipidemia, and  CHF  - iv fluids, monitor electrolytes, replace as needed. continue home meds for chronic conditions. can hold zetia for few days till symptoms improve.   - rest as per residnet note  disch dispo- medically active likely 24 hrs.

## 2025-01-18 NOTE — DISCHARGE NOTE PROVIDER - NSDCCAREPROVSEEN_GEN_ALL_CORE_FT
Masood, Sherley Solano, Josué Aguilera, Tona Fletcher, Hari Moe, Marga Wilks, Ann-Marie HARDIN Masood, Sherley Solano, Josué Aguilera, Tona Fletcher, Hari Moe, Marga Wilks, Ann-Marie Villalta, Carlin Maciel, Diana Katz, Silvia

## 2025-01-18 NOTE — DISCHARGE NOTE PROVIDER - ATTENDING DISCHARGE PHYSICAL EXAMINATION:
on exam aaox3, NAD, both lungs clear, s1, s2, regular, abdomen- soft, no pedal edema   no abrasions, no jaundice, no lesions, no pruritis, and no rashes.

## 2025-01-18 NOTE — CONSULT NOTE ADULT - PROBLEM SELECTOR RECOMMENDATION 9
Recommendations:-   -Hydration  -Monitor stools  -Replace electrolytes   -Advance diet as tolerated -Hydration  -Monitor stools  -Replace electrolytes as needed  -Advance diet as tolerated

## 2025-01-18 NOTE — PROGRESS NOTE ADULT - ASSESSMENT
Patient is a 80 year old male with past medical history of stomach cancer 2007 treated with surgery and chemo, bladder cancer about 5 yr back treated with surgery and chemo , CAD s/p CABG, Diabetes Mellitus 2, Hypertension, Hyperlipidemia, and history CHF presenting with nausea, diarrhea, and abdominal pain x 24 hours. Admitted for further management of diarrhea.    #Diarrhea secondary to norovirus  #Abdominal pain  -admit to medicine  -In the ED, VSS, labs unremarkable  -UA neg  -GI PCR positive for Norovirus  -contact precautions placed  -Liquid diet, advance as tolerated  -Surgery consulted in the ED, recs appreciated: no surgical intervention at this time  -GI consulted in the ED, recs appreciated: monitor stools, replace electrolytes prn, advance diet as tolerated  -c/w zofran prn  -d/c ezetimibe in the setting of diarrhea  -f/u labs  -gentle fluids prn  -encourage po intake and hydration    #DOUGLAS on CKD  -baseline Cr ~1.6  -Cr this AM 1.57  -c/w gentle fluids  -monitor BMP    #CAD s/p CABG  #HLD  #History of CHF, not in exacerbation currently  -c/w ASA 81mg  -c/w atorvastatin 80mg   -c/w metoprolol 50mg  -d/c zetia in the setting of diarrhea    #History of emphysema  -c/w albuterol prn    #Chronic neuropathy  -c/w gabapentin 600mg     #HTN  -c/w lisinopril 40mg    #Type II Diabetes  -HbA1c 3/23: 6.8%  -hold home meds  -c/w ISS, accuchecks  -maintain blood glucose 100-180 while hospitalized  -f/u A1c    #BPH  -c/w finasteride 5mg    #GERD  #GI ppx  -c/w protonix IV 40mg  -can consider switch to po when patient tolerates po intake    #Diet  -full liquid advanced to regular diet today    #GOC  -full code     Dispo: likely discharge in 24 hours    *Case seen and discussed with Dr. Correia.              Patient is a 80 year old male with past medical history of stomach cancer 2007 treated with surgery and chemo, bladder cancer about 5 yr back treated with surgery and chemo , CAD s/p CABG, Diabetes Mellitus 2, Hypertension, Hyperlipidemia, and history CHF presenting with nausea, diarrhea, and abdominal pain x 24 hours. Admitted for further management of diarrhea.    #Diarrhea secondary to norovirus  #Abdominal pain  -admit to medicine  -In the ED, VSS, labs unremarkable  -UA neg  -GI PCR positive for Norovirus  -contact precautions placed  -Liquid diet, advance as tolerated  -Surgery consulted in the ED, recs appreciated: no surgical intervention at this time  -GI consulted in the ED, recs appreciated: monitor stools, replace electrolytes prn, advance diet as tolerated  -c/w zofran prn  -d/c ezetimibe in the setting of diarrhea  -f/u labs  -gentle fluids prn  -encourage po intake and hydration    #DOUGLAS on CKD, stable  -baseline Cr ~1.6  -Cr this AM 1.57  -c/w gentle fluids  -monitor BMP    #Hypophosphatemia  -Phos 2.1   -repleted  -f/u AM labs  -replete prn    #CAD s/p CABG  #HLD  #History of CHF, not in exacerbation currently  -c/w ASA 81mg  -c/w atorvastatin 80mg   -c/w metoprolol 50mg  -d/c zetia in the setting of diarrhea    #History of emphysema  -c/w albuterol prn    #Chronic neuropathy  -c/w gabapentin 600mg     #HTN  -c/w lisinopril 40mg    #Type II Diabetes  -HbA1c 3/23: 6.8%  -hold home meds  -c/w ISS, accuchecks  -maintain blood glucose 100-180 while hospitalized  -f/u A1c    #BPH  -c/w finasteride 5mg    #GERD  #GI ppx  -c/w protonix IV 40mg  -can consider switch to po when patient tolerates po intake    #Diet  -full liquid advanced to regular diet today    #GOC  -full code     Dispo: likely discharge in 24 hours    *Case seen and discussed with Dr. Correia.              Patient is a 80 year old male with past medical history of stomach cancer 2007 treated with surgery and chemo, bladder cancer about 5 yr back treated with surgery and chemo , CAD s/p CABG, Diabetes Mellitus 2, Hypertension, Hyperlipidemia, and history CHF presenting with nausea, diarrhea, and abdominal pain x 24 hours. Admitted for further management of diarrhea.    #Diarrhea secondary to norovirus  #Abdominal pain  -admit to medicine  -In the ED, VSS, labs unremarkable  -UA neg  -GI PCR positive for Norovirus  -contact precautions placed  -Liquid diet, advance as tolerated  -Surgery consulted in the ED, recs appreciated: no surgical intervention at this time  -GI consulted in the ED, recs appreciated: monitor stools, replace electrolytes prn, advance diet as tolerated  -c/w zofran prn  -d/c ezetimibe in the setting of diarrhea  -f/u labs  -gentle fluids prn  -encourage po intake and hydration    #DOUGLAS on CKD, stable  -baseline Cr ~1.6  -Cr this AM 1.57  -c/w gentle fluids  -monitor BMP    #Hypophosphatemia  -Phos 2.1   -repleted  -f/u AM labs  -replete prn    #CAD s/p CABG  #HLD  #History of CHF, not in exacerbation currently  -c/w ASA 81mg  -c/w atorvastatin 80mg   -c/w metoprolol 50mg  -d/c zetia in the setting of diarrhea    #History of emphysema  -c/w albuterol prn    #Chronic neuropathy  -c/w gabapentin 600mg     #HTN  -c/w lisinopril 40mg    #Type II Diabetes  -HbA1c 3/23: 6.8%  -hold home meds  -c/w ISS, accuchecks  -maintain blood glucose 100-180 while hospitalized  -f/u A1c    #BPH  -c/w finasteride 5mg    #GERD  #GI ppx  -c/w protonix IV 40mg  -can consider switch to po when patient tolerates po intake    #Diet  -full liquid advanced to regular diet today  -CC, DASH/TLC    #DVT ppx  -SCDs  -encourage ambulation    #GOC  -full code     Dispo: likely discharge in 24 hours    *Case seen and discussed with Dr. Correia.

## 2025-01-18 NOTE — DISCHARGE NOTE PROVIDER - HOSPITAL COURSE
HPI:  77 yo man with history of CAD s/p CABG, Diabetes Mellitus II, Hypertension, Hyperlipidemia, and history CHF comes to ED nausea and diarrhea x 24 hrs, no blood in the stool. with generalized abdominal discomfort, no fever, no sob, no cp, no dysuria.   ed course-  Normal white count.  Bands 10%? creat 1.6 appears baseline   CT demonstrates fluid-filled stomach and small bowel loops without signs of obstruction. received Pepcid Zofran, reglan and p.o. challenge however patient states still feels as if his food is not going down.    Did have diarrheal episode in the emergency department for which a stool sample was sent.  (17 Jan 2025 17:13)      Hospital Course Summary: Patient is a 77 y/o M with past medical history of CABG, Diabetes Mellitus II, Hypertension, Hyperlipidemia, and history CHF who presented with nausea, diarrhea, and abdominal pain. In the ED, VSS. CTAP with fluid-filled stomach and small bowel loops without signs of obstruction. received Pepcid Zofran, reglan and p.o. challenge however patient states still feels as if his food is not going down. Stool studies positive for norovirus. Patient was seen by the surgery team who did not recommended no surgical intervention at this time. Gastroenterology team evaluated the patient and recommended supportive care, monitor stools, replace electrolytes as needed, and advance diet as tolerated. Patient was able to tolerate advanced diet. Patient is now medically optimized and hemodynamically stable for discharge to home with close outpatient follow-up with PCP.     ---  VITALS:   Vital Signs Last 24 Hrs  T(F): 98.1 (18 Jan 2025 12:06), Max: 99.5 (18 Jan 2025 05:20)  HR: 62 (18 Jan 2025 12:06) (62 - 79)  BP: 149/69 (18 Jan 2025 12:06) (147/57 - 171/61)  RR: 18 (18 Jan 2025 12:06) (16 - 18)  SpO2: 93% (18 Jan 2025 12:06) (93% - 96%)  ---  PHYSICAL EXAM:   General: Awake and alert, cooperative with exam. No acute distress.   Cardiology: Normal S1, S2. No murmurs. Regular rate and rhythm.   Respiratory: Lungs clear to ascultation bilaterally. No wheezes, rales, or rhonchi.   Gastrointestinal: Positive bowel sounds. Soft. Non-tender. Non-distended. No guarding, rigidity, or rebound tenderness.  Extremities: No peripheral edema bilaterally.  Neurological: A+Ox3. CN 2-12 intact. No focal neurological deficits. Normal speech. No facial droop.  ---    Indicate ongoing risks or concerns: None    30 Day Supply through Meds to Beds: N/A    GOC:   • Code Status: full code  • Summary of Goals of Care Conversation/ what matters most: feeling better and going home    Source of Infection: Norovirus  Antibiotic / Last Day: N/A    Discharging Provider:  Dr. Sherley Correia  Contact Info: [phone]    [facility]  S[facility] Provider: Sign-out given?    PLEASE COPY AND PASTE INTO CARE PLAN USING CTRL V            HPI:  77 yo man with history of CAD s/p CABG, Diabetes Mellitus II, Hypertension, Hyperlipidemia, and history CHF comes to ED nausea and diarrhea x 24 hrs, no blood in the stool. with generalized abdominal discomfort, no fever, no sob, no cp, no dysuria.   ed course-  Normal white count.  Bands 10%? creat 1.6 appears baseline   CT demonstrates fluid-filled stomach and small bowel loops without signs of obstruction. received Pepcid Zofran, reglan and p.o. challenge however patient states still feels as if his food is not going down.    Did have diarrheal episode in the emergency department for which a stool sample was sent.  (17 Jan 2025 17:13)      Hospital Course Summary: Patient is a 79 y/o M with past medical history of CABG, Diabetes Mellitus II, Hypertension, Hyperlipidemia, and history CHF who presented with nausea, diarrhea, and abdominal pain. In the ED, VSS. CTAP with fluid-filled stomach and small bowel loops without signs of obstruction. received Pepcid Zofran, reglan and p.o. challenge. Stool studies positive for norovirus. Patient was admitted for diarrhea, abdominal pain secondary to norovirus. Patient was seen by the surgery team who did not recommended no surgical intervention at this time. Gastroenterology team evaluated the patient and recommended supportive care, monitor stools, replace electrolytes as needed, and advance diet as tolerated. Patient was able to tolerate advanced diet. Patient is now medically optimized and hemodynamically stable for discharge to home with close outpatient follow-up with PCP.     ---  VITALS:   Vital Signs Last 24 Hrs  T(F): 98.1 (18 Jan 2025 12:06), Max: 99.5 (18 Jan 2025 05:20)  HR: 62 (18 Jan 2025 12:06) (62 - 79)  BP: 149/69 (18 Jan 2025 12:06) (147/57 - 171/61)  RR: 18 (18 Jan 2025 12:06) (16 - 18)  SpO2: 93% (18 Jan 2025 12:06) (93% - 96%)  ---  PHYSICAL EXAM:   General: Awake and alert, cooperative with exam. No acute distress.   Cardiology: Normal S1, S2. No murmurs. Regular rate and rhythm.   Respiratory: Lungs clear to ascultation bilaterally. No wheezes, rales, or rhonchi.   Gastrointestinal: Positive bowel sounds. Soft. Non-tender. Non-distended. No guarding, rigidity, or rebound tenderness.  Extremities: No peripheral edema bilaterally.  Neurological: A+Ox3. CN 2-12 intact. No focal neurological deficits. Normal speech. No facial droop.  ---    Indicate ongoing risks or concerns: None    30 Day Supply through Meds to Beds: N/A    GOC:   • Code Status: full code  • Summary of Goals of Care Conversation/ what matters most: feeling better and going home    Source of Infection: Norovirus  Antibiotic / Last Day: N/A    Discharging Provider:  Dr. Sherley Correia  Contact Info: [phone]    [facility] Provider[facility]  SNF Provider: Sign-out given?    PLEASE COPY AND PASTE INTO CARE PLAN USING CTRL V            HPI:  79 yo man with history of CAD s/p CABG, Diabetes Mellitus II, Hypertension, Hyperlipidemia, and history CHF comes to ED nausea and diarrhea x 24 hrs, no blood in the stool. with generalized abdominal discomfort, no fever, no sob, no cp, no dysuria.   ed course-  Normal white count.  Bands 10%? creat 1.6 appears baseline   CT demonstrates fluid-filled stomach and small bowel loops without signs of obstruction. received Pepcid Zofran, reglan and p.o. challenge however patient states still feels as if his food is not going down.    Did have diarrheal episode in the emergency department for which a stool sample was sent.  (17 Jan 2025 17:13)      Hospital Course Summary: Patient is a 79 y/o M with past medical history of CABG, Diabetes Mellitus II, Hypertension, Hyperlipidemia, and history CHF who presented with nausea, diarrhea, and abdominal pain. In the ED, VSS. CTAP with fluid-filled stomach and small bowel loops without signs of obstruction. received Pepcid Zofran, reglan and p.o. challenge. Stool studies positive for norovirus. Patient was admitted for diarrhea, abdominal pain secondary to norovirus. Patient was seen by the surgery team who did not recommended no surgical intervention at this time. Gastroenterology team evaluated the patient and recommended supportive care, monitor stools, replace electrolytes as needed, and advance diet as tolerated. Patient was able to tolerate advanced diet. Patient is now medically optimized and hemodynamically stable for discharge to home with close outpatient follow-up with PCP.     ---  VITALS:   Vital Signs Last 24 Hrs  T(C): 36.6 (19 Jan 2025 05:48), Max: 36.7 (18 Jan 2025 12:06)  T(F): 97.9 (19 Jan 2025 05:48), Max: 98.1 (18 Jan 2025 12:06)  HR: 63 (19 Jan 2025 05:48) (57 - 63)  BP: 183/85 (19 Jan 2025 05:48) (146/60 - 183/85)  BP(mean): 118 (19 Jan 2025 05:48) (118 - 118)  RR: 17 (19 Jan 2025 05:48) (16 - 18)  SpO2: 97% (19 Jan 2025 05:48) (93% - 97%)    Parameters below as of 19 Jan 2025 05:48  Patient On (Oxygen Delivery Method): room air      ---  PHYSICAL EXAM:   GENERAL: NAD, lying in bed comfortably  HEENT:  AT/NC, moist mucous membranes, EOMI, conjunctiva and sclera clear  RESPIR:  CTA b/l, no rales, wheezes, or rhonchi,  normal respiratory effort, no intercostal retractions  HEART:  RRR, S1, S2, no murmurs; no pitting edema  ABDOMEN:  +mild generalized abdominal tenderness, BS+, soft, nondistended; No HSM  MSK/EXTREMITIES: 2+ peripheral pulses, no clubbing or cyanosis  NERVOUS SYSTEM: answers questions and follows commands appropriately, A&Ox3 grossly moves all extremities   SKIN: warm, well perfused, approprate for age, no visible lesions   PSYCH: Appropriate affect, Alert & Awake; Good judgement    ---    Indicate ongoing risks or concerns: None    30 Day Supply through Meds to Beds: N/A    GOC:   • Code Status: full code  • Summary of Goals of Care Conversation/ what matters most: feeling better and going home    Source of Infection: Norovirus  Antibiotic / Last Day: N/A    Discharging Provider:  Dr. Sherley Correia  Contact Info: [phone]    [facility] Provider[facility]              HPI:  77 yo man with history of CAD s/p CABG, Diabetes Mellitus II, Hypertension, Hyperlipidemia, and history CHF comes to ED nausea and diarrhea x 24 hrs, no blood in the stool. with generalized abdominal discomfort, no fever, no sob, no cp, no dysuria.   ed course-  Normal white count.  Bands 10%? creat 1.6 appears baseline   CT demonstrates fluid-filled stomach and small bowel loops without signs of obstruction. received Pepcid Zofran, reglan and p.o. challenge however patient states still feels as if his food is not going down.    Did have diarrheal episode in the emergency department for which a stool sample was sent.  (17 Jan 2025 17:13)      Hospital Course Summary: Patient is a 79 y/o M with past medical history of CABG, Diabetes Mellitus II, Hypertension, Hyperlipidemia, and history CHF who presented with nausea, diarrhea, and abdominal pain. In the ED, VSS. CTAP with fluid-filled stomach and small bowel loops without signs of obstruction. received Pepcid Zofran, reglan and p.o. challenge. Stool studies positive for norovirus. Patient was admitted for diarrhea, abdominal pain secondary to norovirus. Patient was seen by the surgery team who did not recommended no surgical intervention at this time. Gastroenterology team evaluated the patient and recommended supportive care, monitor stools, replace electrolytes as needed, and advance diet as tolerated. Patient was able to tolerate advanced diet. Patient is now medically optimized and hemodynamically stable for discharge to home with close outpatient follow-up with PCP.     ---  VITALS:   Vital Signs Last 24 Hrs  T(C): 36.6 (19 Jan 2025 05:48), Max: 36.7 (18 Jan 2025 12:06)  T(F): 97.9 (19 Jan 2025 05:48), Max: 98.1 (18 Jan 2025 12:06)  HR: 63 (19 Jan 2025 05:48) (57 - 63)  BP: 183/85 (19 Jan 2025 05:48) (146/60 - 183/85)  BP(mean): 118 (19 Jan 2025 05:48) (118 - 118)  RR: 17 (19 Jan 2025 05:48) (16 - 18)  SpO2: 97% (19 Jan 2025 05:48) (93% - 97%)    Parameters below as of 19 Jan 2025 05:48  Patient On (Oxygen Delivery Method): room air      ---  PHYSICAL EXAM:   GENERAL: NAD, lying in bed comfortably  HEENT:  AT/NC, moist mucous membranes, EOMI, conjunctiva and sclera clear  RESPIR:  CTA b/l, no rales, wheezes, or rhonchi,  normal respiratory effort, no intercostal retractions  HEART:  RRR, S1, S2, no murmurs; no pitting edema  ABDOMEN:  +mild generalized abdominal tenderness, BS+, soft, nondistended; No HSM  MSK/EXTREMITIES: 2+ peripheral pulses, no clubbing or cyanosis  NERVOUS SYSTEM: answers questions and follows commands appropriately, A&Ox3 grossly moves all extremities   SKIN: warm, well perfused, approprate for age, no visible lesions   PSYCH: Appropriate affect, Alert & Awake; Good judgement    ---    Indicate ongoing risks or concerns: None    30 Day Supply through Meds to Beds: N/A    GOC:   • Code Status: full code  • Summary of Goals of Care Conversation/ what matters most: feeling better and going home    Source of Infection: Norovirus  Antibiotic / Last Day: N/A    Discharging Provider:  Dr. Sherley Correia  Contact Info: [phone]    [facility] Provider[facility]      # Gastroenteritis secondary to noravirus  # Hypophosphotemia- replace as needed  # underlying h/o CAD s/p CABG, Diabetes Mellitus II, Hypertension, Hyperlipidemia, and  CHF

## 2025-01-18 NOTE — CHART NOTE - NSCHARTNOTEFT_GEN_A_CORE
Discussed with patient that we can update family regarding hospital course. Patient prefers to update family on his own.

## 2025-01-18 NOTE — DISCHARGE NOTE PROVIDER - CARE PROVIDER_API CALL
PCP, PCP  [facility]  Phone: (   )    -  Fax: (   )    -  Follow Up Time:

## 2025-01-18 NOTE — CONSULT NOTE ADULT - SUBJECTIVE AND OBJECTIVE BOX
INTERVAL HPI/OVERNIGHT EVENTS:  HPI: 77 yo man with history of CAD s/p CABG, Diabetes Mellitus II, Hypertension, Hyperlipidemia, and history CHF comes to ED nausea and diarrhea x 24 hrs, no blood in the stool. with generalized abdominal discomfort, no fever, no sob, no cp, no dysuria. ed course-  Normal white count.  Bands 10%? creat 1.6 appears baseline. CT demonstrates fluid-filled stomach and small bowel loops without signs of obstruction. Received Pepcid Zofran, reglan and p.o. challenge however patient states still feels as if his food is not going down. Did have diarrheal episode in the emergency department for which a stool sample was sent.  (2025 17:13)    GI CONSULT NOTE: Pt seen and examined at the bedside. Pt is alert and oriented x4. Pt states that the symptoms started on Thursday night. S/S- body aches, diarrhea (x5), urinating every five minutes, body feeling sore, emesis (x3). Pt came into [facility] yesterday. He is a  and follows up with [facility]. He is schedule for an endoscopy next week with his GI doc at the [facility]. Pt now suggesting to do colonoscopy and endoscopy together. As of today, pt states that he feels better overall. Denies any n/v/d/fever/chills. No significant events overnight.     MEDICATIONS  (STANDING):  aspirin enteric coated 81 milliGRAM(s) Oral daily  atorvastatin 80 milliGRAM(s) Oral at bedtime  ezetimibe 10 milliGRAM(s) Oral daily  finasteride 5 milliGRAM(s) Oral daily  gabapentin 600 milliGRAM(s) Oral daily  lisinopril 40 milliGRAM(s) Oral daily  metoprolol succinate ER 50 milliGRAM(s) Oral daily  pantoprazole  Injectable 40 milliGRAM(s) IV Push daily  sodium chloride 0.45%. 1000 milliLiter(s) (50 mL/Hr) IV Continuous <Continuous>    MEDICATIONS  (PRN):  acetaminophen     Tablet .. 650 milliGRAM(s) Oral every 6 hours PRN Temp greater or equal to 38C (100.4F), Mild Pain (1 - 3)  albuterol    90 MICROgram(s) HFA Inhaler 2 Puff(s) Inhalation every 6 hours PRN Bronchospasm  aluminum hydroxide/magnesium hydroxide/simethicone Suspension 30 milliLiter(s) Oral every 4 hours PRN Dyspepsia  melatonin 3 milliGRAM(s) Oral at bedtime PRN Insomnia  metoclopramide Injectable 5 milliGRAM(s) IV Push every 6 hours PRN nausea  ondansetron Injectable 4 milliGRAM(s) IV Push every 8 hours PRN Nausea and/or Vomiting      Allergies  No Known Allergies    Intolerances      PAST MEDICAL & SURGICAL HISTORY:  HTN (hypertension)  HLD (hyperlipidemia)  Diabetes mellitus, type 2  CAD (coronary artery disease)  Chronic CHF  CKD (chronic kidney disease)  S/P CABG x 1  History of appendectomy  History of cholecystectomy  H/O abdominal surgery  for 'stomach cancer'      REVIEW OF SYSTEMS  Negative unless indicated in the HPI.     PHYSICAL EXAM:   Vital Signs:  Vital Signs Last 24 Hrs  T(C): 37.5 (2025 05:20), Max: 37.5 (2025 05:20)  T(F): 99.5 (2025 05:20), Max: 99.5 (2025 05:20)  HR: 73 (2025 05:20) (73 - 97)  BP: 147/57 (2025 05:20) (147/57 - 171/61)  BP(mean): --  RR: 16 (2025 05:20) (16 - 18)  SpO2: 93% (2025 05:20) (93% - 99%)    Parameters below as of 2025 05:20  Patient On (Oxygen Delivery Method): room air      Daily Height in cm: 182.88 (2025 12:40)    Daily Weight in k (2025 05:20)I&O's Summary    2025 07:01  -  2025 07:00  --------------------------------------------------------  IN: 0 mL / OUT: 600 mL / NET: -600 mL        GENERAL:  Appears stated age, well-groomed, well-nourished, no distress  HEENT:  NC/AT,  conjunctivae clear and pink,   CHEST:  Full & symmetric excursion,  HEART:  Regular rhythm,   ABDOMEN:  Soft, non-tender, non-distended, normoactive bowel sounds  EXTEREMITIES:  no cyanosis,   SKIN:  No rash/warm/dry  NEURO:  Alert, oriented,       LABS:                        15.0   6.78  )-----------( 141      ( 2025 06:52 )             45.1         138  |  105  |  26[H]  ----------------------------<  172[H]  4.1   |  22  |  1.57[H]    Ca    9.7      2025 06:52  Phos  2.1         TPro  7.0  /  Alb  3.1[L]  /  TBili  0.8  /  DBili  x   /  AST  25  /  ALT  39  /  AlkPhos  74        Urinalysis Basic - ( 2025 06:52 )  Color: x / Appearance: x / SG: x / pH: x  Gluc: 172 mg/dL / Ketone: x  / Bili: x / Urobili: x   Blood: x / Protein: x / Nitrite: x   Leuk Esterase: x / RBC: x / WBC x   Sq Epi: x / Non Sq Epi: x / Bacteria: x      amylase   lipase Lipase: 22 U/L ( @ 13:00)    RADIOLOGY & ADDITIONAL TESTS:  ACC: [id number] EXAM:  CT ABDOMEN AND PELVIS   ORDERED BY: ANTHONY FERNANDES   PROCEDURE DATE:  2025    INTERPRETATION:  CLINICAL INFORMATION: Abdominal pain and tenderness;   rule out colitis.    COMPARISON: Relevant images from CT chest March 10, 2023    CONTRAST/COMPLICATIONS:  IV Contrast: NONE  Oral Contrast: NONE    PROCEDURE:  CT of the Abdomen and Pelvis was performed.  Sagittal and coronal reformats were performed.    FINDINGS:  LOWER CHEST: 7 mm right lower lobe nodule, partially imaged, however   appears unchanged compared with March 10, 2023.    LIVER: Within normal limits.  BILE DUCTS: Normal caliber.  GALLBLADDER: Cholecystectomy.  SPLEEN: Within normal limits.  PANCREAS: Within normal limits.  ADRENALS: Stable bilateral adrenal thickening.  KIDNEYS/URETERS: Within normal limits.    BLADDER: Within normal limits.  REPRODUCTIVE ORGANS: Prostate is enlarged.    BOWEL: The stomach is mildly distended with fluid and there are   fluid-filled small bowel loops. No bowel obstruction or wall thickening.   Appendix is not visualized.  PERITONEUM/RETROPERITONEUM: Within normal limits.  VESSELS: Atherosclerotic changes.  LYMPH NODES: No lymphadenopathy.  ABDOMINAL WALL: Within normal limits.  BONES: Mild bilateral hip degenerative changes.    IMPRESSION:  Fluid-filled stomach and small bowel loops without evidence of   obstruction or gastroenteritis.    --- End of Report ---  ONEYDA NOEL MD; Attending Radiologist  This document has been electronically signed. 2025  2:31PM INTERVAL HPI/OVERNIGHT EVENTS:  HPI: 79 yo man with history of CAD s/p CABG, Diabetes Mellitus II, Hypertension, Hyperlipidemia, and history CHF comes to ED nausea and diarrhea x 24 hrs, no blood in the stool. with generalized abdominal discomfort, no fever, no sob, no cp, no dysuria. ed course-  Normal white count.  Bands 10%? creat 1.6 appears baseline. CT demonstrates fluid-filled stomach and small bowel loops without signs of obstruction. Received Pepcid Zofran, reglan and p.o. challenge however patient states still feels as if his food is not going down. Did have diarrheal episode in the emergency department for which a stool sample was sent.  (2025 17:13)    GI CONSULT NOTE: Pt seen and examined at the bedside. Pt is alert and oriented x4. Pt states that the symptoms started on Thursday night. S/S- body aches, diarrhea (x5), urinating every five minutes, body feeling sore, emesis (x3). Pt came into [facility] yesterday. He is a  and follows up with [facility]. He is schedule for an endoscopy next week with his GI doc at the [facility]. [facility] As of today, pt states that he feels better overall. Denies any n/v/d/fever/chills. No significant events overnight.     MEDICATIONS  (STANDING):  aspirin enteric coated 81 milliGRAM(s) Oral daily  atorvastatin 80 milliGRAM(s) Oral at bedtime  ezetimibe 10 milliGRAM(s) Oral daily  finasteride 5 milliGRAM(s) Oral daily  gabapentin 600 milliGRAM(s) Oral daily  lisinopril 40 milliGRAM(s) Oral daily  metoprolol succinate ER 50 milliGRAM(s) Oral daily  pantoprazole  Injectable 40 milliGRAM(s) IV Push daily  sodium chloride 0.45%. 1000 milliLiter(s) (50 mL/Hr) IV Continuous <Continuous>    MEDICATIONS  (PRN):  acetaminophen     Tablet .. 650 milliGRAM(s) Oral every 6 hours PRN Temp greater or equal to 38C (100.4F), Mild Pain (1 - 3)  albuterol    90 MICROgram(s) HFA Inhaler 2 Puff(s) Inhalation every 6 hours PRN Bronchospasm  aluminum hydroxide/magnesium hydroxide/simethicone Suspension 30 milliLiter(s) Oral every 4 hours PRN Dyspepsia  melatonin 3 milliGRAM(s) Oral at bedtime PRN Insomnia  metoclopramide Injectable 5 milliGRAM(s) IV Push every 6 hours PRN nausea  ondansetron Injectable 4 milliGRAM(s) IV Push every 8 hours PRN Nausea and/or Vomiting      Allergies  No Known Allergies    Intolerances      PAST MEDICAL & SURGICAL HISTORY:  HTN (hypertension)  HLD (hyperlipidemia)  Diabetes mellitus, type 2  CAD (coronary artery disease)  Chronic CHF  CKD (chronic kidney disease)  S/P CABG x 1  History of appendectomy  History of cholecystectomy  H/O abdominal surgery  for 'stomach cancer'      REVIEW OF SYSTEMS  Negative unless indicated in the HPI.     PHYSICAL EXAM:   Vital Signs:  Vital Signs Last 24 Hrs  T(C): 37.5 (2025 05:20), Max: 37.5 (2025 05:20)  T(F): 99.5 (2025 05:20), Max: 99.5 (2025 05:20)  HR: 73 (2025 05:20) (73 - 97)  BP: 147/57 (2025 05:20) (147/57 - 171/61)  BP(mean): --  RR: 16 (2025 05:20) (16 - 18)  SpO2: 93% (2025 05:20) (93% - 99%)    Parameters below as of 2025 05:20  Patient On (Oxygen Delivery Method): room air      Daily Height in cm: 182.88 (2025 12:40)    Daily Weight in k (2025 05:20)I&O's Summary    2025 07:01  -  2025 07:00  --------------------------------------------------------  IN: 0 mL / OUT: 600 mL / NET: -600 mL        GENERAL:  Appears stated age, well-groomed, well-nourished, no distress  HEENT:  NC/AT,  conjunctivae clear and pink,   CHEST:  Full & symmetric excursion,  HEART:  Regular rhythm,   ABDOMEN:  Soft, non-tender, non-distended, normoactive bowel sounds  EXTEREMITIES:  no cyanosis,   SKIN:  No rash/warm/dry  NEURO:  Alert, oriented,       LABS:                        15.0   6.78  )-----------( 141      ( 2025 06:52 )             45.1         138  |  105  |  26[H]  ----------------------------<  172[H]  4.1   |  22  |  1.57[H]    Ca    9.7      2025 06:52  Phos  2.1         TPro  7.0  /  Alb  3.1[L]  /  TBili  0.8  /  DBili  x   /  AST  25  /  ALT  39  /  AlkPhos  74        Urinalysis Basic - ( 2025 06:52 )  Color: x / Appearance: x / SG: x / pH: x  Gluc: 172 mg/dL / Ketone: x  / Bili: x / Urobili: x   Blood: x / Protein: x / Nitrite: x   Leuk Esterase: x / RBC: x / WBC x   Sq Epi: x / Non Sq Epi: x / Bacteria: x      amylase   lipase Lipase: 22 U/L ( @ 13:00)    RADIOLOGY & ADDITIONAL TESTS:  ACC: [id number] EXAM:  CT ABDOMEN AND PELVIS   ORDERED BY: ANTHONY FERNANDES   PROCEDURE DATE:  2025    INTERPRETATION:  CLINICAL INFORMATION: Abdominal pain and tenderness;   rule out colitis.    COMPARISON: Relevant images from CT chest March 10, 2023    CONTRAST/COMPLICATIONS:  IV Contrast: NONE  Oral Contrast: NONE    PROCEDURE:  CT of the Abdomen and Pelvis was performed.  Sagittal and coronal reformats were performed.    FINDINGS:  LOWER CHEST: 7 mm right lower lobe nodule, partially imaged, however   appears unchanged compared with March 10, 2023.    LIVER: Within normal limits.  BILE DUCTS: Normal caliber.  GALLBLADDER: Cholecystectomy.  SPLEEN: Within normal limits.  PANCREAS: Within normal limits.  ADRENALS: Stable bilateral adrenal thickening.  KIDNEYS/URETERS: Within normal limits.    BLADDER: Within normal limits.  REPRODUCTIVE ORGANS: Prostate is enlarged.    BOWEL: The stomach is mildly distended with fluid and there are   fluid-filled small bowel loops. No bowel obstruction or wall thickening.   Appendix is not visualized.  PERITONEUM/RETROPERITONEUM: Within normal limits.  VESSELS: Atherosclerotic changes.  LYMPH NODES: No lymphadenopathy.  ABDOMINAL WALL: Within normal limits.  BONES: Mild bilateral hip degenerative changes.    IMPRESSION:  Fluid-filled stomach and small bowel loops without evidence of   obstruction or gastroenteritis.    --- End of Report ---  ONEYDA NOEL MD; Attending Radiologist  This document has been electronically signed. 2025  2:31PM

## 2025-01-18 NOTE — CONSULT NOTE ADULT - PROBLEM SELECTOR RECOMMENDATION 2
Recommendations:-  - most likely 2/2 Norovirus   - bowel rest  - monitor/replace electrolytes   - IV fluids

## 2025-01-18 NOTE — CONSULT NOTE ADULT - NS ATTEND AMEND GEN_ALL_CORE FT
Agree with assessment and plan as above. I attest my time as attending was greater than 50% of the total time of 75 min on patient care (ACP <= 25 mins) on this DOS. Time spent includes review of hospital course, labs, vitals, medical records, patient evaluation, contact with family (when present), discussion of plan with the primary team, coordinating services and documenting encounter.     79 y/o M with multiple comorbid conditions who presents to the ED for nausea and diarrhea since Thursday. Patient states that he has not noticed any blood in his stool. Also has generalized abdominal discomfort. GI PCR revealing infection with Norovirus. Would treat symptomatically at this time. Ensure adequate hydration and repletion of electrolytes. [place] diet and advance as tolerated. No antibiotics needed at this time.

## 2025-01-18 NOTE — CONSULT NOTE ADULT - PROBLEM/RECOMMENDATION-1
Chief Complaint   Patient presents with     RECHECK     DYSTONIA WITH NECK PAIN       Kaylie Marte MA    
DISPLAY PLAN FREE TEXT

## 2025-01-18 NOTE — DISCHARGE NOTE PROVIDER - NSDCFUADDAPPT_GEN_ALL_CORE_FT
APPTS ARE READY TO BE MADE: [X] YES    Best Family or Patient Contact (if needed):    Additional Information about above appointments (if needed):    1: PCP   2:   3:     Other comments or requests:

## 2025-01-18 NOTE — CONSULT NOTE ADULT - ASSESSMENT
This is a very pleasant 80M w/ a PMH significant for remote stomach and bladder cancer, CAD s/p CABG, DM, HTN, HLD and CHF who is here with norovirus. He is afebrile, hemodynamically stable with benign abdomen.    Supportive care per primary team.  Hydrate and replete electrolytes to avoid ileus.   Appreciate GI recs.    No surgical intervention.    Call with questions.
79 yo man with history of CAD s/p CABG, Diabetes Mellitus II, Hypertension, Hyperlipidemia, and history CHF comes to ED nausea and diarrhea x 24 hrs, no blood in the stool. with generalized abdominal discomfort, no fever, no sob, no cp, no dysuria. ed course-  Normal white count.  Bands 10%? creat 1.6 appears baseline.     GI was consulted for - CT demonstrates fluid-filled stomach and small bowel loops without signs of obstruction. No significants events overnight.   Positive for Norovirus  GI PCR panel completed.

## 2025-01-18 NOTE — CONSULT NOTE ADULT - SUBJECTIVE AND OBJECTIVE BOX
HPI:  This is a very pleasant 80M w/ a PMH significant for remote stomach and bladder cancer, CAD s/p CABG, DM, HTN, HLD and CHF who presented with nausea, vomiting and diarrhea for 24 hours. He has never had issues with gastroparesis or PO intolerance prior. No sick contacts. No family members with similar symptoms. No blood his his stool. He has had colonoscopies in the past and is due for one and is waiting to schedule one.    PMH/PSH:  emote stomach and bladder cancer, CAD s/p CABG, DM, HTN, HLD and CHF, appendectomy, cholecystectomy  NKDA  Meds:  · 	gabapentin 600 mg oral tablet: 1 tab(s) orally once a day  · 	NovoLIN 70/30 subcutaneous suspension: Last Dose Taken:  , 60 unit(s) subcutaneous once a day (in the morning)  · 	NovoLIN 70/30 FlexPen subcutaneous suspension: Last Dose Taken:  , 25 unit(s) subcutaneous once a day (at bedtime)  · 	metoprolol succinate 100 mg oral tablet, extended release: Last Dose Taken:  , 0.5 tab(s) orally once a day  · 	aspirin 81 mg oral tablet: Last Dose Taken:  , 1 tab(s) orally once a day  · 	lisinopril 40 mg oral tablet: Last Dose Taken:  , 1 tab(s) orally once a day  · 	albuterol 90 mcg/inh inhalation aerosol: Last Dose Taken:  , 2 puff(s) inhaled every 6 hours, As Needed  · 	atorvastatin 80 mg oral tablet: Last Dose Taken:  , 1 tab(s) orally once a day  · 	ezetimibe 10 mg oral tablet: Last Dose Taken:  , 1 tab(s) orally once a day  Fam hx: noncontributory  Social: no current smoking, ETOH or drug use    ROS: A 10 point ROS was performed for which the pertinent positives and negatives were noted in the HPI. All others were reviewed and were negative.    Vital Signs Last 24 Hrs  T(C): 37.5 (18 Jan 2025 05:20), Max: 37.5 (18 Jan 2025 05:20)  T(F): 99.5 (18 Jan 2025 05:20), Max: 99.5 (18 Jan 2025 05:20)  HR: 73 (18 Jan 2025 05:20) (73 - 97)  BP: 147/57 (18 Jan 2025 05:20) (147/57 - 171/61)  RR: 16 (18 Jan 2025 05:20) (16 - 18)  SpO2: 93% (18 Jan 2025 05:20) (93% - 99%)  General appearance: no acute distress  HEENT: Normocephalic, atraumatic  Respiratory: nonlabored breathing  Abdomen: soft, non-tender, non-distended, no rebound or guarding  Extremities: No edema  Psych: calm affect  Neuro: no focal deficits appreciated    Labs:  WBC 6 BUN/Cr 26/1.57 phos 2.1 GI panel + norovirus    CT abdomen/pelvis: no SBO, no acute surgical issue, fluid filled stomach/small bowel

## 2025-01-18 NOTE — DISCHARGE NOTE PROVIDER - NSDCCPCAREPLAN_GEN_ALL_CORE_FT
PRINCIPAL DISCHARGE DIAGNOSIS  Diagnosis: Norovirus  Assessment and Plan of Treatment: You came to the hospital due to: diarrhea, nausea, abdominal pain  You were diagnosed with: Norovirus  You were treated with: supportive care, fluids  You were prescribed the following new medications: none  You will need to follow up with your primary care physician for further management.  Discharging Provider: Dr. Sherley Correia  Contact Info: [phone] - Please call with any questions or concerns.  -  DISCHARGE INSTRUCTIONS:  Return to the emergency department if:   You feel confused.   Your heartbeat is faster than usual.   Your eyes look deeply sunken, or you have no tears when you cry.   You urinate less than usual, or your urine is dark yellow.   You have blood or mucus in your bowel movements.  You have severe abdominal pain.   You are unable to drink any liquids.   Contact your healthcare provider if:   Your symptoms do not get better with treatment.   You have a fever higher than 101.3°F (38.5°C).   You have trouble eating and drinking because you are vomiting.   Your diarrhea does not get better in 7 days.   You have questions or concerns about your condition or care.   -  Prevent acute diarrhea:   Wash your hands often. Use soap and water. Wash your hands before you eat or prepare food. Also wash your hands after you use the bathroom. Use an alcohol-based hand gel when soap and water are not available. Handwashing  Keep bathroom surfaces clean. This helps prevent the spread of germs that cause acute diarrhea.   Wash fruits and vegetables well before you eat them. This can help remove germs that cause diarrhea. If possible, remove the skin from fruits and vegetables, or cook them well before you eat them.   Cook meat and poultry as directed. Meat includes beef and pork. Poultry includes chicken, turkey, and duck.        SECONDARY DISCHARGE DIAGNOSES  Diagnosis: CAD (coronary artery disease)  Assessment and Plan of Treatment:     Diagnosis: DM (diabetes mellitus)  Assessment and Plan of Treatment:     Diagnosis: HTN (hypertension)  Assessment and Plan of Treatment:     Diagnosis: HLD (hyperlipidemia)  Assessment and Plan of Treatment:     Diagnosis: CHF (congestive heart failure)  Assessment and Plan of Treatment:      PRINCIPAL DISCHARGE DIAGNOSIS  Diagnosis: Norovirus  Assessment and Plan of Treatment: You came to the hospital due to: diarrhea, nausea, abdominal pain  You were diagnosed with: Norovirus  You were treated with: supportive care, fluids  You were prescribed the following new medications: none  You will need to follow up with your primary care physician for further management.  Discharging Provider: Dr. Sherley Correia  Contact Info: [phone] - Please call with any questions or concerns.  -  DISCHARGE INSTRUCTIONS:  Return to the emergency department if:   You feel confused.   Your heartbeat is faster than usual.   Your eyes look deeply sunken, or you have no tears when you cry.   You urinate less than usual, or your urine is dark yellow.   You have blood or mucus in your bowel movements.  You have severe abdominal pain.   You are unable to drink any liquids.   Contact your healthcare provider if:   Your symptoms do not get better with treatment.   You have a fever higher than 101.3°F (38.5°C).   You have trouble eating and drinking because you are vomiting.   Your diarrhea does not get better in 7 days.   You have questions or concerns about your condition or care.   -  Prevent acute diarrhea:   Wash your hands often. Use soap and water. Wash your hands before you eat or prepare food. Also wash your hands after you use the bathroom. Use an alcohol-based hand gel when soap and water are not available. Handwashing  Keep bathroom surfaces clean. This helps prevent the spread of germs that cause acute diarrhea.   Wash fruits and vegetables well before you eat them. This can help remove germs that cause diarrhea. If possible, remove the skin from fruits and vegetables, or cook them well before you eat them.   Cook meat and poultry as directed. Meat includes beef and pork. Poultry includes chicken, turkey, and duck.        SECONDARY DISCHARGE DIAGNOSES  Diagnosis: CAD (coronary artery disease)  Assessment and Plan of Treatment:     Diagnosis: DM (diabetes mellitus)  Assessment and Plan of Treatment:     Diagnosis: HTN (hypertension)  Assessment and Plan of Treatment:     Diagnosis: HLD (hyperlipidemia)  Assessment and Plan of Treatment:     Diagnosis: CHF (congestive heart failure)  Assessment and Plan of Treatment:     Diagnosis: Hypophosphatemia  Assessment and Plan of Treatment: Please follow up with your PCP for replenishment as needed

## 2025-01-18 NOTE — DISCHARGE NOTE PROVIDER - NSDCMRMEDTOKEN_GEN_ALL_CORE_FT
albuterol 90 mcg/inh inhalation aerosol: 2 puff(s) inhaled every 6 hours, As Needed  aspirin 81 mg oral tablet: 1 tab(s) orally once a day  atorvastatin 80 mg oral tablet: 1 tab(s) orally once a day  ezetimibe 10 mg oral tablet: 1 tab(s) orally once a day  finasteride 5 mg oral tablet: 1 tab(s) orally once a day  gabapentin 600 mg oral tablet: 1 tab(s) orally once a day  lisinopril 40 mg oral tablet: 1 tab(s) orally once a day  metoprolol succinate 100 mg oral tablet, extended release: 0.5 tab(s) orally once a day  NovoLIN 70/30 FlexPen subcutaneous suspension: 20 unit(s) subcutaneous once a day  NovoLIN 70/30 subcutaneous suspension: 10 unit(s) subcutaneous once (at bedtime)   albuterol 90 mcg/inh inhalation aerosol: 2 puff(s) inhaled every 6 hours, As Needed  aspirin 81 mg oral tablet: 1 tab(s) orally once a day  atorvastatin 80 mg oral tablet: 1 tab(s) orally once a day  finasteride 5 mg oral tablet: 1 tab(s) orally once a day  gabapentin 600 mg oral tablet: 1 tab(s) orally once a day  lisinopril 40 mg oral tablet: 1 tab(s) orally once a day  metoprolol succinate 100 mg oral tablet, extended release: 0.5 tab(s) orally once a day  NovoLIN 70/30 FlexPen subcutaneous suspension: 20 unit(s) subcutaneous once a day  NovoLIN 70/30 subcutaneous suspension: 10 unit(s) subcutaneous once (at bedtime)

## 2025-01-19 VITALS
HEART RATE: 58 BPM | OXYGEN SATURATION: 97 % | DIASTOLIC BLOOD PRESSURE: 72 MMHG | RESPIRATION RATE: 17 BRPM | SYSTOLIC BLOOD PRESSURE: 176 MMHG | TEMPERATURE: 98 F

## 2025-01-19 DIAGNOSIS — A08.11 ACUTE GASTROENTEROPATHY DUE TO NORWALK AGENT: ICD-10-CM

## 2025-01-19 DIAGNOSIS — R10.9 UNSPECIFIED ABDOMINAL PAIN: ICD-10-CM

## 2025-01-19 LAB
A1C WITH ESTIMATED AVERAGE GLUCOSE RESULT: 8.1 % — HIGH (ref 4–5.6)
ALBUMIN SERPL ELPH-MCNC: 2.8 G/DL — LOW (ref 3.3–5)
ALP SERPL-CCNC: 89 U/L — SIGNIFICANT CHANGE UP (ref 40–120)
ALT FLD-CCNC: 36 U/L — SIGNIFICANT CHANGE UP (ref 10–45)
ANION GAP SERPL CALC-SCNC: 8 MMOL/L — SIGNIFICANT CHANGE UP (ref 5–17)
AST SERPL-CCNC: 29 U/L — SIGNIFICANT CHANGE UP (ref 10–40)
BASOPHILS # BLD AUTO: 0.02 K/UL — SIGNIFICANT CHANGE UP (ref 0–0.2)
BASOPHILS NFR BLD AUTO: 0.4 % — SIGNIFICANT CHANGE UP (ref 0–2)
BILIRUB SERPL-MCNC: 0.5 MG/DL — SIGNIFICANT CHANGE UP (ref 0.2–1.2)
BUN SERPL-MCNC: 23 MG/DL — SIGNIFICANT CHANGE UP (ref 7–23)
CALCIUM SERPL-MCNC: 9.5 MG/DL — SIGNIFICANT CHANGE UP (ref 8.4–10.5)
CHLORIDE SERPL-SCNC: 106 MMOL/L — SIGNIFICANT CHANGE UP (ref 96–108)
CO2 SERPL-SCNC: 25 MMOL/L — SIGNIFICANT CHANGE UP (ref 22–31)
CREAT SERPL-MCNC: 1.47 MG/DL — HIGH (ref 0.5–1.3)
EGFR: 49 ML/MIN/1.73M2 — LOW
EOSINOPHIL # BLD AUTO: 0.15 K/UL — SIGNIFICANT CHANGE UP (ref 0–0.5)
EOSINOPHIL NFR BLD AUTO: 3 % — SIGNIFICANT CHANGE UP (ref 0–6)
ESTIMATED AVERAGE GLUCOSE: 186 MG/DL — HIGH (ref 68–114)
GLUCOSE BLDC GLUCOMTR-MCNC: 136 MG/DL — HIGH (ref 70–99)
GLUCOSE BLDC GLUCOMTR-MCNC: 173 MG/DL — HIGH (ref 70–99)
GLUCOSE SERPL-MCNC: 141 MG/DL — HIGH (ref 70–99)
HCT VFR BLD CALC: 43.7 % — SIGNIFICANT CHANGE UP (ref 39–50)
HGB BLD-MCNC: 15 G/DL — SIGNIFICANT CHANGE UP (ref 13–17)
IMM GRANULOCYTES NFR BLD AUTO: 0.2 % — SIGNIFICANT CHANGE UP (ref 0–0.9)
LYMPHOCYTES # BLD AUTO: 1.21 K/UL — SIGNIFICANT CHANGE UP (ref 1–3.3)
LYMPHOCYTES # BLD AUTO: 23.9 % — SIGNIFICANT CHANGE UP (ref 13–44)
MAGNESIUM SERPL-MCNC: 2.2 MG/DL — SIGNIFICANT CHANGE UP (ref 1.6–2.6)
MCHC RBC-ENTMCNC: 31.3 PG — SIGNIFICANT CHANGE UP (ref 27–34)
MCHC RBC-ENTMCNC: 34.3 G/DL — SIGNIFICANT CHANGE UP (ref 32–36)
MCV RBC AUTO: 91.2 FL — SIGNIFICANT CHANGE UP (ref 80–100)
MONOCYTES # BLD AUTO: 0.91 K/UL — HIGH (ref 0–0.9)
MONOCYTES NFR BLD AUTO: 17.9 % — HIGH (ref 2–14)
NEUTROPHILS # BLD AUTO: 2.77 K/UL — SIGNIFICANT CHANGE UP (ref 1.8–7.4)
NEUTROPHILS NFR BLD AUTO: 54.6 % — SIGNIFICANT CHANGE UP (ref 43–77)
NRBC # BLD: 0 /100 WBCS — SIGNIFICANT CHANGE UP (ref 0–0)
NRBC BLD-RTO: 0 /100 WBCS — SIGNIFICANT CHANGE UP (ref 0–0)
PHOSPHATE SERPL-MCNC: 1.6 MG/DL — LOW (ref 2.5–4.5)
PLATELET # BLD AUTO: 134 K/UL — LOW (ref 150–400)
POTASSIUM SERPL-MCNC: 4 MMOL/L — SIGNIFICANT CHANGE UP (ref 3.5–5.3)
POTASSIUM SERPL-SCNC: 4 MMOL/L — SIGNIFICANT CHANGE UP (ref 3.5–5.3)
PROT SERPL-MCNC: 6.4 G/DL — SIGNIFICANT CHANGE UP (ref 6–8.3)
RBC # BLD: 4.79 M/UL — SIGNIFICANT CHANGE UP (ref 4.2–5.8)
RBC # FLD: 13.5 % — SIGNIFICANT CHANGE UP (ref 10.3–14.5)
SODIUM SERPL-SCNC: 139 MMOL/L — SIGNIFICANT CHANGE UP (ref 135–145)
WBC # BLD: 5.07 K/UL — SIGNIFICANT CHANGE UP (ref 3.8–10.5)
WBC # FLD AUTO: 5.07 K/UL — SIGNIFICANT CHANGE UP (ref 3.8–10.5)

## 2025-01-19 PROCEDURE — 71045 X-RAY EXAM CHEST 1 VIEW: CPT

## 2025-01-19 PROCEDURE — 96374 THER/PROPH/DIAG INJ IV PUSH: CPT

## 2025-01-19 PROCEDURE — 83036 HEMOGLOBIN GLYCOSYLATED A1C: CPT

## 2025-01-19 PROCEDURE — 82962 GLUCOSE BLOOD TEST: CPT

## 2025-01-19 PROCEDURE — 81001 URINALYSIS AUTO W/SCOPE: CPT

## 2025-01-19 PROCEDURE — 83690 ASSAY OF LIPASE: CPT

## 2025-01-19 PROCEDURE — 74176 CT ABD & PELVIS W/O CONTRAST: CPT | Mod: MC

## 2025-01-19 PROCEDURE — 99239 HOSP IP/OBS DSCHRG MGMT >30: CPT | Mod: GC

## 2025-01-19 PROCEDURE — 96375 TX/PRO/DX INJ NEW DRUG ADDON: CPT

## 2025-01-19 PROCEDURE — 87040 BLOOD CULTURE FOR BACTERIA: CPT

## 2025-01-19 PROCEDURE — 87507 IADNA-DNA/RNA PROBE TQ 12-25: CPT

## 2025-01-19 PROCEDURE — 36415 COLL VENOUS BLD VENIPUNCTURE: CPT

## 2025-01-19 PROCEDURE — 83735 ASSAY OF MAGNESIUM: CPT

## 2025-01-19 PROCEDURE — 85025 COMPLETE CBC W/AUTO DIFF WBC: CPT

## 2025-01-19 PROCEDURE — 80053 COMPREHEN METABOLIC PANEL: CPT

## 2025-01-19 PROCEDURE — 96361 HYDRATE IV INFUSION ADD-ON: CPT

## 2025-01-19 PROCEDURE — 84100 ASSAY OF PHOSPHORUS: CPT

## 2025-01-19 PROCEDURE — 93005 ELECTROCARDIOGRAM TRACING: CPT

## 2025-01-19 PROCEDURE — 99285 EMERGENCY DEPT VISIT HI MDM: CPT | Mod: 25

## 2025-01-19 PROCEDURE — 99232 SBSQ HOSP IP/OBS MODERATE 35: CPT

## 2025-01-19 RX ORDER — SODIUM PHOSPHATE, DIBASIC, ANHYDROUS, POTASSIUM PHOSPHATE, MONOBASIC, AND SODIUM PHOSPHATE, MONOBASIC, MONOHYDRATE 852; 155; 130 MG/1; MG/1; MG/1
1 TABLET, COATED ORAL ONCE
Refills: 0 | Status: COMPLETED | OUTPATIENT
Start: 2025-01-19 | End: 2025-01-19

## 2025-01-19 RX ADMIN — Medication 50 MILLIGRAM(S): at 06:22

## 2025-01-19 RX ADMIN — ASPIRIN 81 MILLIGRAM(S): 81 TABLET, COATED ORAL at 13:49

## 2025-01-19 RX ADMIN — Medication 40 MILLIGRAM(S): at 06:22

## 2025-01-19 RX ADMIN — SODIUM CHLORIDE 50 MILLILITER(S): 9 INJECTION, SOLUTION INTRAVENOUS at 06:32

## 2025-01-19 RX ADMIN — GABAPENTIN 600 MILLIGRAM(S): 800 TABLET ORAL at 11:49

## 2025-01-19 RX ADMIN — SODIUM PHOSPHATE, DIBASIC, ANHYDROUS, POTASSIUM PHOSPHATE, MONOBASIC, AND SODIUM PHOSPHATE, MONOBASIC, MONOHYDRATE 1 PACKET(S): 852; 155; 130 TABLET, COATED ORAL at 11:49

## 2025-01-19 RX ADMIN — PANTOPRAZOLE 40 MILLIGRAM(S): 20 TABLET, DELAYED RELEASE ORAL at 11:49

## 2025-01-19 RX ADMIN — Medication 5 MILLIGRAM(S): at 11:49

## 2025-01-19 RX ADMIN — Medication 2: at 12:35

## 2025-01-19 NOTE — DISCHARGE NOTE NURSING/CASE MANAGEMENT/SOCIAL WORK - NSDCFUADDAPPT_GEN_ALL_CORE_FT
APPTS ARE READY TO BE MADE: [X] YES  [facility]    Best Family or Patient Contact (if needed):    Additional Information about above appointments (if needed):    1: PCP   2:   3:     Other comments or requests:

## 2025-01-19 NOTE — PROGRESS NOTE ADULT - NS ATTEND AMEND GEN_ALL_CORE FT
Agree with assessment and plan as above. I attest my time as attending was greater than 50% of the total time of 35 min on patient care (ACP <= 25 mins) on this DOS. Time spent includes review of hospital course, labs, vitals, medical records, patient evaluation, contact with family (when present), discussion of plan with the primary team, coordinating services and documenting encounter.     Patient feeling better today. Resolution of abdominal pain and diarrhea. Advised to buy pedialyte or gatorade at pharmacy if continues to have diarrhea to ensure adequate hydration and electrolyte repletion. To follow up as an outpatient with providers at the [facility].

## 2025-01-19 NOTE — PROGRESS NOTE ADULT - ASSESSMENT
Patient is a 80 year old male with past medical history of stomach cancer 2007 treated with surgery and chemo, bladder cancer about 5 yr back treated with surgery and chemo , CAD s/p CABG, Diabetes Mellitus 2, Hypertension, Hyperlipidemia, and history CHF presenting with nausea, diarrhea, and abdominal pain x 24 hours. Admitted for further management of diarrhea.    #Diarrhea secondary to norovirus  #Abdominal pain  -admit to medicine  -In the ED, VSS, labs unremarkable  -UA neg  -GI PCR positive for Norovirus  -contact precautions placed  -Liquid diet, advance as tolerated  -Surgery consulted in the ED, recs appreciated: no surgical intervention at this time  -GI consulted in the ED, recs appreciated: monitor stools, replace electrolytes prn, advance diet as tolerated  -c/w zofran prn  -d/c ezetimibe in the setting of diarrhea  -gentle fluids prn  -encourage po intake and hydration    #DOUGLAS on CKD, stable  -baseline Cr ~1.6  -Cr this AM 1.47  -c/w gentle fluids  -monitor BMP    #Hypophosphatemia  -Phos 2.1> 1.6   -replete prn    #CAD s/p CABG  #HLD  #History of CHF, not in exacerbation currently  -c/w ASA 81mg  -c/w atorvastatin 80mg   -c/w metoprolol 50mg  -d/c zetia in the setting of diarrhea    #History of emphysema  -c/w albuterol prn    #Chronic neuropathy  -c/w gabapentin 600mg     #HTN  -c/w lisinopril 40mg    #Type II Diabetes  -HbA1c 3/23: 6.8%  -hold home meds  -c/w ISS, accuchecks  -maintain blood glucose 100-180 while hospitalized  -f/u A1c    #BPH  -c/w finasteride 5mg    #GERD  #GI ppx  -c/w protonix IV 40mg  -can consider switch to po when patient tolerates po intake    #Diet  -full liquid advanced to regular diet today  -CC, DASH/TLC    #DVT ppx  -SCDs  -encourage ambulation    #GOC  -full code       *Case seen and discussed with Dr. Correia.

## 2025-01-19 NOTE — DISCHARGE NOTE NURSING/CASE MANAGEMENT/SOCIAL WORK - NSDCPEFALRISK_GEN_ALL_CORE
For information on Fall & Injury Prevention, visit: [email or web address] OR  [email or web address] OR  https://www.cdc.gov/steadi/patient.html

## 2025-01-19 NOTE — PROGRESS NOTE ADULT - PROBLEM SELECTOR PLAN 1
- Continue to hydrate well  -Monitor stools  -Replace electrolytes as needed  - Patient now tolerating diet well

## 2025-01-19 NOTE — DISCHARGE NOTE NURSING/CASE MANAGEMENT/SOCIAL WORK - FINANCIAL ASSISTANCE
[facility] provides services at a reduced cost to those who are determined to be eligible through [facility]’s financial assistance program. Information regarding [facility]’s financial assistance program can be found by going to [email or web address] or by calling [phone].

## 2025-01-19 NOTE — PROGRESS NOTE ADULT - SUBJECTIVE AND OBJECTIVE BOX
INTERVAL HPI/OVERNIGHT EVENTS: No acute events overnight. Patient reports he is feeling well. No more episodes of diarrhea.    MEDICATIONS  (STANDING):  aspirin enteric coated 81 milliGRAM(s) Oral daily  atorvastatin 80 milliGRAM(s) Oral at bedtime  dextrose 5%. 1000 milliLiter(s) (50 mL/Hr) IV Continuous <Continuous>  dextrose 5%. 1000 milliLiter(s) (100 mL/Hr) IV Continuous <Continuous>  dextrose 50% Injectable 25 Gram(s) IV Push once  dextrose 50% Injectable 12.5 Gram(s) IV Push once  dextrose 50% Injectable 25 Gram(s) IV Push once  finasteride 5 milliGRAM(s) Oral daily  gabapentin 600 milliGRAM(s) Oral daily  glucagon  Injectable 1 milliGRAM(s) IntraMuscular once  insulin lispro (ADMELOG) corrective regimen sliding scale   SubCutaneous three times a day before meals  insulin lispro (ADMELOG) corrective regimen sliding scale   SubCutaneous at bedtime  lisinopril 40 milliGRAM(s) Oral daily  metoprolol succinate ER 50 milliGRAM(s) Oral daily  pantoprazole  Injectable 40 milliGRAM(s) IV Push daily  sodium chloride 0.45%. 1000 milliLiter(s) (50 mL/Hr) IV Continuous <Continuous>    MEDICATIONS  (PRN):  acetaminophen     Tablet .. 650 milliGRAM(s) Oral every 6 hours PRN Temp greater or equal to 38C (100.4F), Mild Pain (1 - 3)  albuterol    90 MICROgram(s) HFA Inhaler 2 Puff(s) Inhalation every 6 hours PRN Bronchospasm  aluminum hydroxide/magnesium hydroxide/simethicone Suspension 30 milliLiter(s) Oral every 4 hours PRN Dyspepsia  dextrose Oral Gel 15 Gram(s) Oral once PRN Blood Glucose LESS THAN 70 milliGRAM(s)/deciliter  melatonin 3 milliGRAM(s) Oral at bedtime PRN Insomnia  ondansetron Injectable 4 milliGRAM(s) IV Push every 8 hours PRN Nausea and/or Vomiting      Allergies    No Known Allergies    Intolerances        Review of Systems:    General:  No wt loss, fevers, chills  ENT:  No sore throat, pain, runny nose, dysphagia  CV:  No pain, palpitatioins, hypo/hypertension  Resp:  No dyspnea, cough, tachypnea, wheezing  GI: No abdominal pain , N/V , hematemesis or hematochezia  Neuro:  No weakness, tingling, memory problems  Heme:  No petechiae, ecchymosis, easy bruisability        Vital Signs Last 24 Hrs  T(C): 36.6 (19 Jan 2025 05:48), Max: 36.7 (18 Jan 2025 12:06)  T(F): 97.9 (19 Jan 2025 05:48), Max: 98.1 (18 Jan 2025 12:06)  HR: 63 (19 Jan 2025 05:48) (57 - 63)  BP: 183/85 (19 Jan 2025 05:48) (146/60 - 183/85)  BP(mean): 118 (19 Jan 2025 05:48) (118 - 118)  RR: 17 (19 Jan 2025 05:48) (16 - 18)  SpO2: 97% (19 Jan 2025 05:48) (93% - 97%)    Parameters below as of 19 Jan 2025 05:48  Patient On (Oxygen Delivery Method): room air        PHYSICAL EXAM:    Constitutional: NAD, well-developed  Respiratory: clear to auscultation b/l no rales, rhonchi, wheezing  Cardiovascular: S1 and S2, RRR, no murmur  Gastrointestinal: +BS x4, soft, NT/ND  Extremities: No peripheral edema  Neurological: A/O x 3, no focal deficits  Psychiatric: Normal mood, normal affect        LABS:                        15.0   5.07  )-----------( 134      ( 19 Jan 2025 07:23 )             43.7     01-19    139  |  106  |  23  ----------------------------<  141[H]  4.0   |  25  |  1.47[H]    Ca    9.5      19 Jan 2025 07:23  Phos  1.6     01-19  Mg     2.2     01-19    TPro  6.4  /  Alb  2.8[L]  /  TBili  0.5  /  DBili  x   /  AST  29  /  ALT  36  /  AlkPhos  89  01-19      Urinalysis Basic - ( 19 Jan 2025 07:23 )    Color: x / Appearance: x / SG: x / pH: x  Gluc: 141 mg/dL / Ketone: x  / Bili: x / Urobili: x   Blood: x / Protein: x / Nitrite: x   Leuk Esterase: x / RBC: x / WBC x   Sq Epi: x / Non Sq Epi: x / Bacteria: x      LIVER FUNCTIONS - ( 19 Jan 2025 07:23 )  Alb: 2.8 g/dL / Pro: 6.4 g/dL / ALK PHOS: 89 U/L / ALT: 36 U/L / AST: 29 U/L / GGT: x             RADIOLOGY & ADDITIONAL TESTS:  ACC: [id number] EXAM:  CT ABDOMEN AND PELVIS   ORDERED BY: ANTHONY FERNANDES     PROCEDURE DATE:  01/17/2025          INTERPRETATION:  CLINICAL INFORMATION: Abdominal pain and tenderness;   rule out colitis.    COMPARISON: Relevant images from CT chestMarch 10, 2023    CONTRAST/COMPLICATIONS:  IV Contrast: NONE  Oral Contrast: NONE  .    PROCEDURE:  CT of the Abdomen and Pelvis was performed.  Sagittal and coronal reformats were performed.    FINDINGS:  LOWER CHEST: 7 mm right lower lobe nodule, partially imaged, however   appears unchanged compared with March 10, 2023.    LIVER: Within normal limits.  BILE DUCTS: Normal caliber.  GALLBLADDER: Cholecystectomy.  SPLEEN: Within normal limits.  PANCREAS: Within normal limits.  ADRENALS: Stable bilateral adrenal thickening.  KIDNEYS/URETERS: Within normal limits.    BLADDER: Within normal limits.  REPRODUCTIVE ORGANS: Prostate is enlarged.    BOWEL: The stomach is mildly distended with fluid and there are   fluid-filled small bowel loops. No bowel obstruction or wall thickening.   Appendix is not visualized.  PERITONEUM/RETROPERITONEUM: Within normal limits.  VESSELS: Atherosclerotic changes.  LYMPH NODES: No lymphadenopathy.  ABDOMINAL WALL: Within normal limits.  BONES: Mild bilateral hip degenerative changes.    IMPRESSION:  Fluid-filled stomach and small bowel loops without evidence of   obstruction or gastroenteritis.    --- End of Report ---            ONEYDA NOEL MD; Attending Radiologist  This document has been electronically signed. Jan 17 2025  2:31PM      
Patient is a 80y old  Male who presents with a chief complaint of abdominal pain.       Overnight Events: None  Interval HPI: Patient seen and examined at bedside. Abdominal pain had resolved. Looking to go home        Vital Signs Last 24 Hrs  T(C): 36.6 (19 Jan 2025 05:48), Max: 36.7 (18 Jan 2025 12:06)  T(F): 97.9 (19 Jan 2025 05:48), Max: 98.1 (18 Jan 2025 12:06)  HR: 63 (19 Jan 2025 05:48) (57 - 63)  BP: 183/85 (19 Jan 2025 05:48) (146/60 - 183/85)  BP(mean): 118 (19 Jan 2025 05:48) (118 - 118)  RR: 17 (19 Jan 2025 05:48) (16 - 18)  SpO2: 97% (19 Jan 2025 05:48) (93% - 97%)    Parameters below as of 19 Jan 2025 05:48  Patient On (Oxygen Delivery Method): room air        PHYSICAL EXAM:  GENERAL: NAD, lying in bed comfortably  HEENT:  AT/NC, moist mucous membranes, EOMI, conjunctiva and sclera clear  RESPIR:  CTA b/l, no rales, wheezes, or rhonchi,  normal respiratory effort, no intercostal retractions  HEART:  RRR, S1, S2, no murmurs; no pitting edema  ABDOMEN:  +mild generalized abdominal tenderness, BS+, soft, nondistended;   MSK/EXTREMITIES: 2+ peripheral pulses, no clubbing or cyanosis  NERVOUS SYSTEM: answers questions and follows commands appropriately, A&Ox3 grossly moves all extremities   SKIN: warm, well perfused, approprate for age, no visible lesions   PSYCH: Appropriate affect, Alert & Awake; Good judgement    LABS:   All Labs Personally Reviewed                         15.0   5.07  )-----------( 134      ( 19 Jan 2025 07:23 )             43.7     01-19    139  |  106  |  23  ----------------------------<  141[H]  4.0   |  25  |  1.47[H]    Ca    9.5      19 Jan 2025 07:23  Phos  1.6     01-19  Mg     2.2     01-19    TPro  6.4  /  Alb  2.8[L]  /  TBili  0.5  /  DBili  x   /  AST  29  /  ALT  36  /  AlkPhos  89  01-19          Blood Culture: 01-17 @ 17:30  Organism --  Gram Stain Blood -- Gram Stain --  Specimen Source .Blood BLOOD  Culture-Blood --    01-17 @ 17:25  Organism --  Gram Stain Blood -- Gram Stain --  Specimen Source .Blood BLOOD  Culture-Blood --      I&O's Summary    CAPILLARY BLOOD GLUCOSE      POCT Blood Glucose.: 136 mg/dL (19 Jan 2025 08:27)  POCT Blood Glucose.: 134 mg/dL (18 Jan 2025 20:55)  POCT Blood Glucose.: 145 mg/dL (18 Jan 2025 17:10)      RADIOLOGY/EKG:  All Imaging and EKGs Personally Reviewed     MEDICATIONS:  MEDICATIONS  (STANDING):  aspirin enteric coated 81 milliGRAM(s) Oral daily  atorvastatin 80 milliGRAM(s) Oral at bedtime  dextrose 5%. 1000 milliLiter(s) (50 mL/Hr) IV Continuous <Continuous>  dextrose 5%. 1000 milliLiter(s) (100 mL/Hr) IV Continuous <Continuous>  dextrose 50% Injectable 25 Gram(s) IV Push once  dextrose 50% Injectable 12.5 Gram(s) IV Push once  dextrose 50% Injectable 25 Gram(s) IV Push once  finasteride 5 milliGRAM(s) Oral daily  gabapentin 600 milliGRAM(s) Oral daily  glucagon  Injectable 1 milliGRAM(s) IntraMuscular once  insulin lispro (ADMELOG) corrective regimen sliding scale   SubCutaneous three times a day before meals  insulin lispro (ADMELOG) corrective regimen sliding scale   SubCutaneous at bedtime  lisinopril 40 milliGRAM(s) Oral daily  metoprolol succinate ER 50 milliGRAM(s) Oral daily  pantoprazole  Injectable 40 milliGRAM(s) IV Push daily  sodium chloride 0.45%. 1000 milliLiter(s) (50 mL/Hr) IV Continuous <Continuous>        
Patient is a 80y old  Male who presents with a chief complaint of abdominal pain.     OVERNIGHT EVENTS: No overnight events.  SUBJECTIVE: Patient seen and examined at bedside. States that he feels fine, had two episodes of loose stool this morning. Still having abdominal pain. No other complaints.     REVIEW OF SYSTEMS:  CONSTITUTIONAL: No fever or chills  HEENT:  No headache, no sore throat  RESPIRATORY: No cough, wheezing, or shortness of breath  CARDIOVASCULAR: No chest pain, palpitations  GASTROINTESTINAL: No abd pain, nausea, vomiting, or diarrhea  GENITOURINARY: No dysuria, frequency, or hematuria  NEUROLOGICAL: no focal weakness or dizziness  MUSCULOSKELETAL: no myalgias     Vital Signs Last 24 Hrs  T(C): 36.7 (18 Jan 2025 12:06), Max: 37.5 (18 Jan 2025 05:20)  T(F): 98.1 (18 Jan 2025 12:06), Max: 99.5 (18 Jan 2025 05:20)  HR: 62 (18 Jan 2025 12:06) (62 - 97)  BP: 149/69 (18 Jan 2025 12:06) (147/57 - 171/61)  RR: 18 (18 Jan 2025 12:06) (16 - 18)  SpO2: 93% (18 Jan 2025 12:06) (93% - 99%)    Parameters below as of 18 Jan 2025 12:06  Patient On (Oxygen Delivery Method): room air      I&O's Summary    17 Jan 2025 07:01  -  18 Jan 2025 07:00  --------------------------------------------------------  IN: 0 mL / OUT: 600 mL / NET: -600 mL      BMI (kg/m2): 27.9 (01-17-25 @ 12:40)    PHYSICAL EXAM:  GENERAL: NAD, lying in bed comfortably  HEENT:  AT/NC, moist mucous membranes, EOMI, conjunctiva and sclera clear  RESPIR:  CTA b/l, no rales, wheezes, or rhonchi,  normal respiratory effort, no intercostal retractions  HEART:  RRR, S1, S2, no murmurs; no pitting edema  ABDOMEN:  +mild generalized abdominal tenderness, BS+, soft, nondistended; No HSM  MSK/EXTREMITIES: 2+ peripheral pulses, no clubbing or cyanosis  NERVOUS SYSTEM: answers questions and follows commands appropriately, A&Ox3 grossly moves all extremities   SKIN: warm, well perfused, approprate for age, no visible lesions   PSYCH: Appropriate affect, Alert & Awake; Good judgement    MEDICATIONS  (STANDING):  aspirin enteric coated 81 milliGRAM(s) Oral daily  atorvastatin 80 milliGRAM(s) Oral at bedtime  finasteride 5 milliGRAM(s) Oral daily  gabapentin 600 milliGRAM(s) Oral daily  lisinopril 40 milliGRAM(s) Oral daily  metoprolol succinate ER 50 milliGRAM(s) Oral daily  pantoprazole  Injectable 40 milliGRAM(s) IV Push daily  sodium chloride 0.45%. 1000 milliLiter(s) (50 mL/Hr) IV Continuous <Continuous>    MEDICATIONS  (PRN):  acetaminophen     Tablet .. 650 milliGRAM(s) Oral every 6 hours PRN Temp greater or equal to 38C (100.4F), Mild Pain (1 - 3)  albuterol    90 MICROgram(s) HFA Inhaler 2 Puff(s) Inhalation every 6 hours PRN Bronchospasm  aluminum hydroxide/magnesium hydroxide/simethicone Suspension 30 milliLiter(s) Oral every 4 hours PRN Dyspepsia  melatonin 3 milliGRAM(s) Oral at bedtime PRN Insomnia  metoclopramide Injectable 5 milliGRAM(s) IV Push every 6 hours PRN nausea  ondansetron Injectable 4 milliGRAM(s) IV Push every 8 hours PRN Nausea and/or Vomiting      Allergies    No Known Allergies    Intolerances      LABS: Personally reviewed  CBC                        15.0   6.78  )-----------( 141      ( 18 Jan 2025 06:52 )             45.1     CMP  01-18    138  |  105  |  26  ----------------------------<  172  4.1   |  22  |  1.57    Ca    9.7      18 Jan 2025 06:52  Phos  2.1     01-18    TPro  7.0  /  Alb  3.1  /  TBili  0.8  /  DBili  x   /  AST  25  /  ALT  39  /  AlkPhos  74  01-18      Lipase: 22 U/L (01-17-25 @ 13:00    Urinalysis Basic - ( 18 Jan 2025 06:52 )    Color: x / Appearance: x / SG: x / pH: x  Gluc: 172 mg/dL / Ketone: x  / Bili: x / Urobili: x   Blood: x / Protein: x / Nitrite: x   Leuk Esterase: x / RBC: x / WBC x   Sq Epi: x / Non Sq Epi: x / Bacteria: x      RADIOLOGY & ADDITIONAL TESTS: Personally reviewed.     CT Abdomen and Pelvis No Cont 1/17/2025  IMPRESSION:  Fluid-filled stomach and small bowel loops without evidence of   obstruction or gastroenteritis.  -  -  Chest X-ray 1/17/2025  IMPRESSION: No acute change.     Consultant(s) Notes Reviewed:  [x] YES  [ ] NO   Discussed with AXEL/DENI RN

## 2025-01-19 NOTE — PROGRESS NOTE ADULT - ATTENDING COMMENTS
admitted with diarrhea sec to noravirus    - resolved  - electrolytes replaced    - discharge planning

## 2025-01-19 NOTE — PROGRESS NOTE ADULT - ASSESSMENT
79 yo man with history of CAD s/p CABG, Diabetes Mellitus II, Hypertension, Hyperlipidemia, and history CHF comes to ED nausea and diarrhea x 24 hrs, no blood in the stool. with generalized abdominal discomfort, no fever, no sob, no cp, no dysuria. ed course-  Normal white count.  Bands 10%? creat 1.6 appears baseline.     GI was consulted for - CT demonstrates fluid-filled stomach and small bowel loops without signs of obstruction.  Positive for Norovirus

## 2025-01-19 NOTE — DISCHARGE NOTE NURSING/CASE MANAGEMENT/SOCIAL WORK - PATIENT PORTAL LINK FT
You can access the FollowMyHealth Patient Portal offered by [facility] by registering at the following website: [email or web address]. By joining [facility]’s FollowMyHealth portal, you will also be able to view your health information using other applications (apps) compatible with our system.

## 2025-01-19 NOTE — PROGRESS NOTE ADULT - PROBLEM SELECTOR PLAN 2
Now resolved  CT revealed fluid-filled stomach and small bowel loops without signs of obstruction.  Patient to schedule further work up ( Endoscopy and colonoscopy ) with his GI doctor at [facility].

## 2025-01-23 LAB
CULTURE RESULTS: SIGNIFICANT CHANGE UP
CULTURE RESULTS: SIGNIFICANT CHANGE UP
SPECIMEN SOURCE: SIGNIFICANT CHANGE UP
SPECIMEN SOURCE: SIGNIFICANT CHANGE UP